# Patient Record
Sex: FEMALE | Race: BLACK OR AFRICAN AMERICAN | NOT HISPANIC OR LATINO | URBAN - METROPOLITAN AREA
[De-identification: names, ages, dates, MRNs, and addresses within clinical notes are randomized per-mention and may not be internally consistent; named-entity substitution may affect disease eponyms.]

---

## 2022-08-04 ENCOUNTER — INPATIENT (INPATIENT)
Facility: HOSPITAL | Age: 52
LOS: 3 days | Discharge: NURSING, REHAB OR LONG-TERM CARE | DRG: 82 | End: 2022-08-08
Attending: STUDENT IN AN ORGANIZED HEALTH CARE EDUCATION/TRAINING PROGRAM | Admitting: STUDENT IN AN ORGANIZED HEALTH CARE EDUCATION/TRAINING PROGRAM
Payer: MEDICAID

## 2022-08-04 VITALS
TEMPERATURE: 99 F | HEIGHT: 64 IN | RESPIRATION RATE: 18 BRPM | OXYGEN SATURATION: 99 % | SYSTOLIC BLOOD PRESSURE: 171 MMHG | DIASTOLIC BLOOD PRESSURE: 108 MMHG | HEART RATE: 107 BPM | WEIGHT: 160.06 LBS

## 2022-08-04 LAB
ALBUMIN SERPL ELPH-MCNC: 2.7 G/DL — LOW (ref 3.3–5)
ALP SERPL-CCNC: 88 U/L — SIGNIFICANT CHANGE UP (ref 40–120)
ALT FLD-CCNC: 19 U/L — SIGNIFICANT CHANGE UP (ref 10–45)
ANION GAP SERPL CALC-SCNC: 12 MMOL/L — SIGNIFICANT CHANGE UP (ref 5–17)
APPEARANCE UR: ABNORMAL
APTT BLD: 29.2 SEC — SIGNIFICANT CHANGE UP (ref 27.5–35.5)
AST SERPL-CCNC: 20 U/L — SIGNIFICANT CHANGE UP (ref 10–40)
BACTERIA # UR AUTO: ABNORMAL
BASOPHILS # BLD AUTO: 0.02 K/UL — SIGNIFICANT CHANGE UP (ref 0–0.2)
BASOPHILS NFR BLD AUTO: 0.2 % — SIGNIFICANT CHANGE UP (ref 0–2)
BILIRUB SERPL-MCNC: 0.2 MG/DL — SIGNIFICANT CHANGE UP (ref 0.2–1.2)
BILIRUB UR-MCNC: NEGATIVE — SIGNIFICANT CHANGE UP
BLD GP AB SCN SERPL QL: NEGATIVE — SIGNIFICANT CHANGE UP
BUN SERPL-MCNC: 5 MG/DL — LOW (ref 7–23)
CALCIUM SERPL-MCNC: 8.3 MG/DL — LOW (ref 8.4–10.5)
CHLORIDE SERPL-SCNC: 104 MMOL/L — SIGNIFICANT CHANGE UP (ref 96–108)
CO2 SERPL-SCNC: 22 MMOL/L — SIGNIFICANT CHANGE UP (ref 22–31)
COLOR SPEC: SIGNIFICANT CHANGE UP
COMMENT - URINE: SIGNIFICANT CHANGE UP
CREAT SERPL-MCNC: 0.34 MG/DL — LOW (ref 0.5–1.3)
DIFF PNL FLD: NEGATIVE — SIGNIFICANT CHANGE UP
EGFR: 123 ML/MIN/1.73M2 — SIGNIFICANT CHANGE UP
EOSINOPHIL # BLD AUTO: 0.03 K/UL — SIGNIFICANT CHANGE UP (ref 0–0.5)
EOSINOPHIL NFR BLD AUTO: 0.3 % — SIGNIFICANT CHANGE UP (ref 0–6)
EPI CELLS # UR: 3 /HPF — SIGNIFICANT CHANGE UP
FLUAV AG NPH QL: SIGNIFICANT CHANGE UP
FLUBV AG NPH QL: SIGNIFICANT CHANGE UP
GLUCOSE SERPL-MCNC: 104 MG/DL — HIGH (ref 70–99)
GLUCOSE UR QL: NEGATIVE — SIGNIFICANT CHANGE UP
HCT VFR BLD CALC: 30 % — LOW (ref 34.5–45)
HGB BLD-MCNC: 9.7 G/DL — LOW (ref 11.5–15.5)
HYALINE CASTS # UR AUTO: 2 /LPF — SIGNIFICANT CHANGE UP (ref 0–7)
IMM GRANULOCYTES NFR BLD AUTO: 0.4 % — SIGNIFICANT CHANGE UP (ref 0–1.5)
INR BLD: 1.27 RATIO — HIGH (ref 0.88–1.16)
KETONES UR-MCNC: NEGATIVE — SIGNIFICANT CHANGE UP
LEUKOCYTE ESTERASE UR-ACNC: ABNORMAL
LYMPHOCYTES # BLD AUTO: 0.82 K/UL — LOW (ref 1–3.3)
LYMPHOCYTES # BLD AUTO: 7.1 % — LOW (ref 13–44)
MCHC RBC-ENTMCNC: 30.7 PG — SIGNIFICANT CHANGE UP (ref 27–34)
MCHC RBC-ENTMCNC: 32.3 GM/DL — SIGNIFICANT CHANGE UP (ref 32–36)
MCV RBC AUTO: 94.9 FL — SIGNIFICANT CHANGE UP (ref 80–100)
MONOCYTES # BLD AUTO: 0.64 K/UL — SIGNIFICANT CHANGE UP (ref 0–0.9)
MONOCYTES NFR BLD AUTO: 5.6 % — SIGNIFICANT CHANGE UP (ref 2–14)
NEUTROPHILS # BLD AUTO: 9.95 K/UL — HIGH (ref 1.8–7.4)
NEUTROPHILS NFR BLD AUTO: 86.4 % — HIGH (ref 43–77)
NITRITE UR-MCNC: NEGATIVE — SIGNIFICANT CHANGE UP
NRBC # BLD: 0 /100 WBCS — SIGNIFICANT CHANGE UP (ref 0–0)
PA ADP PRP-ACNC: 232 PRU — SIGNIFICANT CHANGE UP (ref 194–417)
PH UR: 6 — SIGNIFICANT CHANGE UP (ref 5–8)
PLATELET # BLD AUTO: 681 K/UL — HIGH (ref 150–400)
PLATELET RESPONSE ASPIRIN RESULT: 669 ARU — SIGNIFICANT CHANGE UP (ref 350–700)
POTASSIUM SERPL-MCNC: 3.6 MMOL/L — SIGNIFICANT CHANGE UP (ref 3.5–5.3)
POTASSIUM SERPL-SCNC: 3.6 MMOL/L — SIGNIFICANT CHANGE UP (ref 3.5–5.3)
PROT SERPL-MCNC: 6.1 G/DL — SIGNIFICANT CHANGE UP (ref 6–8.3)
PROT UR-MCNC: ABNORMAL
PROTHROM AB SERPL-ACNC: 14.6 SEC — HIGH (ref 10.5–13.4)
RBC # BLD: 3.16 M/UL — LOW (ref 3.8–5.2)
RBC # FLD: 14.9 % — HIGH (ref 10.3–14.5)
RBC CASTS # UR COMP ASSIST: 3 /HPF — SIGNIFICANT CHANGE UP (ref 0–4)
RH IG SCN BLD-IMP: POSITIVE — SIGNIFICANT CHANGE UP
RSV RNA NPH QL NAA+NON-PROBE: SIGNIFICANT CHANGE UP
SARS-COV-2 RNA SPEC QL NAA+PROBE: SIGNIFICANT CHANGE UP
SODIUM SERPL-SCNC: 138 MMOL/L — SIGNIFICANT CHANGE UP (ref 135–145)
SP GR SPEC: 1.02 — SIGNIFICANT CHANGE UP (ref 1.01–1.02)
UROBILINOGEN FLD QL: NEGATIVE — SIGNIFICANT CHANGE UP
WBC # BLD: 11.51 K/UL — HIGH (ref 3.8–10.5)
WBC # FLD AUTO: 11.51 K/UL — HIGH (ref 3.8–10.5)
WBC UR QL: 6 /HPF — HIGH (ref 0–5)

## 2022-08-04 PROCEDURE — 74177 CT ABD & PELVIS W/CONTRAST: CPT | Mod: 26,MA

## 2022-08-04 PROCEDURE — 70450 CT HEAD/BRAIN W/O DYE: CPT | Mod: 26,MA

## 2022-08-04 PROCEDURE — 99285 EMERGENCY DEPT VISIT HI MDM: CPT

## 2022-08-04 PROCEDURE — 71045 X-RAY EXAM CHEST 1 VIEW: CPT | Mod: 26

## 2022-08-04 PROCEDURE — 99232 SBSQ HOSP IP/OBS MODERATE 35: CPT | Mod: GC

## 2022-08-04 RX ORDER — LEVETIRACETAM 250 MG/1
500 TABLET, FILM COATED ORAL EVERY 12 HOURS
Refills: 0 | Status: DISCONTINUED | OUTPATIENT
Start: 2022-08-04 | End: 2022-08-08

## 2022-08-04 RX ORDER — PIPERACILLIN AND TAZOBACTAM 4; .5 G/20ML; G/20ML
3.38 INJECTION, POWDER, LYOPHILIZED, FOR SOLUTION INTRAVENOUS ONCE
Refills: 0 | Status: COMPLETED | OUTPATIENT
Start: 2022-08-04 | End: 2022-08-04

## 2022-08-04 RX ORDER — SODIUM CHLORIDE 9 MG/ML
500 INJECTION INTRAMUSCULAR; INTRAVENOUS; SUBCUTANEOUS ONCE
Refills: 0 | Status: COMPLETED | OUTPATIENT
Start: 2022-08-04 | End: 2022-08-04

## 2022-08-04 RX ADMIN — LEVETIRACETAM 400 MILLIGRAM(S): 250 TABLET, FILM COATED ORAL at 22:28

## 2022-08-04 RX ADMIN — PIPERACILLIN AND TAZOBACTAM 200 GRAM(S): 4; .5 INJECTION, POWDER, LYOPHILIZED, FOR SOLUTION INTRAVENOUS at 20:53

## 2022-08-04 RX ADMIN — Medication 100 MILLIGRAM(S): at 19:51

## 2022-08-04 RX ADMIN — SODIUM CHLORIDE 500 MILLILITER(S): 9 INJECTION INTRAMUSCULAR; INTRAVENOUS; SUBCUTANEOUS at 19:52

## 2022-08-04 NOTE — ED PROVIDER NOTE - ATTENDING CONTRIBUTION TO CARE
Attending MD Nathalia Kline:  I personally have seen and examined this patient.  Resident note reviewed and agree on plan of care and except where noted.  See HPI, PE, and MDM for details.

## 2022-08-04 NOTE — CONSULT NOTE ADULT - ASSESSMENT
52F pmh Dolores's disease, dementia, HTN, recurrent UTIs with indwelling Lugo who presents after fall.  Transferred from [facility] after CT there showed left head frontal contusion with concern for traumatic SDH.  General Surgery consulted for r/o necrotizing fasciitis in the left buttock area where pt has a stage IV decubitus ulcer. Febrile to 100.8 upon transfer.     Plan:  - stool disimpaction on rectal exam at bedside   - no surgical intervention required   - dispo per ED/NSGY      Seen and Discussed with Attending, Dr. Kulkarni      [facility]  [id number] 52F pmh Dolores's disease, dementia, HTN, recurrent UTIs with indwelling Lugo who presents after fall.  Transferred from [facility] after CT there showed left head frontal contusion with concern for traumatic SDH.  General Surgery consulted for r/o necrotizing fasciitis in the left buttock area where pt has a stage IV decubitus ulcer. No concern for nec fasc on physical exam with no evidence of crepitus. L sided decubitus ulcer open which can be source of air on imaging. LRINEC score of 6.     Plan:  - stool disimpaction on rectal exam at bedside   - no surgical intervention required, no evidence of nec fasc   - dispo per ED/NSGY      Seen and Discussed with Attending, Dr. Kulkarni      [facility]  [id number] 52F pmh Dolores's disease, dementia, HTN, recurrent UTIs with indwelling Lugo who presents after fall.  Transferred from [facility] after CT there showed left head frontal contusion with concern for traumatic SDH. General Surgery consulted for r/o necrotizing fasciitis in the left buttock area where pt has a stage IV decubitus ulcer. No concern for nec fasc on physical exam with no evidence of crepitus. L sided decubitus ulcer open which can be source of air on imaging. LRINEC score of 6.   Plan: - stool disimpaction on rectal exam at bedside  - no surgical intervention required, no evidence of nec fasc  - dispo per ED/NSGY   Seen and Discussed with Attending, Dr. Kulkarni    ACS [id number]

## 2022-08-04 NOTE — ED PROVIDER NOTE - PHYSICAL EXAMINATION
Vital signs reviewed.  CONSTITUTIONAL: Well appearing in NAD  HEAD: Normocephalic; left frontal contusion non-bleeding  NECK: Trachea midline  CV: Normal S1, S2; extremities WWP  RESP: normal work of breathing; no stridor  ABD: soft, non-distended; non-tender  MSK/EXT: no edema, no deformities  SKIN: Warm and dry as visualized.  stage IV sacral decubitus ulcer granulation tissue nonpurulent drainage  NEURO: A&O, appears non-focal  Psych: Appropriate mood and affect Vital signs reviewed.  CONSTITUTIONAL: Well appearing in NAD  HEAD: Normocephalic; left frontal contusion non-bleeding  NECK: Trachea midline  CV: Normal S1, S2; extremities WWP  RESP: normal work of breathing; no stridor  ABD: soft, non-distended; non-tender  MSK/EXT: no edema, no deformities  SKIN: Warm and dry as visualized.  stage IV sacral decubitus ulcer granulation tissue nonpurulent drainage  NEURO: A&O, appears non-focal  Psych: Appropriate mood and affect  Attending Nathalia Kline: Gen: frail appearing, heent: small hematoma to left forehead, perrla, dry mucous membranes, chest; nttp, cv: rrr, lungs; ctab, abd: soft nontender, nondistended. ext: wwp, no deformity, skin: large sacral decubitus, with mild surrounding erythema, neuro: awake, not following commands

## 2022-08-04 NOTE — PROGRESS NOTE ADULT - ATTENDING COMMENTS
PT seen and examined.  Agree with above resident evaluation and assessment.  PT alert, responds with vocalizations, COKER.  CT showed possible subacute/chronic right SDH, small.  Repeat read as no blood or collections. No neurosurgery needed at this time. Care per primary team.

## 2022-08-04 NOTE — ED PROVIDER NOTE - PROGRESS NOTE DETAILS
imformed by radiology resident dr daniel of gas tracking from sacral decub as well as very impressive functional bowel obstruction going to small bowel.  Surgery paged for consult, clindamycin ordered and discussed with RN.

## 2022-08-04 NOTE — ED ADULT NURSE NOTE - NSICDXPASTMEDICALHX_GEN_ALL_CORE_FT
PAST MEDICAL HISTORY:  Dementia     History of Elliott's disease     HLD (hyperlipidemia)     HTN (hypertension)

## 2022-08-04 NOTE — ED ADULT NURSE NOTE - OBJECTIVE STATEMENT
52 year old female presents to ED via ems c/o subdural head bleed secondary to fall out of bed, unwitnessed.  Patient is nonverbal, arrived with PICC line to R underarm with indwelling sanchez catheter in place. Stage 3 sacral healing wound, cleaned and protective allevyn placed to wound.  Indwellilng sanchez catheter changed.  Lung sounds clear. Abd nondistended nontender. Patient is thin.

## 2022-08-04 NOTE — ED ADULT TRIAGE NOTE - CHIEF COMPLAINT QUOTE
transferred from [facility] s/po un witnessed fall at rehab and SDH found on scan. As per EMS pt is at baseline mental status. transferred from [facility] s/p un witnessed fall at rehab and SDH found on scan. As per EMS pt is at baseline mental status.

## 2022-08-04 NOTE — ED ADULT NURSE NOTE - CHIEF COMPLAINT QUOTE
transferred from [facility] s/p un witnessed fall at rehab and SDH found on scan. As per EMS pt is at baseline mental status.

## 2022-08-04 NOTE — ED PROVIDER NOTE - CLINICAL SUMMARY MEDICAL DECISION MAKING FREE TEXT BOX
52F arslan, dementia, htn, hld who presents after fall with SDH. Due for 4h repeat head CT at 740pm.  Neurosx aware. 52F arslan, dementia, htn, hld who presents after fall with SDH. Due for 4h repeat head CT at 740pm.  Neurosx aware.  Attending Nathalia Kline: 53 yo female with h/o huntingitons dementia on abx for unclear infection transferred from [facility] for concern for subdural hematoma. on review of ED course at [facility] pt febrile there, given zosyn and vanco, ct with evidence of subdural and transferred. upon arrival initially hypertensive on re-cjecl wml;. cultures. sent. on exam pt with sacral decubutis ulcer, fecal matter in diaper. suspect sacral decub could be source of infection. sanchez catheter changed. neurosurgery consulted. pt not reportedly on blood thinners. will obtain ct of wound to further evaluate, pan culture, neurosurgery eval. per report pt at baseline mental status

## 2022-08-04 NOTE — CONSULT NOTE ADULT - SUBJECTIVE AND OBJECTIVE BOX
GENERAL SURGERY CONSULT NOTE  Attending: Dr. Aakash Kulkarni  pager [phone]  --------------------------------------------------------------------------------------------    HPI:   52F pmh Kearney's disease, dementia, HTN, recurrent UTIs with indwelling Lugo who presents after fall.  Transferred from [facility] after CT there showed left head frontal contusion with concern for traumatic SDH.  General Surgery consulted for r/o necrotizing fasciitis in the left buttock area where pt has a stage IV decubitus ulcer. Febrile to 100.8 upon transfer.     In the ED, patient is afebrile and hemodynamically stable. Per report, patient lives in a nursing home, nonverbal at baseline, and her mechanism of fall is unknown. No family at bedside and no contact info per ED.     PAST MEDICAL & SURGICAL HISTORY:  History of Kearney&#x27;s disease      Dementia      HTN (hypertension)      HLD (hyperlipidemia)        FAMILY HISTORY:  [] Family history not pertinent as reviewed with the patient and family    SOCIAL HISTORY:    n/a    ALLERGIES: No Known Allergies      HOME MEDICATIONS:   n/a    CURRENT MEDICATIONS  MEDICATIONS (STANDING): levETIRAcetam  IVPB 500 milliGRAM(s) IV Intermittent every 12 hours    MEDICATIONS (PRN):  --------------------------------------------------------------------------------------------    Vitals:   T(C): 36.9 (22 @ 20:45), Max: 37.1 (22 @ 17:25)  HR: 87 (22 @ 20:45) (87 - 107)  BP: 151/66 (22 @ 20:45) (128/78 - 171/108)  RR: 18 (22 @ 20:45) (18 - 18)  SpO2: 100% (22 @ 20:45) (97% - 100%)  CAPILLARY BLOOD GLUCOSE          Height (cm): 162.6 ( @ 17:25)  Weight (kg): 72.6 ( @ 17:25)  BMI (kg/m2): 27.5 ( 17:25)  BSA (m2): 1.78 (:25)    PHYSICAL EXAM:   General: NAD, Lying in bed comfortably, nonverbal   Neuro: A+Ox0  HEENT: NC/AT, EOMI  Resp: Good effort, CTA b/l  Thorax: No chest wall tenderness  GI/Abd: Soft, NT/ND, no rebound/guarding, no masses palpated  Rectum: L sacral decubitus ulcer stage IV w/ tracking laterally. surrounding tissue looks healthy   Vascular: All 4 extremities warm.  : Chronic Lugo     --------------------------------------------------------------------------------------------    LABS  CBC ( @ 18:35)                              9.7<L>                         11.51<H>  )----------------(  681<H>     86.4<H>% Neutrophils, 7.1<L>% Lymphocytes, ANC: 9.95<H>                              30.0<L>    BMP ( @ 19:33)             138     |  104     |  5<L>  		Ca++ --      Ca 8.3<L>             ---------------------------------( 104<H>		Mg --                 3.6     |  22      |  0.34<L>			Ph --        LFTs ( @ 19:33)      TPro 6.1 / Alb 2.7<L> / TBili 0.2 / DBili -- / AST 20 / ALT 19 / AlkPhos 88    Coags ( @ 18:35)  aPTT 29.2 / INR 1.27<H> / PT 14.6<H>        VBG ( @ 17:57)     7.40 / 40 / 108<H> / 25 / 0.0 / 99.5<H>%     Lactate: 0.7    --------------------------------------------------------------------------------------------    MICROBIOLOGY  Urinalysis ( @ 18:35):     Color: Light Yellow / Appearance: Slightly Turbid<!> / S.016 / pH: 6.0 / Gluc: Negative / Ketones: Negative / Bili: Negative / Urobili: Negative / Protein :Trace<!> / Nitrites: Negative / Leuk.Est: Moderate<!> / RBC: 3 / WBC: 6<H> / Sq Epi:  / Non Sq Epi: 3 / Bacteria Few<!>   MANY AMORPHOUS CRYSTALS PRESENT      --------------------------------------------------------------------------------------------    IMAGING  < from: CT Abdomen and Pelvis w/ IV Cont (22 @ 19:14) >  FINDINGS:  LOWER CHEST: Within normal limits.    LIVER: Within normal limits.  BILE DUCTS: Normal caliber.  GALLBLADDER: Nondilated gallbladder. No pericholecystic fluid nor   appreciable gallbladder wall thickening.  SPLEEN: Within normal limits.  PANCREAS: No peripancreatic fat stranding nor pancreatic edema.  ADRENALS: Within normal limits.  KIDNEYS/URETERS: Symmetric enhancement bilaterally. No hydronephrosis.    BLADDER: Indwelling Lugo. Visible air within the bladder; likely   secondary to recent instrumentation. No arnaud appreciable bladder   abnormality.  REPRODUCTIVE ORGANS: Globular fibroid uterus with varying degrees of   calcification.    BOWEL: Profound stool burden with severe rectal distention and rectal   wall thickening and adjacent fat stranding. Extensive fecalization of the   small bowel with dilated large and small bowel loops containing formed   feces.  PERITONEUM: No ascites.  VESSELS: Atherosclerotic changes.  RETROPERITONEUM/LYMPH NODES: No lymphadenopathy.  ABDOMINAL WALL: Visible defect of the sacrum consistent with decubitus   ulcer (series 2-88). Tracking gas along the posterior sacral fascia and   within the left greater than right gluteal musculature; gas forming   bacterial infection cannot be excluded.  BONES: Minimal degenerative changes. No appreciable erosive changes of   the sacrum to suggest sacral osteomyelitis. Suggest further evaluation   with MRI of the sacrum    IMPRESSION:  Sacral decubitus ulcer with locules of gas tracking along the superficial   sacral fascia and into the left greater than right gluteal musculature;   gas forming bacterial infection cannot be excluded.    Suggest further evaluation for suspected sacral osteomyelitis with MRI    Profound stool burden with severe rectal distention and rectal wall   thickening/adjacent fat stranding. Extensive sacralization small bowel   with multiple dilated loops of large and small bowel containing formed   feces. Findings are consistent with partial/functional bowel obstruction   originating from the impacted rectum.    Suspicion for stercoral colitis.    Findings and recommendations regarding subcutaneous gas collection and   functional bowel obstruction. discussed by Dr. Rosa ofradiology with   Dr. Villarreal of primary emergency department team at 7:29 PM on 2022   with read back confirmation.    --- End of Report ---           VALENTINO ROSA MD; Resident Radiologist  This document has been electronically signed.  JENNIFER PEREA MD; Attending Radiologist  This document has been electronically signed. Aug  4 2022  8:23PM    < end of copied text >      --------------------------------------------------------------------------------------------

## 2022-08-04 NOTE — ED PROVIDER NOTE - NSICDXPASTMEDICALHX_GEN_ALL_CORE_FT
PAST MEDICAL HISTORY:  Dementia     History of [place]'s disease     HLD (hyperlipidemia)     HTN (hypertension)

## 2022-08-04 NOTE — ED PROVIDER NOTE - OBJECTIVE STATEMENT
52F pmh Dolores's disease, dementia, HTN, recurrent UTIs with indwelling Sanchez who presents after fall.  Transferred from [facility] after CT there showed left head frontal contusion with concern for traumatic SDH.  Patient also has stage IV sacral decubitus ulcer which was not examined at [facility].  Patient febrile there to 100.8, given zosyn + vancomycin for unclear source of fever.  No cultures obtained there, sanchez not exchanged.    CT head at [facility] obtained at 15:49, 4 hour repeat due at 7:49pm. 52F pmh Dolores's disease, dementia, HTN, recurrent UTIs with indwelling Sanchez who presents after fall.  Transferred from [facility] after CT there showed left head frontal contusion with concern for traumatic SDH.  Patient also has stage IV sacral decubitus ulcer which was not examined at [facility].  Patient febrile there to 100.8, given zosyn + vancomycin for unclear source of fever.  No cultures obtained there, sanchez not exchanged.    CT head at [facility] obtained at 15:49, 4 hour repeat due at 7:49pm.  Abx given at 11:30am.

## 2022-08-04 NOTE — PROGRESS NOTE ADULT - SUBJECTIVE AND OBJECTIVE BOX
62F hx Huntingtons's, dementia, HTN, recurrent UTIs with indwelling sanchez, no AC/AP transfer from [facility] s/p unwitnessed fall. CTH with small R subacute SDH. She has a UTI with low grade sepsis and grade IV sacral ulcer (febrile 100.8).     --Anticoagulation--    T(C): 37.1 (08-04-22 @ 17:25), Max: 37.1 (08-04-22 @ 17:25)  HR: 107 (08-04-22 @ 17:25) (107 - 107)  BP: 171/108 (08-04-22 @ 17:25) (171/108 - 171/108)  RR: 18 (08-04-22 @ 17:25) (18 - 18)  SpO2: 99% (08-04-22 @ 17:25) (99% - 99%)  Wt(kg): --    EXAM:  nonverbal, no EO, PERRL, unable to assess EOM, not FC, contracted RU at wrist, contracted JOE at elbow, contracted L foot, minimal to no movement x4. Baseline exam 2/2 Edmonson’s

## 2022-08-04 NOTE — CONSULT NOTE ADULT - ATTENDING COMMENTS
seen and examined 08-05-22 @ 4315    she fell out of bed in her nursing home and was transferred from [facility] for a small right frontal SDH  we were specifically consulted for her sacral decubitus ulcer, not her trauma.        I reviewed labs and radiologic images. seen and examined 08-04-22 @ 9287    she fell out of bed in her nursing home and was transferred from [facility] for a small right frontal SDH  we were specifically consulted for her sacral decubitus ulcer, not her trauma.        I reviewed labs and radiologic images. seen and examined 08-04-22 @ 2320    she fell out of bed in her nursing home and was transferred from [facility] for a small right frontal SDH  we were specifically consulted for her sacral decubitus ulcer, not her trauma.    afeb  mildly tachycardic and hypertensive on arrival to [facility], but now AVSS  soft / NT / ND  FLAVIA - no masses or blood. hard stool manually disimpacted.    clean sacral decubitus ulcer without odor (see photo above)  irregular contour to sacrum consistent with chronic osteomyelitis  there is a narrow tract that undermines to the left gluteus (consistent with the location of air on CT scan), but no purulent or dishwater drainage on palpating the tract    WBC = 11  ESR = 63  CRP = 19  lactate = 0.7      stage 4 sacral decubitus ulcer  no evidence of acute infection  -wound care    fecal impaction  -aggressive bowel regimen    -reconsult acute care surgery PRN      I reviewed labs and radiologic images.

## 2022-08-05 DIAGNOSIS — N39.0 URINARY TRACT INFECTION, SITE NOT SPECIFIED: ICD-10-CM

## 2022-08-05 DIAGNOSIS — Z86.69 PERSONAL HISTORY OF OTHER DISEASES OF THE NERVOUS SYSTEM AND SENSE ORGANS: ICD-10-CM

## 2022-08-05 DIAGNOSIS — K56.41 FECAL IMPACTION: ICD-10-CM

## 2022-08-05 DIAGNOSIS — S06.5X9A TRAUMATIC SUBDURAL HEMORRHAGE WITH LOSS OF CONSCIOUSNESS OF UNSPECIFIED DURATION, INITIAL ENCOUNTER: ICD-10-CM

## 2022-08-05 DIAGNOSIS — L98.429 NON-PRESSURE CHRONIC ULCER OF BACK WITH UNSPECIFIED SEVERITY: ICD-10-CM

## 2022-08-05 DIAGNOSIS — I10 ESSENTIAL (PRIMARY) HYPERTENSION: ICD-10-CM

## 2022-08-05 LAB
ANION GAP SERPL CALC-SCNC: 9 MMOL/L — SIGNIFICANT CHANGE UP (ref 5–17)
BASOPHILS # BLD AUTO: 0.04 K/UL — SIGNIFICANT CHANGE UP (ref 0–0.2)
BASOPHILS NFR BLD AUTO: 0.6 % — SIGNIFICANT CHANGE UP (ref 0–2)
BUN SERPL-MCNC: <4 MG/DL — LOW (ref 7–23)
CALCIUM SERPL-MCNC: 9.2 MG/DL — SIGNIFICANT CHANGE UP (ref 8.4–10.5)
CHLORIDE SERPL-SCNC: 106 MMOL/L — SIGNIFICANT CHANGE UP (ref 96–108)
CO2 SERPL-SCNC: 27 MMOL/L — SIGNIFICANT CHANGE UP (ref 22–31)
CREAT SERPL-MCNC: 0.48 MG/DL — LOW (ref 0.5–1.3)
CULTURE RESULTS: NO GROWTH — SIGNIFICANT CHANGE UP
EGFR: 113 ML/MIN/1.73M2 — SIGNIFICANT CHANGE UP
EOSINOPHIL # BLD AUTO: 0.06 K/UL — SIGNIFICANT CHANGE UP (ref 0–0.5)
EOSINOPHIL NFR BLD AUTO: 0.9 % — SIGNIFICANT CHANGE UP (ref 0–6)
ERYTHROCYTE [SEDIMENTATION RATE] IN BLOOD: 63 MM/HR — HIGH (ref 0–20)
GLUCOSE SERPL-MCNC: 85 MG/DL — SIGNIFICANT CHANGE UP (ref 70–99)
HCT VFR BLD CALC: 33.2 % — LOW (ref 34.5–45)
HGB BLD-MCNC: 10.6 G/DL — LOW (ref 11.5–15.5)
IMM GRANULOCYTES NFR BLD AUTO: 0.3 % — SIGNIFICANT CHANGE UP (ref 0–1.5)
LYMPHOCYTES # BLD AUTO: 1.05 K/UL — SIGNIFICANT CHANGE UP (ref 1–3.3)
LYMPHOCYTES # BLD AUTO: 15.6 % — SIGNIFICANT CHANGE UP (ref 13–44)
MCHC RBC-ENTMCNC: 31 PG — SIGNIFICANT CHANGE UP (ref 27–34)
MCHC RBC-ENTMCNC: 31.9 GM/DL — LOW (ref 32–36)
MCV RBC AUTO: 97.1 FL — SIGNIFICANT CHANGE UP (ref 80–100)
MONOCYTES # BLD AUTO: 0.51 K/UL — SIGNIFICANT CHANGE UP (ref 0–0.9)
MONOCYTES NFR BLD AUTO: 7.6 % — SIGNIFICANT CHANGE UP (ref 2–14)
NEUTROPHILS # BLD AUTO: 5.04 K/UL — SIGNIFICANT CHANGE UP (ref 1.8–7.4)
NEUTROPHILS NFR BLD AUTO: 75 % — SIGNIFICANT CHANGE UP (ref 43–77)
NRBC # BLD: 0 /100 WBCS — SIGNIFICANT CHANGE UP (ref 0–0)
PLATELET # BLD AUTO: 698 K/UL — HIGH (ref 150–400)
POTASSIUM SERPL-MCNC: 4.4 MMOL/L — SIGNIFICANT CHANGE UP (ref 3.5–5.3)
POTASSIUM SERPL-SCNC: 4.4 MMOL/L — SIGNIFICANT CHANGE UP (ref 3.5–5.3)
RBC # BLD: 3.42 M/UL — LOW (ref 3.8–5.2)
RBC # FLD: 15.4 % — HIGH (ref 10.3–14.5)
SODIUM SERPL-SCNC: 142 MMOL/L — SIGNIFICANT CHANGE UP (ref 135–145)
SPECIMEN SOURCE: SIGNIFICANT CHANGE UP
VANCOMYCIN TROUGH SERPL-MCNC: 10.7 UG/ML — SIGNIFICANT CHANGE UP (ref 10–20)
WBC # BLD: 6.72 K/UL — SIGNIFICANT CHANGE UP (ref 3.8–10.5)
WBC # FLD AUTO: 6.72 K/UL — SIGNIFICANT CHANGE UP (ref 3.8–10.5)

## 2022-08-05 PROCEDURE — 99223 1ST HOSP IP/OBS HIGH 75: CPT

## 2022-08-05 PROCEDURE — 99255 IP/OBS CONSLTJ NEW/EST HI 80: CPT

## 2022-08-05 RX ORDER — HEPARIN SODIUM 5000 [USP'U]/ML
5000 INJECTION INTRAVENOUS; SUBCUTANEOUS EVERY 12 HOURS
Refills: 0 | Status: DISCONTINUED | OUTPATIENT
Start: 2022-08-05 | End: 2022-08-05

## 2022-08-05 RX ORDER — AMLODIPINE BESYLATE 2.5 MG/1
1 TABLET ORAL
Qty: 0 | Refills: 0 | DISCHARGE

## 2022-08-05 RX ORDER — LACTULOSE 10 G/15ML
20 SOLUTION ORAL
Refills: 0 | Status: DISCONTINUED | OUTPATIENT
Start: 2022-08-05 | End: 2022-08-08

## 2022-08-05 RX ORDER — VANCOMYCIN HCL 1 G
1000 VIAL (EA) INTRAVENOUS EVERY 12 HOURS
Refills: 0 | Status: DISCONTINUED | OUTPATIENT
Start: 2022-08-05 | End: 2022-08-05

## 2022-08-05 RX ORDER — ONDANSETRON 8 MG/1
4 TABLET, FILM COATED ORAL EVERY 8 HOURS
Refills: 0 | Status: DISCONTINUED | OUTPATIENT
Start: 2022-08-05 | End: 2022-08-08

## 2022-08-05 RX ORDER — TRAZODONE HCL 50 MG
150 TABLET ORAL DAILY
Refills: 0 | Status: DISCONTINUED | OUTPATIENT
Start: 2022-08-05 | End: 2022-08-08

## 2022-08-05 RX ORDER — SENNA PLUS 8.6 MG/1
2 TABLET ORAL
Refills: 0 | Status: DISCONTINUED | OUTPATIENT
Start: 2022-08-05 | End: 2022-08-08

## 2022-08-05 RX ORDER — TRAZODONE HCL 50 MG
1 TABLET ORAL
Qty: 0 | Refills: 0 | DISCHARGE

## 2022-08-05 RX ORDER — ACETAMINOPHEN 500 MG
650 TABLET ORAL EVERY 6 HOURS
Refills: 0 | Status: DISCONTINUED | OUTPATIENT
Start: 2022-08-05 | End: 2022-08-06

## 2022-08-05 RX ORDER — AMLODIPINE BESYLATE 2.5 MG/1
5 TABLET ORAL DAILY
Refills: 0 | Status: DISCONTINUED | OUTPATIENT
Start: 2022-08-05 | End: 2022-08-08

## 2022-08-05 RX ORDER — POLYETHYLENE GLYCOL 3350 17 G/17G
17 POWDER, FOR SOLUTION ORAL
Refills: 0 | Status: DISCONTINUED | OUTPATIENT
Start: 2022-08-05 | End: 2022-08-08

## 2022-08-05 RX ORDER — ASCORBIC ACID 60 MG
1 TABLET,CHEWABLE ORAL
Qty: 0 | Refills: 0 | DISCHARGE

## 2022-08-05 RX ORDER — LANOLIN ALCOHOL/MO/W.PET/CERES
1 CREAM (GRAM) TOPICAL
Qty: 0 | Refills: 0 | DISCHARGE

## 2022-08-05 RX ORDER — LANOLIN ALCOHOL/MO/W.PET/CERES
3 CREAM (GRAM) TOPICAL AT BEDTIME
Refills: 0 | Status: DISCONTINUED | OUTPATIENT
Start: 2022-08-05 | End: 2022-08-06

## 2022-08-05 RX ORDER — PIPERACILLIN AND TAZOBACTAM 4; .5 G/20ML; G/20ML
3.38 INJECTION, POWDER, LYOPHILIZED, FOR SOLUTION INTRAVENOUS EVERY 8 HOURS
Refills: 0 | Status: DISCONTINUED | OUTPATIENT
Start: 2022-08-05 | End: 2022-08-07

## 2022-08-05 RX ADMIN — PIPERACILLIN AND TAZOBACTAM 25 GRAM(S): 4; .5 INJECTION, POWDER, LYOPHILIZED, FOR SOLUTION INTRAVENOUS at 04:09

## 2022-08-05 RX ADMIN — LEVETIRACETAM 400 MILLIGRAM(S): 250 TABLET, FILM COATED ORAL at 22:34

## 2022-08-05 RX ADMIN — Medication 100 MILLIGRAM(S): at 08:33

## 2022-08-05 RX ADMIN — Medication 150 MILLIGRAM(S): at 18:10

## 2022-08-05 RX ADMIN — PIPERACILLIN AND TAZOBACTAM 25 GRAM(S): 4; .5 INJECTION, POWDER, LYOPHILIZED, FOR SOLUTION INTRAVENOUS at 11:57

## 2022-08-05 RX ADMIN — PIPERACILLIN AND TAZOBACTAM 25 GRAM(S): 4; .5 INJECTION, POWDER, LYOPHILIZED, FOR SOLUTION INTRAVENOUS at 20:43

## 2022-08-05 RX ADMIN — LEVETIRACETAM 400 MILLIGRAM(S): 250 TABLET, FILM COATED ORAL at 09:32

## 2022-08-05 RX ADMIN — Medication 250 MILLIGRAM(S): at 06:15

## 2022-08-05 RX ADMIN — Medication 250 MILLIGRAM(S): at 17:40

## 2022-08-05 NOTE — CONSULT NOTE ADULT - ATTENDING COMMENTS
Patient presented to [facility] for fall and fever  At [facility] concern for traumatic SDH so transferred to [facility]  Febrile on presentation to [facility]  Was being treated for UTI at NH  Patient with stage IV sacral wound but no evidence of superinfection  CT A/P imaging with gas tracking but likely related to skin defects at stage IV wound  No indication for Clindamycin or Vancomycin  Can continue Zosyn for now pending blood and urine cultures but if negative would stop    I will be away over this upcoming weekend. Please contact the Infectious Diseases Office with any further questions or concerns.     Federico Medeiros M.D.  [facility] Division of Infectious Disease  8AM-5PM Monday - Friday: Available on [employer] Teams  After Hours and Holidays (or if no response on [employer] Teams): Please contact the Infectious Diseases Office at [phone]     The above assessment and plan were discussed with medicine NP

## 2022-08-05 NOTE — CONSULT NOTE ADULT - ASSESSMENT
A/P:62 yr old female with a pmh of Huntingtons's, dementia, HTN, recurrent UTIs with indwelling sanchez, no AC/AP who presents as a transfer from [facility] after CT there showed a left frontal contusion with concern for traumatic SDH following a fall    Wound Consult requested to assist w/ management of Stage 4 sacral ulcer    Sacrum: Dakins once daily and prn soiling in the interim until a wound VAC can be placed       Continue w/ attends under pads and Pericare  as per protocol  Moisturize intact skin w/ SWEEN cream BID  Abx per med/ID  Nutrition Consult for optimization in pt w/ Severe Protein Calorie Malnutrition,        Inadequate PO intake, & Increased nutritional needs            encourage high quality protein, MVI & Vit C to promote wound healing  Continue turning and positioning w/ offloading assistive devices as per protocol  Continue w/ low air loss pressure redistribution bed surface   Care as per medicine, will follow w/ you  Upon discharge f/u as [facility] at [facility] [address] [place] [address] [phone]   D/w team & RN  Thank you for this consult  I spent 50   minutes face to face w/ this pt of which more than 50% of the time was spent counseling & coordinating care of this pt.    ALCIDES Falcon  [facility] [id number]

## 2022-08-05 NOTE — H&P ADULT - NSICDXPASTMEDICALHX_GEN_ALL_CORE_FT
PAST MEDICAL HISTORY:  Dementia     History of Wise's disease     HLD (hyperlipidemia)     HTN (hypertension)

## 2022-08-05 NOTE — ED ADULT NURSE REASSESSMENT NOTE - NS ED NURSE REASSESS COMMENT FT1
VSS. Pt remains baseline mental status, AOx0, nonverbal. At this time, no non-verbal signs of pain present. Pending dispo. Spontaneous/unlabored respirations noted. Side rails up, bed in lowest position, safety maintained. Will continue to monitor.

## 2022-08-05 NOTE — PATIENT PROFILE ADULT - AGENT'S NAME
----- Message from Carleen Matamoros sent at 9/15/2020  9:52 AM CDT -----  Regarding: return call  Contact: monie  Type:  Patient Returning Call  Who Called:  patient  Who Left Message for Patient:  Lisa  Does the patient know what this is regarding?:  yes  Best Call Back Number:  [phone]  Additional Information:  sent message to teams. Thanks!       Jackelyn Langley

## 2022-08-05 NOTE — H&P ADULT - HISTORY OF PRESENT ILLNESS
62 yr old female with a pmh of Huntingtons's, dementia, HTN, recurrent UTIs with indwelling sanchez, no AC/AP who presents as a transfer from [facility] after CT there showed a left frontal contusion with concern for traumatic SDH following a fall. Hx obtained from chart review as pt unable to provide information.  Of note the ED chart could not be found by myself/nursing staff therefore pharmacy emailed for medrec.   Pt was also found to have a  sacral decubitus ulcer.  At [facility] was given vanc + zosyn ( 11am) for Tmax 100.8  ROS unable to obtain due to pt mentation  vitals: T 98.7, HR 85, /94, RR 17 satting 100% RA

## 2022-08-05 NOTE — PATIENT PROFILE ADULT - FUNCTIONAL ASSESSMENT - BASIC MOBILITY SECTION LABEL
Pt is past due for f/u pap smear.  [facility] clinic and schedule.  Maru Plummer,    Pap Tracking       .

## 2022-08-05 NOTE — H&P ADULT - PROBLEM SELECTOR PLAN 2
Active treatment  Reports per chart review pt actively being treated for UTI  Lugo changed on admission  Abc as above   collateral from rehab in AM

## 2022-08-05 NOTE — PATIENT PROFILE ADULT - FALL HARM RISK - HARM RISK INTERVENTIONS

## 2022-08-05 NOTE — CONSULT NOTE ADULT - ASSESSMENT
62 yr old female with a pmh of Huntingtons's, dementia, HTN, recurrent UTIs with indwelling sanchez, no AC/AP who presents as a transfer from [facility] after CT there showed a left frontal contusion with concern for traumatic SDH following a fall. ID consulted for fever in the setting of extensive sacral wound 62 yr old female with a pmh of Huntingtons's, dementia, HTN, recurrent UTIs with indwelling sanchez, no AC/AP who presents as a transfer from [facility] after CT there showed a left frontal contusion with concern for traumatic SDH following a fall. ID consulted for fever in the setting of extensive sacral wound.      Plan  Sacral wound looks fairly clean, suggest d/c clindamycin and vanco  If blood cultures result as negative favor also discontinuing Zosyn at that time.  With regards to Osteo, recommend decision making regarding bowel diversion and flap in order to effectively treat osteo as prolonged course of antibiotic is not curative.  Agree with vac placement  ID to follow  D/w Primary team  D/w Dr Medeiros 62 yr old female with a pmh of Huntingtons's, dementia, HTN, recurrent UTIs with indwelling sanchez, no AC/AP who presents as a transfer from [facility] after CT there showed a left frontal contusion with concern for traumatic SDH following a fall. ID consulted for fever in the setting of extensive sacral wound.    #Fever, Leukocytosis, Sacral Wound  Sacral wound looks fairly clean, doubt superinfection of wound  On presentation to [facility] was febrile  Was being treated for UTI at NH prior to presentation  --Would discontinue Clindamycin and Vancomycin  --Would not pursue MRI of sacrum as I would not recommend long term antibiotics in either case as patient is not expected to have improved ability to offload. Without aggressive management such flap placement over the sacral wound would not achieve cure even with extended therapy.     --If blood cultures / urine cultures are negative would discontinue Zosyn    ID to follow  D/w Primary team  D/w Dr Medeiros

## 2022-08-05 NOTE — CONSULT NOTE ADULT - SUBJECTIVE AND OBJECTIVE BOX
Wound SURGERY CONSULT NOTE    HPI:  62 yr old female with a pmh of [place]'s, dementia, HTN, recurrent UTIs with indwelling sanchez, no AC/AP who presents as a transfer from [facility] after CT there showed a left frontal contusion with concern for traumatic SDH following a fall. Hx obtained from chart review as pt unable to provide information.  Of note the ED chart could not be found by myself/nursing staff therefore pharmacy emailed for medrec.   Pt was also found to have a  sacral decubitus ulcer.  At [facility] was given vanc + zosyn ( 11am) for Tmax 100.8  ROS unable to obtain due to pt mentation  vitals: T 98.7, HR 85, /94, RR 17 satting 100% RA (05 Aug 2022 03:21)      Wound consult requested by team to assist w/ management of Stage 4 sacral wound.   Pt unable  c/o pain, drainage, odor, color change,  worsening swelling. Offloading and pericare initiated Increasingly sedentary 2/2 to illness. Pt is Incontinent of urine     Current Diet: Diet, Regular:   DASH/TLC Sodium & Cholesterol Restricted (DASH) (08-05-22 @ 03:20)      PAST MEDICAL & SURGICAL HISTORY:  History of Dolores&#x27;s disease  Dementia  HTN (hypertension)  HLD (hyperlipidemia)  No significant past surgical history      REVIEW OF SYSTEMS: Pt unable to offer  General/ Breast/ Skin/ Neuro/ MSK: see HPI      MEDICATIONS  (STANDING):  amLODIPine   Tablet 5 milliGRAM(s) Oral daily  clindamycin IVPB 600 milliGRAM(s) IV Intermittent every 8 hours  levETIRAcetam  IVPB 500 milliGRAM(s) IV Intermittent every 12 hours  piperacillin/tazobactam IVPB.. 3.375 Gram(s) IV Intermittent every 8 hours  polyethylene glycol 3350 17 Gram(s) Oral two times a day  senna 2 Tablet(s) Oral two times a day  traZODone 150 milliGRAM(s) Oral daily  vancomycin  IVPB 1000 milliGRAM(s) IV Intermittent every 12 hours    MEDICATIONS  (PRN):  acetaminophen     Tablet .. 650 milliGRAM(s) Oral every 6 hours PRN Temp greater or equal to 38C (100.4F), Mild Pain (1 - 3)  aluminum hydroxide/magnesium hydroxide/simethicone Suspension 30 milliLiter(s) Oral every 4 hours PRN Dyspepsia  lactulose Syrup 20 Gram(s) Oral two times a day PRN constipation  melatonin 3 milliGRAM(s) Oral at bedtime PRN Insomnia  ondansetron Injectable 4 milliGRAM(s) IV Push every 8 hours PRN Nausea and/or Vomiting      Allergies: No Known Allergies    Intolerances        SOCIAL HISTORY: Per report, patient lives in a nursing home, nonverbal at baseline,  FAMILY HISTORY:  No pertinent family history in first degree relatives     no h/o PVD or wound healing or skin/ significant problems    PHYSICAL EXAM:  Vital Signs Last 24 Hrs  T(C): 36.3 (05 Aug 2022 11:13), Max: 37.1 (04 Aug 2022 17:25)  T(F): 97.3 (05 Aug 2022 11:13), Max: 98.7 (04 Aug 2022 17:25)  HR: 69 (05 Aug 2022 11:13) (61 - 107)  BP: 122/81 (05 Aug 2022 11:13) (110/72 - 171/108)  BP(mean): 93 (05 Aug 2022 04:12) (93 - 100)  RR: 18 (05 Aug 2022 11:13) (16 - 18)  SpO2: 99% (05 Aug 2022 11:13) (97% - 100%)    Parameters below as of 05 Aug 2022 11:13  Patient On (Oxygen Delivery Method): room air        NAD, alert, awake.   cachectic/ thin    HEENT:  NC/AT, PERRL, EOMI, sclera clear, mucosa moist, throat clear,   Respiratory: nonlabored w/ equal chest rise  Gastrointestinal soft NT/ND (+)BS   : (+)sanchez  Neurology:  weakened strength & sensation   Psych: calm/ appropriate  Musculoskeletal:  limited stiff, no deformities/ contractures  Vascular: BLE equally warm. no cyanosis, clubbing, edema  Skin:   L sacral decubitus ulcer stage IV 10cm*5.5cm*1cm.  Some sanguinous drainage noted. No exposed bone, no drainage of pus, no odor.       LABS/ CULTURES/ RADIOLOGY:                        10.6   6.72  )-----------( 698      ( 05 Aug 2022 08:52 )             33.2       142  |  106  |  <4  ----------------------------<  85      [08-05-22 @ 08:52]  4.4   |  27  |  0.48        Ca     9.2     [08-05-22 @ 08:52]    TPro  6.1  /  Alb  2.7  /  TBili  0.2  /  DBili  x   /  AST  20  /  ALT  19  /  AlkPhos  88  [08-04-22 @ 19:33]    PT/INR: PT 14.6 , INR 1.27       [08-04-22 @ 18:35]  PTT: 29.2       [08-04-22 @ 18:35]      Hemoglobin A1C

## 2022-08-05 NOTE — H&P ADULT - NSHPLABSRESULTS_GEN_ALL_CORE
labs reviewed                        9.7    11.51 )-----------( 681      ( 04 Aug 2022 18:35 )             30.0       08-04    138  |  104  |  5<L>  ----------------------------<  104<H>  3.6   |  22  |  0.34<L>    Ca    8.3<L>      04 Aug 2022 19:33    TPro  6.1  /  Alb  2.7<L>  /  TBili  0.2  /  DBili  x   /  AST  20  /  ALT  19  /  AlkPhos  88  08-04        PT/INR - ( 04 Aug 2022 18:35 )   PT: 14.6 sec;   INR: 1.27 ratio         PTT - ( 04 Aug 2022 18:35 )  PTT:29.2 sec    17:57 - VBG - pH: 7.40  | pCO2: 40    | pO2: 108   | Lactate: 0.7        Urinalysis Basic - ( 04 Aug 2022 18:35 )    Color: Light Yellow / Appearance: Slightly Turbid / S.016 / pH: x  Gluc: x / Ketone: Negative  / Bili: Negative / Urobili: Negative   Blood: x / Protein: Trace / Nitrite: Negative   Leuk Esterase: Moderate / RBC: 3 /hpf / WBC 6 /HPF   Sq Epi: x / Non Sq Epi: 3 /hpf / Bacteria: Few    CXR interpreted by myself: clear lungs  images interpreted by radiology:   CT head: Motion limited exam. If clinically indicated, repeat imaging may be obtained.    No grossly obvious acute intracranial hemorrhage or acute territorial infarction. Diffuse volume loss.    CT abdo/pelvis: Sacral decubitus ulcer with locules of gas tracking along the superficial sacral fascia and into the left greater than right gluteal musculature; gas forming bacterial infection cannot be excluded.    Suggest further evaluation for suspected sacral osteomyelitis with MRI    Profound stool burden with severe rectal distention and rectal wall thickening/adjacent fat stranding. Extensive sacralization small bowel with multiple dilated loops of large and small bowel containing formed feces. Findings are consistent with partial/functional bowel obstruction originating from the impacted rectum.    Suspicion for stercoral colitis.

## 2022-08-05 NOTE — H&P ADULT - PROBLEM SELECTOR PLAN 3
New  Small right SDH  Neurosurgery consulted: Hold AC/AP, check platelets and coags  - Patient likely being admitted to medicine  - Repeat CTH now (4hrs from initial) - repeat complete  - If repeat CT stable, no neurosurgical needs. F/u with Dr. River in 1-2 weeks as outpatient   - Keppra 500 BID

## 2022-08-05 NOTE — H&P ADULT - PROBLEM/PLAN-6
11/14/21                            Chris Wilkins  [address] [place] [address]    To Whom It May Concern:    This is to certify Chris Wilkins was evaluated with Abran Tinajero DO on 11/14/21 and can return to regular work on 11/15/21, if Strep is negative.     RESTRICTIONS: None              Abran Tinajero DO  [facility]  [address] [place] [address]  Phone: [phone]  Fax: [phone]     DISPLAY PLAN FREE TEXT

## 2022-08-05 NOTE — H&P ADULT - PROBLEM SELECTOR PLAN 1
New  Imaging as above  Tmaz 100.8 at [facility]  Continue vanc, zosyn, clindamycin   Surgery consulted no intervention  Wound care consult

## 2022-08-05 NOTE — H&P ADULT - NSHPPHYSICALEXAM_GEN_ALL_CORE
PHYSICAL EXAM:  GENERAL: NAD, thin very frail female  HEAD:  Atraumatic, Normocephalic  EYES: EOMI, PERRL, conjunctiva and sclera clear  NECK: Supple, No JVD  CHEST/LUNG: Clear to auscultation bilaterally; No wheezes, rales or rhonchi  HEART: Regular rate and rhythm; No murmurs, rubs, or gallops, (+)S1, S2  ABDOMEN: Soft, Nontender, Nondistended; Normal Bowel sounds   EXTREMITIES:  2+ Peripheral Pulses, No clubbing, cyanosis, or edema  PSYCH: unable to obtain due to pt mentation  NEUROLOGY:: , unable to obtain further due to pt mentation  SKIN: stage 4 sacral decubitus ulcer

## 2022-08-05 NOTE — CHART NOTE - NSCHARTNOTEFT_GEN_A_CORE
H+P reviewed from earlier today.     52F with PMH [place]'s, dementia, HTN, recurrent UTIs with indwelling sanchez who presents as a transfer from [facility] after CT there showed a left frontal contusion with concern for traumatic SDH following a fall from bed at living facility. At OSH, pt also noted with T 100.8 and given vanco/zosyn; of note, pt has PICC line in place for IV ceftriaxone that pt was receiving at nursing home for UTI (started 8/3). Pt noted with large stage IV decubitus ulcer, with imaging showing gas tracking. Imaging also with severe stool burden and rectal distension.     Unable to obtain ROS 2/2 ?baseline dementia.     Vital Signs Last 24 Hrs  T(C): 36.3 (05 Aug 2022 11:13), Max: 37.1 (04 Aug 2022 17:25)  T(F): 97.3 (05 Aug 2022 11:13), Max: 98.7 (04 Aug 2022 17:25)  HR: 69 (05 Aug 2022 11:13) (61 - 107)  BP: 122/81 (05 Aug 2022 11:13) (110/72 - 171/108)  BP(mean): 93 (05 Aug 2022 04:12) (93 - 100)  RR: 18 (05 Aug 2022 11:13) (16 - 18)  SpO2: 99% (05 Aug 2022 11:13) (97% - 100%)    Parameters below as of 05 Aug 2022 11:13  Patient On (Oxygen Delivery Method): room air    PHYSICAL EXAM:  GENERAL: NAD, contracted and cachectic   HEAD:  Atraumatic, normocephalic  EYES: conjunctiva and sclera clear  NECK: Supple, no JVD  CHEST/LUNG: grossly clear to auscultation bilaterally; no wheezing or rales  HEART: Regular rate and rhythm; no murmurs, no LE edema   ABDOMEN: Soft, nondistended; bowel sounds present  EXTREMITIES:  2+ Peripheral Pulses, no clubbing, cyanosis; contracted LEs  PSYCH: unable to assess   NEUROLOGY: contracted, opens eye to name and stimulation, unable to assess further   SKIN: RUE PICC, +large stage IV decubitus ulcer, no foul odor, +granulation tissue, no drainage       #SDH - OSH CTH with small R subacute SDH  - repeat CTH here motion limited  - d/w NSX: no need for repeat imaging  - holding AC/AP and VTE ppx for now until cleared by NSX   #Fever - T 100.8 at OSH  - was already being treated for UTI at [facility] with ceftriaxone  -  CT showing gas tracking along decub ulcer and c/f possible sacral OM - surgery consulted in ED, low suspicion for active infection at ulcer site, recs wound care   - ID consulted for further eval of decub ulcer, need for possibe MRI to r/o sacral OM and abx management   - c/w currently regimen of vanco/zosyn/clinda pending ID recs   - f/u all culture data   #UTI - started on ceftriazone at [facility] (8/3), currently on zosyn  f/u cx data   #Constipation - s/p disimpaction in ED, c/w bowel regimen H+P reviewed from earlier today.     52F with PMH [place]'s, dementia, HTN, recurrent UTIs with indwelling sanchez who presents as a transfer from [facility] after CT there showed a left frontal contusion with concern for traumatic SDH following a fall from bed at living facility. At OSH, pt also noted with T 100.8 and given vanco/zosyn; of note, pt has PICC line in place for IV ceftriaxone that pt was receiving at nursing home for UTI (started 8/3). Pt noted with large stage IV decubitus ulcer, with imaging showing gas tracking. Imaging also with severe stool burden and rectal distension.     Unable to obtain ROS 2/2 ?baseline dementia.     Vital Signs Last 24 Hrs  T(C): 36.3 (05 Aug 2022 11:13), Max: 37.1 (04 Aug 2022 17:25)  T(F): 97.3 (05 Aug 2022 11:13), Max: 98.7 (04 Aug 2022 17:25)  HR: 69 (05 Aug 2022 11:13) (61 - 107)  BP: 122/81 (05 Aug 2022 11:13) (110/72 - 171/108)  BP(mean): 93 (05 Aug 2022 04:12) (93 - 100)  RR: 18 (05 Aug 2022 11:13) (16 - 18)  SpO2: 99% (05 Aug 2022 11:13) (97% - 100%)    Parameters below as of 05 Aug 2022 11:13  Patient On (Oxygen Delivery Method): room air    PHYSICAL EXAM:  GENERAL: NAD, contracted and cachectic   HEAD:  Atraumatic, normocephalic  EYES: conjunctiva and sclera clear  NECK: Supple, no JVD  CHEST/LUNG: grossly clear to auscultation bilaterally; no wheezing or rales  HEART: Regular rate and rhythm; no murmurs, no LE edema   ABDOMEN: Soft, nondistended; bowel sounds present  EXTREMITIES:  2+ Peripheral Pulses, no clubbing, cyanosis; contracted LEs  PSYCH: unable to assess   NEUROLOGY: contracted, opens eye to name and stimulation, unable to assess further   SKIN: RUE PICC, +large stage IV decubitus ulcer, no foul odor, +granulation tissue, no drainage       #SDH - OSH CTH with small R subacute SDH  - repeat CTH here motion limited  - d/w NSX: no need for repeat imaging  - holding AC/AP and VTE ppx for now until cleared by NSX   #Fever - T 100.8 at OSH  - was already being treated for UTI at [facility] with ceftriaxone  -  CT showing gas tracking along decub ulcer and c/f possible sacral OM - surgery consulted in ED, low suspicion for active infection at ulcer site, recs wound care   - ID consulted for further eval of decub ulcer, need for possibe MRI to r/o sacral OM and abx management   - c/w currently regimen of vanco/zosyn/clinda pending ID recs   - f/u all culture data   #UTI - started on ceftriazone at [facility] (8/3), currently on zosyn  f/u cx data   #Constipation - s/p disimpaction in ED, c/w bowel regimen    plan d/w MACKENZIE Raymond

## 2022-08-05 NOTE — CONSULT NOTE ADULT - SUBJECTIVE AND OBJECTIVE BOX
Patient is a 52y old  Female who presents with a chief complaint of subdural hematoma/ sacral decubitus ulcer (05 Aug 2022 15:09)    HPI:  62 yr old female with a pmh of Huntingtons's, dementia, HTN, recurrent UTIs with indwelling sanchez, no AC/AP who presents as a transfer from [facility] after CT there showed a left frontal contusion with concern for traumatic SDH following a fall. Hx obtained from chart review as pt unable to provide information.  Of note the ED chart could not be found by myself/nursing staff therefore pharmacy emailed for medrec.   Pt was also found to have a  sacral decubitus ulcer.  At [facility] was given vanc + zosyn ( 11am) for Tmax 100.8  ROS unable to obtain due to pt mentation  vitals: T 98.7, HR 85, /94, RR 17 satting 100% RA (05 Aug 2022 03:21)       REVIEW OF SYSTEMS  [x  ] ROS unobtainable because:  non verbal  [  ] All other systems negative except as noted below    Constitutional:  [ ] fever [ ] chills  [ ] weight loss  [ ]night sweat  [ ]poor appetite/PO intake [ ]fatigue   Skin:  [ ] rash [ ] phlebitis , stage 4 sacral wound	  Eyes: [ ] icterus [ ] pain  [ ] discharge	  ENMT: [ ] sore throat  [ ] thrush [ ] ulcers [ ] exudates [ ]anosmia  Respiratory: [ ] dyspnea [ ] hemoptysis [ ] cough [ ] sputum	  Cardiovascular:  [ ] chest pain [ ] palpitations [ ] edema	  Gastrointestinal:  [ ] nausea [ ] vomiting [ ] diarrhea [ ] constipation [ ] pain	  Genitourinary:  [ ] dysuria [ ] frequency [ ] hematuria [ ] discharge [ ] flank pain  [ ] incontinence  Musculoskeletal:  [ ] myalgias [ ] arthralgias [ ] arthritis  [ ] back pain  Neurological:  [ ] headache [ ] weakness [ ] seizures  [ ] confusion/altered mental status    prior hospital charts reviewed [V]  primary team notes reviewed [V]  other consultant notes reviewed [V]    PAST MEDICAL & SURGICAL HISTORY:  History of [place]&#x27;s disease      Dementia      HTN (hypertension)      HLD (hyperlipidemia)      No significant past surgical history          SOCIAL HISTORY:  - Denied smoking/vaping/alcohol/recreational drug use    FAMILY HISTORY:  No pertinent family history in first degree relatives        Allergies  No Known Allergies        ANTIMICROBIALS:  clindamycin IVPB 600 every 8 hours  piperacillin/tazobactam IVPB.. 3.375 every 8 hours  vancomycin  IVPB 1000 every 12 hours      ANTIMICROBIALS (past 90 days):  MEDICATIONS  (STANDING):  clindamycin IVPB   100 mL/Hr IV Intermittent (22 @ 08:33)    clindamycin IVPB   100 mL/Hr IV Intermittent (22 @ 19:51)    piperacillin/tazobactam IVPB..   25 mL/Hr IV Intermittent (22 @ 11:57)   25 mL/Hr IV Intermittent (22 @ 04:09)    piperacillin/tazobactam IVPB...   200 mL/Hr IV Intermittent (22 @ 20:53)    vancomycin  IVPB   250 mL/Hr IV Intermittent (22 @ 06:15)        OTHER MEDS:   MEDICATIONS  (STANDING):  acetaminophen     Tablet .. 650 every 6 hours PRN  aluminum hydroxide/magnesium hydroxide/simethicone Suspension 30 every 4 hours PRN  amLODIPine   Tablet 5 daily  lactulose Syrup 20 two times a day PRN  levETIRAcetam  IVPB 500 every 12 hours  melatonin 3 at bedtime PRN  ondansetron Injectable 4 every 8 hours PRN  polyethylene glycol 3350 17 two times a day  senna 2 two times a day  traZODone 150 daily      VITALS:  Vital Signs Last 24 Hrs  T(F): 98.3 (22 @ 15:46), Max: 98.7 (22 @ 17:25)    Vital Signs Last 24 Hrs  HR: 55 (22 @ 15:46) (55 - 107)  BP: 122/77 (22 @ 15:46) (110/72 - 171/108)  RR: 18 (22 @ 15:46)  SpO2: 100% (22 @ 15:46) (97% - 100%)  Wt(kg): --    EXAM:    GA: NAD, AOx3  HEENT: oral cavity no lesion  CV: nl S1/S2, no RMG  Lungs: CTAB, No distress  Abd: BS+, soft, nontender, no rebounding pain  Ext: no edema  Neuro: No focal deficits  Skin: Intact  IV: no phlebitis    Labs:                        10.6   6.72  )-----------( 698      ( 05 Aug 2022 08:52 )             33.2         142  |  106  |  <4<L>  ----------------------------<  85  4.4   |  27  |  0.48<L>    Ca    9.2      05 Aug 2022 08:52    TPro  6.1  /  Alb  2.7<L>  /  TBili  0.2  /  DBili  x   /  AST  20  /  ALT  19  /  AlkPhos  88        WBC Trend:  WBC Count: 6.72 (22 @ 08:52)  WBC Count: 11.51 (22 @ 18:35)      Auto Neutrophil #: 5.04 K/uL (22 @ 08:52)  Auto Neutrophil #: 9.95 K/uL (22 @ 18:35)      Creatine Trend:  Creatinine, Serum: 0.48 ()  Creatinine, Serum: 0.34 ()      Liver Biochemical Testing Trend:  Alanine Aminotransferase (ALT/SGPT): 19 ()  Aspartate Aminotransferase (AST/SGOT): 20 (22 @ 19:33)  Bilirubin Total, Serum: 0.2 ()      Trend LDH      Auto Eosinophil %: 0.9 % (22 @ 08:52)  Auto Eosinophil %: 0.3 % (22 @ 18:35)      Urinalysis Basic - ( 04 Aug 2022 18:35 )    Color: Light Yellow / Appearance: Slightly Turbid / S.016 / pH: x  Gluc: x / Ketone: Negative  / Bili: Negative / Urobili: Negative   Blood: x / Protein: Trace / Nitrite: Negative   Leuk Esterase: Moderate / RBC: 3 /hpf / WBC 6 /HPF   Sq Epi: x / Non Sq Epi: 3 /hpf / Bacteria: Few        MICROBIOLOGY:  Vancomycin Level, Trough: 10.7 ( @ 00:09)                                    C-Reactive Protein, Serum: 19 ()              Blood Gas Venous - Lactate: 0.7 ( @ 17:57)      Sedimentation Rate, Erythrocyte: 63 mm/hr (22 @ 19:56)    CSF:                  RADIOLOGY:  imaging reviewed by attending                CT Head No Cont:   ACC: [id number] EXAM:  CT BRAIN                          PROCEDURE DATE:  2022          INTERPRETATION:  CLINICAL INDICATION:  Headache.    TECHNIQUE : Axial CT scanning of the brain was obtained from the skull   base to the vertex without the administration of intravenous contrast.   Coronal and sagittal reformatted images were subsequently obtained.  Patient was uncooperative. Repeat imaging was performed.    COMPARISON: 2016    FINDINGS:  Motion limited exam despite 3 attempts.    There is no grossly obvious evidence of mass effect, midline shift, acute   intracranial hemorrhage, or extra-axial collections.    The ventricles, cisterns and sulci are prominent, consistent with   generalized volume loss. Atrophy of the caudate heads are unchanged.   There are moderate patchy areas of hypodensity in the periventricular and   subcortical white matter which are likely related to chronic   microangiopathic changes.    The calvarium is intact. The paranasal sinuses are clear. The mastoid air   cells are predominantly clear. The orbits are unremarkable.      IMPRESSION:  Motion limited exam. If clinically indicated, repeat imaging may be   obtained.    No grossly obvious acute intracranial hemorrhage or acute territorial   infarction. Diffuse volume loss.    --- End of Report ---           JULIAN ALICIA MD; Resident Radiologist  This document has been electronically signed.  AMIRA SOOD MD; Attending Radiologist  This document has been electronically signed. Aug  5 2022  1:05AM (22 @ 23:06)  CT Abdomen and Pelvis w/ IV Cont:   ACC: [id number] EXAM:  CT ABDOMEN AND PELVIS IC                          PROCEDURE DATE:  2022          INTERPRETATION:  CLINICAL INFORMATION: Sacral wound; concerns for   osteomyelitis.    COMPARISON: No similar prior studies available for comparison.    CONTRAST/COMPLICATIONS:  IV Contrast: Omnipaque 350  70 cc administered   0 cc discarded  Oral Contrast: NONE  Complications: None reported at time of study completion    PROCEDURE:  CT of the Abdomen and Pelvis was performed.  Sagittal and coronal reformats were performed.    FINDINGS:  LOWER CHEST: Within normal limits.    LIVER: Within normal limits.  BILE DUCTS: Normal caliber.  GALLBLADDER: Nondilated gallbladder. No pericholecystic fluid nor   appreciable gallbladder wall thickening.  SPLEEN: Within normal limits.  PANCREAS: No peripancreatic fat stranding nor pancreatic edema.  ADRENALS: Within normal limits.  KIDNEYS/URETERS: Symmetric enhancement bilaterally. No hydronephrosis.    BLADDER: Indwelling Sanchez. Visible air within the bladder; likely   secondary to recent instrumentation. No arnaud appreciable bladder   abnormality.  REPRODUCTIVE ORGANS: Globular fibroid uterus with varying degrees of   calcification.    BOWEL: Profound stool burden with severe rectal distention and rectal   wall thickening and adjacent fat stranding. Extensive fecalization of the   small bowel with dilated large and small bowel loops containing formed   feces.  PERITONEUM: No ascites.  VESSELS: Atherosclerotic changes.  RETROPERITONEUM/LYMPH NODES: No lymphadenopathy.  ABDOMINAL WALL: Visible defect of the sacrum consistent with decubitus   ulcer (series 2-88). Tracking gas along the posterior sacral fascia and   within the left greater than right gluteal musculature; gas forming   bacterial infection cannot be excluded.  BONES: Minimal degenerative changes. No appreciable erosive changes of   the sacrum to suggest sacral osteomyelitis. Suggest further evaluation   with MRI of the sacrum    IMPRESSION:  Sacral decubitus ulcer with locules of gas tracking along the superficial   sacral fascia and into the left greater than right gluteal musculature;   gas forming bacterial infection cannot be excluded.    Suggest further evaluation for suspected sacral osteomyelitis with MRI    Profound stool burden with severe rectal distention and rectal wall   thickening/adjacent fat stranding. Extensive sacralization small bowel   with multiple dilated loops of large and small bowel containing formed   feces. Findings are consistent with partial/functional bowel obstruction   originating from the impacted rectum.    Suspicion for stercoral colitis.    Findings and recommendations regarding subcutaneous gas collection and   functional bowel obstruction. discussed by Dr. Gonzalez ofradiology with   Dr. Villarreal of primary emergency department team at 7:29 PM on 2022   with read back confirmation.    --- End of Report ---           VALENTINO GONZALEZ MD; Resident Radiologist  This document has been electronically signed.  JENNIFER PEREA MD; Attending Radiologist  This document has been electronically signed. Aug  4 2022  8:23PM (22 @ 19:14)       Patient is a 52y old  Female who presents with a chief complaint of subdural hematoma/ sacral decubitus ulcer (05 Aug 2022 15:09)    HPI:  62 yr old female with a pmh of Huntingtons's, dementia, HTN, recurrent UTIs with indwelling sanchez, no AC/AP who presents as a transfer from [facility] after CT there showed a left frontal contusion with concern for traumatic SDH following a fall. Hx obtained from chart review as pt unable to provide information.  Of note the ED chart could not be found by myself/nursing staff therefore pharmacy emailed for medrec.   Pt was also found to have a  sacral decubitus ulcer.  At [facility] was given vanc + zosyn ( 11am) for Tmax 100.8  ROS unable to obtain due to pt mentation  vitals: T 98.7, HR 85, /94, RR 17 satting 100% RA (05 Aug 2022 03:21)       REVIEW OF SYSTEMS  [x  ] ROS unobtainable because:  non verbal  [  ] All other systems negative except as noted below    Constitutional:  [ ] fever [ ] chills  [ ] weight loss  [ ]night sweat  [ ]poor appetite/PO intake [ ]fatigue   Skin:  [ ] rash [ ] phlebitis , stage 4 sacral wound	  Eyes: [ ] icterus [ ] pain  [ ] discharge	  ENMT: [ ] sore throat  [ ] thrush [ ] ulcers [ ] exudates [ ]anosmia  Respiratory: [ ] dyspnea [ ] hemoptysis [ ] cough [ ] sputum	  Cardiovascular:  [ ] chest pain [ ] palpitations [ ] edema	  Gastrointestinal:  [ ] nausea [ ] vomiting [ ] diarrhea [ ] constipation [ ] pain	  Genitourinary:  [ ] dysuria [ ] frequency [ ] hematuria [ ] discharge [ ] flank pain  [ ] incontinence  Musculoskeletal:  [ ] myalgias [ ] arthralgias [ ] arthritis  [ ] back pain  Neurological:  [ ] headache [ ] weakness [ ] seizures  [ ] confusion/altered mental status    prior hospital charts reviewed [V]  primary team notes reviewed [V]  other consultant notes reviewed [V]    PAST MEDICAL & SURGICAL HISTORY:  History of [place]&#x27;s disease      Dementia      HTN (hypertension)      HLD (hyperlipidemia)      No significant past surgical history          SOCIAL HISTORY:  - Denied smoking/vaping/alcohol/recreational drug use    FAMILY HISTORY:  No pertinent family history in first degree relatives        Allergies  No Known Allergies        ANTIMICROBIALS:  clindamycin IVPB 600 every 8 hours  piperacillin/tazobactam IVPB.. 3.375 every 8 hours  vancomycin  IVPB 1000 every 12 hours      ANTIMICROBIALS (past 90 days):  MEDICATIONS  (STANDING):  clindamycin IVPB   100 mL/Hr IV Intermittent (22 @ 08:33)    clindamycin IVPB   100 mL/Hr IV Intermittent (22 @ 19:51)    piperacillin/tazobactam IVPB..   25 mL/Hr IV Intermittent (22 @ 11:57)   25 mL/Hr IV Intermittent (22 @ 04:09)    piperacillin/tazobactam IVPB...   200 mL/Hr IV Intermittent (22 @ 20:53)    vancomycin  IVPB   250 mL/Hr IV Intermittent (22 @ 06:15)        OTHER MEDS:   MEDICATIONS  (STANDING):  acetaminophen     Tablet .. 650 every 6 hours PRN  aluminum hydroxide/magnesium hydroxide/simethicone Suspension 30 every 4 hours PRN  amLODIPine   Tablet 5 daily  lactulose Syrup 20 two times a day PRN  levETIRAcetam  IVPB 500 every 12 hours  melatonin 3 at bedtime PRN  ondansetron Injectable 4 every 8 hours PRN  polyethylene glycol 3350 17 two times a day  senna 2 two times a day  traZODone 150 daily      VITALS:  Vital Signs Last 24 Hrs  T(F): 98.3 (22 @ 15:46), Max: 98.7 (22 @ 17:25)    Vital Signs Last 24 Hrs  HR: 55 (22 @ 15:46) (55 - 107)  BP: 122/77 (22 @ 15:46) (110/72 - 171/108)  RR: 18 (22 @ 15:46)  SpO2: 100% (22 @ 15:46) (97% - 100%)  Wt(kg): --    EXAM:    GA: NAD, AOx3  HEENT: oral cavity no lesion  CV: nl S1/S2, no RMG  Lungs: CTAB, No distress  Abd: BS+, soft, nontender, no rebounding pain  Ext: no edema  Neuro: No focal deficits  Skin: Intact  IV: no phlebitis    Labs:                        10.6   6.72  )-----------( 698      ( 05 Aug 2022 08:52 )             33.2         142  |  106  |  <4<L>  ----------------------------<  85  4.4   |  27  |  0.48<L>    Ca    9.2      05 Aug 2022 08:52    TPro  6.1  /  Alb  2.7<L>  /  TBili  0.2  /  DBili  x   /  AST  20  /  ALT  19  /  AlkPhos  88        WBC Trend:  WBC Count: 6.72 (22 @ 08:52)  WBC Count: 11.51 (22 @ 18:35)      Auto Neutrophil #: 5.04 K/uL (22 @ 08:52)  Auto Neutrophil #: 9.95 K/uL (22 @ 18:35)      Creatine Trend:  Creatinine, Serum: 0.48 ()  Creatinine, Serum: 0.34 ()      Liver Biochemical Testing Trend:  Alanine Aminotransferase (ALT/SGPT): 19 ()  Aspartate Aminotransferase (AST/SGOT): 20 (22 @ 19:33)  Bilirubin Total, Serum: 0.2 ()      Trend LDH      Auto Eosinophil %: 0.9 % (22 @ 08:52)  Auto Eosinophil %: 0.3 % (22 @ 18:35)      Urinalysis Basic - ( 04 Aug 2022 18:35 )    Color: Light Yellow / Appearance: Slightly Turbid / S.016 / pH: x  Gluc: x / Ketone: Negative  / Bili: Negative / Urobili: Negative   Blood: x / Protein: Trace / Nitrite: Negative   Leuk Esterase: Moderate / RBC: 3 /hpf / WBC 6 /HPF   Sq Epi: x / Non Sq Epi: 3 /hpf / Bacteria: Few        MICROBIOLOGY:  Vancomycin Level, Trough: 10.7 ( @ 00:09)    C-Reactive Protein, Serum: 19 ()    Blood Gas Venous - Lactate: 0.7 ( @ 17:57)    Sedimentation Rate, Erythrocyte: 63 mm/hr (22 @ 19:56)    RADIOLOGY:    <The imaging below has been reviewed and visualized by me independently. Findings as detailed in report below>    < from: CT Abdomen and Pelvis w/ IV Cont (22 @ 19:14) >  IMPRESSION:  Sacral decubitus ulcer with locules of gas tracking along the superficial   sacral fascia and into the left greater than right gluteal musculature;   gas forming bacterial infection cannot be excluded.    Suggest further evaluation for suspected sacral osteomyelitis with MRI    < end of copied text >                  CT Head No Cont:   ACC: [id number] EXAM:  CT BRAIN                          PROCEDURE DATE:  2022          INTERPRETATION:  CLINICAL INDICATION:  Headache.    TECHNIQUE : Axial CT scanning of the brain was obtained from the skull   base to the vertex without the administration of intravenous contrast.   Coronal and sagittal reformatted images were subsequently obtained.  Patient was uncooperative. Repeat imaging was performed.    COMPARISON: 2016    FINDINGS:  Motion limited exam despite 3 attempts.    There is no grossly obvious evidence of mass effect, midline shift, acute   intracranial hemorrhage, or extra-axial collections.    The ventricles, cisterns and sulci are prominent, consistent with   generalized volume loss. Atrophy of the caudate heads are unchanged.   There are moderate patchy areas of hypodensity in the periventricular and   subcortical white matter which are likely related to chronic   microangiopathic changes.    The calvarium is intact. The paranasal sinuses are clear. The mastoid air   cells are predominantly clear. The orbits are unremarkable.      IMPRESSION:  Motion limited exam. If clinically indicated, repeat imaging may be   obtained.    No grossly obvious acute intracranial hemorrhage or acute territorial   infarction. Diffuse volume loss.    --- End of Report ---           JULIAN ALICIA MD; Resident Radiologist  This document has been electronically signed.  AMIRA SOOD MD; Attending Radiologist  This document has been electronically signed. Aug  5 2022  1:05AM (22 @ 23:06)  CT Abdomen and Pelvis w/ IV Cont:   ACC: [id number] EXAM:  CT ABDOMEN AND PELVIS IC                          PROCEDURE DATE:  2022          INTERPRETATION:  CLINICAL INFORMATION: Sacral wound; concerns for   osteomyelitis.    COMPARISON: No similar prior studies available for comparison.    CONTRAST/COMPLICATIONS:  IV Contrast: Omnipaque 350  70 cc administered   0 cc discarded  Oral Contrast: NONE  Complications: None reported at time of study completion    PROCEDURE:  CT of the Abdomen and Pelvis was performed.  Sagittal and coronal reformats were performed.    FINDINGS:  LOWER CHEST: Within normal limits.    LIVER: Within normal limits.  BILE DUCTS: Normal caliber.  GALLBLADDER: Nondilated gallbladder. No pericholecystic fluid nor   appreciable gallbladder wall thickening.  SPLEEN: Within normal limits.  PANCREAS: No peripancreatic fat stranding nor pancreatic edema.  ADRENALS: Within normal limits.  KIDNEYS/URETERS: Symmetric enhancement bilaterally. No hydronephrosis.    BLADDER: Indwelling Sanchez. Visible air within the bladder; likely   secondary to recent instrumentation. No arnaud appreciable bladder   abnormality.  REPRODUCTIVE ORGANS: Globular fibroid uterus with varying degrees of   calcification.    BOWEL: Profound stool burden with severe rectal distention and rectal   wall thickening and adjacent fat stranding. Extensive fecalization of the   small bowel with dilated large and small bowel loops containing formed   feces.  PERITONEUM: No ascites.  VESSELS: Atherosclerotic changes.  RETROPERITONEUM/LYMPH NODES: No lymphadenopathy.  ABDOMINAL WALL: Visible defect of the sacrum consistent with decubitus   ulcer (series 2-88). Tracking gas along the posterior sacral fascia and   within the left greater than right gluteal musculature; gas forming   bacterial infection cannot be excluded.  BONES: Minimal degenerative changes. No appreciable erosive changes of   the sacrum to suggest sacral osteomyelitis. Suggest further evaluation   with MRI of the sacrum    IMPRESSION:  Sacral decubitus ulcer with locules of gas tracking along the superficial   sacral fascia and into the left greater than right gluteal musculature;   gas forming bacterial infection cannot be excluded.    Suggest further evaluation for suspected sacral osteomyelitis with MRI    Profound stool burden with severe rectal distention and rectal wall   thickening/adjacent fat stranding. Extensive sacralization small bowel   with multiple dilated loops of large and small bowel containing formed   feces. Findings are consistent with partial/functional bowel obstruction   originating from the impacted rectum.    Suspicion for stercoral colitis.    Findings and recommendations regarding subcutaneous gas collection and   functional bowel obstruction. discussed by Dr. Gonzalez ofradiology with   Dr. Villarreal of primary emergency department team at 7:29 PM on 2022   with read back confirmation.    --- End of Report ---           VALENTINO GONZALEZ MD; Resident Radiologist  This document has been electronically signed.  JENNIFER PEREA MD; Attending Radiologist  This document has been electronically signed. Aug  4 2022  8:23PM (22 @ 19:14)

## 2022-08-06 LAB
ANION GAP SERPL CALC-SCNC: 10 MMOL/L — SIGNIFICANT CHANGE UP (ref 5–17)
BUN SERPL-MCNC: 9 MG/DL — SIGNIFICANT CHANGE UP (ref 7–23)
CALCIUM SERPL-MCNC: 8.8 MG/DL — SIGNIFICANT CHANGE UP (ref 8.4–10.5)
CHLORIDE SERPL-SCNC: 105 MMOL/L — SIGNIFICANT CHANGE UP (ref 96–108)
CO2 SERPL-SCNC: 27 MMOL/L — SIGNIFICANT CHANGE UP (ref 22–31)
CREAT SERPL-MCNC: 0.85 MG/DL — SIGNIFICANT CHANGE UP (ref 0.5–1.3)
EGFR: 82 ML/MIN/1.73M2 — SIGNIFICANT CHANGE UP
GLUCOSE SERPL-MCNC: 88 MG/DL — SIGNIFICANT CHANGE UP (ref 70–99)
HCT VFR BLD CALC: 31.9 % — LOW (ref 34.5–45)
HGB BLD-MCNC: 9.9 G/DL — LOW (ref 11.5–15.5)
MCHC RBC-ENTMCNC: 30.1 PG — SIGNIFICANT CHANGE UP (ref 27–34)
MCHC RBC-ENTMCNC: 31 GM/DL — LOW (ref 32–36)
MCV RBC AUTO: 97 FL — SIGNIFICANT CHANGE UP (ref 80–100)
NRBC # BLD: 0 /100 WBCS — SIGNIFICANT CHANGE UP (ref 0–0)
PLATELET # BLD AUTO: 628 K/UL — HIGH (ref 150–400)
POTASSIUM SERPL-MCNC: 3.8 MMOL/L — SIGNIFICANT CHANGE UP (ref 3.5–5.3)
POTASSIUM SERPL-SCNC: 3.8 MMOL/L — SIGNIFICANT CHANGE UP (ref 3.5–5.3)
RBC # BLD: 3.29 M/UL — LOW (ref 3.8–5.2)
RBC # FLD: 15.4 % — HIGH (ref 10.3–14.5)
SODIUM SERPL-SCNC: 142 MMOL/L — SIGNIFICANT CHANGE UP (ref 135–145)
WBC # BLD: 7.61 K/UL — SIGNIFICANT CHANGE UP (ref 3.8–10.5)
WBC # FLD AUTO: 7.61 K/UL — SIGNIFICANT CHANGE UP (ref 3.8–10.5)

## 2022-08-06 PROCEDURE — 99233 SBSQ HOSP IP/OBS HIGH 50: CPT

## 2022-08-06 PROCEDURE — 99223 1ST HOSP IP/OBS HIGH 75: CPT

## 2022-08-06 RX ORDER — ASCORBIC ACID 60 MG
500 TABLET,CHEWABLE ORAL DAILY
Refills: 0 | Status: DISCONTINUED | OUTPATIENT
Start: 2022-08-06 | End: 2022-08-08

## 2022-08-06 RX ORDER — LANOLIN ALCOHOL/MO/W.PET/CERES
5 CREAM (GRAM) TOPICAL AT BEDTIME
Refills: 0 | Status: DISCONTINUED | OUTPATIENT
Start: 2022-08-06 | End: 2022-08-08

## 2022-08-06 RX ORDER — QUETIAPINE FUMARATE 200 MG/1
25 TABLET, FILM COATED ORAL EVERY 6 HOURS
Refills: 0 | Status: DISCONTINUED | OUTPATIENT
Start: 2022-08-06 | End: 2022-08-08

## 2022-08-06 RX ORDER — CHLORHEXIDINE GLUCONATE 213 G/1000ML
1 SOLUTION TOPICAL DAILY
Refills: 0 | Status: DISCONTINUED | OUTPATIENT
Start: 2022-08-06 | End: 2022-08-08

## 2022-08-06 RX ORDER — SODIUM CHLORIDE 9 MG/ML
1000 INJECTION, SOLUTION INTRAVENOUS
Refills: 0 | Status: DISCONTINUED | OUTPATIENT
Start: 2022-08-06 | End: 2022-08-08

## 2022-08-06 RX ORDER — OLANZAPINE 15 MG/1
2.5 TABLET, FILM COATED ORAL EVERY 8 HOURS
Refills: 0 | Status: DISCONTINUED | OUTPATIENT
Start: 2022-08-06 | End: 2022-08-08

## 2022-08-06 RX ORDER — ACETAMINOPHEN 500 MG
650 TABLET ORAL EVERY 6 HOURS
Refills: 0 | Status: DISCONTINUED | OUTPATIENT
Start: 2022-08-06 | End: 2022-08-08

## 2022-08-06 RX ADMIN — PIPERACILLIN AND TAZOBACTAM 25 GRAM(S): 4; .5 INJECTION, POWDER, LYOPHILIZED, FOR SOLUTION INTRAVENOUS at 20:15

## 2022-08-06 RX ADMIN — LEVETIRACETAM 400 MILLIGRAM(S): 250 TABLET, FILM COATED ORAL at 11:16

## 2022-08-06 RX ADMIN — SENNA PLUS 2 TABLET(S): 8.6 TABLET ORAL at 05:23

## 2022-08-06 RX ADMIN — POLYETHYLENE GLYCOL 3350 17 GRAM(S): 17 POWDER, FOR SOLUTION ORAL at 17:39

## 2022-08-06 RX ADMIN — PIPERACILLIN AND TAZOBACTAM 25 GRAM(S): 4; .5 INJECTION, POWDER, LYOPHILIZED, FOR SOLUTION INTRAVENOUS at 12:29

## 2022-08-06 RX ADMIN — Medication 650 MILLIGRAM(S): at 21:20

## 2022-08-06 RX ADMIN — Medication 650 MILLIGRAM(S): at 20:38

## 2022-08-06 RX ADMIN — Medication 5 MILLIGRAM(S): at 21:30

## 2022-08-06 RX ADMIN — Medication 1 TABLET(S): at 17:39

## 2022-08-06 RX ADMIN — SENNA PLUS 2 TABLET(S): 8.6 TABLET ORAL at 17:39

## 2022-08-06 RX ADMIN — LEVETIRACETAM 400 MILLIGRAM(S): 250 TABLET, FILM COATED ORAL at 22:07

## 2022-08-06 RX ADMIN — PIPERACILLIN AND TAZOBACTAM 25 GRAM(S): 4; .5 INJECTION, POWDER, LYOPHILIZED, FOR SOLUTION INTRAVENOUS at 03:08

## 2022-08-06 RX ADMIN — SODIUM CHLORIDE 100 MILLILITER(S): 9 INJECTION, SOLUTION INTRAVENOUS at 19:25

## 2022-08-06 RX ADMIN — AMLODIPINE BESYLATE 5 MILLIGRAM(S): 2.5 TABLET ORAL at 05:23

## 2022-08-06 RX ADMIN — Medication 500 MILLIGRAM(S): at 17:39

## 2022-08-06 RX ADMIN — QUETIAPINE FUMARATE 25 MILLIGRAM(S): 200 TABLET, FILM COATED ORAL at 20:38

## 2022-08-06 RX ADMIN — CHLORHEXIDINE GLUCONATE 1 APPLICATION(S): 213 SOLUTION TOPICAL at 12:29

## 2022-08-06 RX ADMIN — Medication 150 MILLIGRAM(S): at 12:30
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
## 2022-08-06 NOTE — PHYSICAL THERAPY INITIAL EVALUATION ADULT - PERTINENT HX OF CURRENT PROBLEM, REHAB EVAL
62 yr old female with a pmh of Huntingtons's, dementia, HTN, recurrent UTIs with indwelling sanchez, no AC/AP who presents as a transfer from [facility] after CT there showed a left frontal contusion with concern for traumatic SDH following a fall. CT Head/ab/pelvis 8/4, acute ICH, suspected sacral OM, suspected stercoral colitis, CXR (-), ECG (-).

## 2022-08-06 NOTE — BH CONSULTATION LIAISON ASSESSMENT NOTE - NSBHATTESTCOMMENTATTENDFT_PSY_A_CORE
This is a 52-y.o. AAF patient, non-verbal, with a history of [place]'s disease and dementia presenting after a fall now with agitation. Consult requested to evaluate patient for agitation.    I have seen and evaluated this patient myself. Chart, labs, meds reviewed. I agree with resident's assessment and plan.

## 2022-08-06 NOTE — DIETITIAN INITIAL EVALUATION ADULT - NSICDXPASTMEDICALHX_GEN_ALL_CORE_FT
PAST MEDICAL HISTORY:  Dementia     History of Claiborne's disease     HLD (hyperlipidemia)     HTN (hypertension)

## 2022-08-06 NOTE — BH CONSULTATION LIAISON ASSESSMENT NOTE - NSICDXPASTMEDICALHX_GEN_ALL_CORE_FT
PAST MEDICAL HISTORY:  Dementia     History of Coffey's disease     HLD (hyperlipidemia)     HTN (hypertension)

## 2022-08-06 NOTE — DIETITIAN INITIAL EVALUATION ADULT - NSFNSPHYEXAMSKINFT_GEN_A_CORE
Pressure Injury 1: sacrum, Stage IV  Pressure Injury 2: none, none  Pressure Injury 3: none, none  Pressure Injury 4: none, none  Pressure Injury 5: none, none  Pressure Injury 6: none, none  Pressure Injury 7: none, none  Pressure Injury 8: none, none  Pressure Injury 9: none, none  Pressure Injury 10: none, none  Pressure Injury 11: none, none, Pressure Injury 1: sacrum, Stage IV  Pressure Injury 2: none, none  Pressure Injury 3: none, none  Pressure Injury 4: none, none  Pressure Injury 5: none, none  Pressure Injury 6: none, none  Pressure Injury 7: none, none  Pressure Injury 8: none, none  Pressure Injury 9: none, none  Pressure Injury 10: none, none  Pressure Injury 11: none, none
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
## 2022-08-06 NOTE — BH CONSULTATION LIAISON ASSESSMENT NOTE - RISK ASSESSMENT
Patient is at acute risk of harm to self and others given lack of insight and ability to communicate. This risk is mitigated by the patient's mobility limitations. The patient is chronically at risk given inability to care for self and terminal diagnosis.

## 2022-08-06 NOTE — DIETITIAN INITIAL EVALUATION ADULT - ENERGY INTAKE
unable to assess-non documented. RD observed lunch tray with <25% consumed./Receiving tube feeding and tolerating @ goal rate

## 2022-08-06 NOTE — BH CONSULTATION LIAISON ASSESSMENT NOTE - CURRENT MEDICATION
MEDICATIONS  (STANDING):  amLODIPine   Tablet 5 milliGRAM(s) Oral daily  chlorhexidine 4% Liquid 1 Application(s) Topical daily  levETIRAcetam  IVPB 500 milliGRAM(s) IV Intermittent every 12 hours  piperacillin/tazobactam IVPB.. 3.375 Gram(s) IV Intermittent every 8 hours  polyethylene glycol 3350 17 Gram(s) Oral two times a day  senna 2 Tablet(s) Oral two times a day  traZODone 150 milliGRAM(s) Oral daily    MEDICATIONS  (PRN):  acetaminophen     Tablet .. 650 milliGRAM(s) Oral every 6 hours PRN Temp greater or equal to 38C (100.4F), Mild Pain (1 - 3)  aluminum hydroxide/magnesium hydroxide/simethicone Suspension 30 milliLiter(s) Oral every 4 hours PRN Dyspepsia  lactulose Syrup 20 Gram(s) Oral two times a day PRN constipation  melatonin 3 milliGRAM(s) Oral at bedtime PRN Insomnia  ondansetron Injectable 4 milliGRAM(s) IV Push every 8 hours PRN Nausea and/or Vomiting

## 2022-08-06 NOTE — PHYSICAL THERAPY INITIAL EVALUATION ADULT - MODALITIES TREATMENT COMMENTS
PT WC consult received for VAC dressing application to sacral st 4 PU, elli session well, wound received C/D/I, measured: 5.0cmx11.0cmx1.5cm, +U 10:00 to 2:00 and 4:00 to 8:00, with greatest depth of 3.8cm at 6:00, no erythema, no purulent, no malodor, cleansed with NS, cavilon to periwound, adaptic touch to cover the bone, filled with black foam, tracked to Rt ante hip with good seal reached at 125mmHg continuous,

## 2022-08-06 NOTE — DIETITIAN INITIAL EVALUATION ADULT - PERTINENT LABORATORY DATA
08-06    142  |  105  |  9   ----------------------------<  88  3.8   |  27  |  0.85    Ca    8.8      06 Aug 2022 06:57    TPro  6.1  /  Alb  2.7<L>  /  TBili  0.2  /  DBili  x   /  AST  20  /  ALT  19  /  AlkPhos  88  08-04

## 2022-08-06 NOTE — BH CONSULTATION LIAISON ASSESSMENT NOTE - NSBHCHARTREVIEWVS_PSY_A_CORE FT
Vital Signs Last 24 Hrs  T(C): 36.7 (06 Aug 2022 11:52), Max: 36.8 (05 Aug 2022 15:46)  T(F): 98 (06 Aug 2022 11:52), Max: 98.3 (05 Aug 2022 15:46)  HR: 80 (06 Aug 2022 11:52) (55 - 81)  BP: 130/78 (06 Aug 2022 11:52) (122/77 - 133/60)  BP(mean): --  RR: 18 (06 Aug 2022 11:52) (18 - 18)  SpO2: 99% (06 Aug 2022 11:52) (98% - 100%)    Parameters below as of 06 Aug 2022 11:52  Patient On (Oxygen Delivery Method): room air

## 2022-08-06 NOTE — PROGRESS NOTE ADULT - SUBJECTIVE AND OBJECTIVE BOX
Patient is a 52y old  Female who presents with a chief complaint of subdural hematoma/ sacral decubitus ulcer (06 Aug 2022 17:36)        SUBJECTIVE / OVERNIGHT EVENTS: not able to give ros due to poor mental status.        MEDICATIONS  (STANDING):  amLODIPine   Tablet 5 milliGRAM(s) Oral daily  ascorbic acid 500 milliGRAM(s) Oral daily  chlorhexidine 4% Liquid 1 Application(s) Topical daily  lactated ringers. 1000 milliLiter(s) (100 mL/Hr) IV Continuous <Continuous>  levETIRAcetam  IVPB 500 milliGRAM(s) IV Intermittent every 12 hours  melatonin 5 milliGRAM(s) Oral at bedtime  multivitamin 1 Tablet(s) Oral daily  piperacillin/tazobactam IVPB.. 3.375 Gram(s) IV Intermittent every 8 hours  polyethylene glycol 3350 17 Gram(s) Oral two times a day  senna 2 Tablet(s) Oral two times a day  traZODone 150 milliGRAM(s) Oral daily    MEDICATIONS  (PRN):  acetaminophen     Tablet .. 650 milliGRAM(s) Oral every 6 hours PRN Temp greater or equal to 38C (100.4F), Mild Pain (1 - 3), Moderate Pain (4 - 6)  aluminum hydroxide/magnesium hydroxide/simethicone Suspension 30 milliLiter(s) Oral every 4 hours PRN Dyspepsia  lactulose Syrup 20 Gram(s) Oral two times a day PRN constipation  OLANZapine Injectable 2.5 milliGRAM(s) IntraMuscular every 8 hours PRN agitation  ondansetron Injectable 4 milliGRAM(s) IV Push every 8 hours PRN Nausea and/or Vomiting  QUEtiapine 25 milliGRAM(s) Oral every 6 hours PRN agitation      Vital Signs Last 24 Hrs  T(C): 36.1 (06 Aug 2022 21:28), Max: 36.7 (06 Aug 2022 11:52)  T(F): 97 (06 Aug 2022 21:28), Max: 98 (06 Aug 2022 11:52)  HR: 80 (06 Aug 2022 21:28) (79 - 80)  BP: 141/89 (06 Aug 2022 21:28) (130/78 - 141/89)  BP(mean): --  RR: 18 (06 Aug 2022 21:28) (18 - 18)  SpO2: 100% (06 Aug 2022 21:28) (99% - 100%)    Parameters below as of 06 Aug 2022 21:28  Patient On (Oxygen Delivery Method): room air      CAPILLARY BLOOD GLUCOSE        I&O's Summary    05 Aug 2022 07:01  -  06 Aug 2022 07:00  --------------------------------------------------------  IN: 200 mL / OUT: 1400 mL / NET: -1200 mL    06 Aug 2022 07:01  -  06 Aug 2022 22:03  --------------------------------------------------------  IN: 200 mL / OUT: 0 mL / NET: 200 mL          PHYSICAL EXAM:   GENERAL: NAD, thin  HEAD:  Atraumatic, Normocephalic  EYES: EOMI, PERRLA, conjunctiva and sclera clear  NECK: Supple,   CHEST/LUNG: Clear to auscultation bilaterally; No wheeze  HEART: S1S2 normal. Regular rate and rhythm; No murmurs, rubs, or gallops  ABDOMEN: Soft, Nontender, Nondistended; Bowel sounds present  EXTREMITIES:  No clubbing, cyanosis, or edema  PSYCH/Neuro: Mentally challenged.   SKIN: No rashes or lesions      LABS:                        9.9    7.61  )-----------( 628      ( 06 Aug 2022 06:54 )             31.9     08-06    142  |  105  |  9   ----------------------------<  88  3.8   |  27  |  0.85    Ca    8.8      06 Aug 2022 06:57                  RADIOLOGY & ADDITIONAL TESTS:    Imaging Personally Reviewed:  Consultant(s) Notes Reviewed:    Care Discussed with Consultants/Other Providers:   Patient is a 52y old  Female who presents with a chief complaint of subdural hematoma/ sacral decubitus ulcer (06 Aug 2022 17:36)        SUBJECTIVE / OVERNIGHT EVENTS: not able to give ros due to poor mental status.        MEDICATIONS  (STANDING):  amLODIPine   Tablet 5 milliGRAM(s) Oral daily  ascorbic acid 500 milliGRAM(s) Oral daily  chlorhexidine 4% Liquid 1 Application(s) Topical daily  lactated ringers. 1000 milliLiter(s) (100 mL/Hr) IV Continuous <Continuous>  levETIRAcetam  IVPB 500 milliGRAM(s) IV Intermittent every 12 hours  melatonin 5 milliGRAM(s) Oral at bedtime  multivitamin 1 Tablet(s) Oral daily  piperacillin/tazobactam IVPB.. 3.375 Gram(s) IV Intermittent every 8 hours  polyethylene glycol 3350 17 Gram(s) Oral two times a day  senna 2 Tablet(s) Oral two times a day  traZODone 150 milliGRAM(s) Oral daily    MEDICATIONS  (PRN):  acetaminophen     Tablet .. 650 milliGRAM(s) Oral every 6 hours PRN Temp greater or equal to 38C (100.4F), Mild Pain (1 - 3), Moderate Pain (4 - 6)  aluminum hydroxide/magnesium hydroxide/simethicone Suspension 30 milliLiter(s) Oral every 4 hours PRN Dyspepsia  lactulose Syrup 20 Gram(s) Oral two times a day PRN constipation  OLANZapine Injectable 2.5 milliGRAM(s) IntraMuscular every 8 hours PRN agitation  ondansetron Injectable 4 milliGRAM(s) IV Push every 8 hours PRN Nausea and/or Vomiting  QUEtiapine 25 milliGRAM(s) Oral every 6 hours PRN agitation      Vital Signs Last 24 Hrs  T(C): 36.1 (06 Aug 2022 21:28), Max: 36.7 (06 Aug 2022 11:52)  T(F): 97 (06 Aug 2022 21:28), Max: 98 (06 Aug 2022 11:52)  HR: 80 (06 Aug 2022 21:28) (79 - 80)  BP: 141/89 (06 Aug 2022 21:28) (130/78 - 141/89)  BP(mean): --  RR: 18 (06 Aug 2022 21:28) (18 - 18)  SpO2: 100% (06 Aug 2022 21:28) (99% - 100%)    Parameters below as of 06 Aug 2022 21:28  Patient On (Oxygen Delivery Method): room air      CAPILLARY BLOOD GLUCOSE        I&O's Summary    05 Aug 2022 07:01  -  06 Aug 2022 07:00  --------------------------------------------------------  IN: 200 mL / OUT: 1400 mL / NET: -1200 mL    06 Aug 2022 07:01  -  06 Aug 2022 22:03  --------------------------------------------------------  IN: 200 mL / OUT: 0 mL / NET: 200 mL          PHYSICAL EXAM:   GENERAL: NAD, thin  HEAD:  Atraumatic, Normocephalic  EYES: EOMI, PERRLA, conjunctiva and sclera clear  NECK: Supple,   CHEST/LUNG: Clear to auscultation bilaterally; No wheeze  HEART: S1S2 normal. Regular rate and rhythm; No murmurs, rubs, or gallops  ABDOMEN: Soft, Nontender, Nondistended; Bowel sounds present  EXTREMITIES:  No clubbing, cyanosis, or edema  PSYCH/Neuro: Mentally challenged.   SKIN: No rashes or lesions      LABS:                        9.9    7.61  )-----------( 628      ( 06 Aug 2022 06:54 )             31.9     08-06    142  |  105  |  9   ----------------------------<  88  3.8   |  27  |  0.85    Ca    8.8      06 Aug 2022 06:57            RADIOLOGY & ADDITIONAL TESTS:    Imaging Personally Reviewed:  Consultant(s) Notes Reviewed:  psych  Care Discussed with Consultants/Other Providers:

## 2022-08-07 LAB
FERRITIN SERPL-MCNC: 284 NG/ML — HIGH (ref 15–150)
FOLATE SERPL-MCNC: 10.9 NG/ML — SIGNIFICANT CHANGE UP
IRON SATN MFR SERPL: 11 % — LOW (ref 14–50)
IRON SATN MFR SERPL: 22 UG/DL — LOW (ref 30–160)
MRSA PCR RESULT.: SIGNIFICANT CHANGE UP
PROCALCITONIN SERPL-MCNC: 0.11 NG/ML — HIGH (ref 0.02–0.1)
S AUREUS DNA NOSE QL NAA+PROBE: SIGNIFICANT CHANGE UP
TIBC SERPL-MCNC: 203 UG/DL — LOW (ref 220–430)
UIBC SERPL-MCNC: 181 UG/DL — SIGNIFICANT CHANGE UP (ref 110–370)
VIT B12 SERPL-MCNC: 810 PG/ML — SIGNIFICANT CHANGE UP (ref 232–1245)

## 2022-08-07 PROCEDURE — 99232 SBSQ HOSP IP/OBS MODERATE 35: CPT

## 2022-08-07 RX ADMIN — Medication 500 MILLIGRAM(S): at 12:04

## 2022-08-07 RX ADMIN — AMLODIPINE BESYLATE 5 MILLIGRAM(S): 2.5 TABLET ORAL at 06:18

## 2022-08-07 RX ADMIN — PIPERACILLIN AND TAZOBACTAM 25 GRAM(S): 4; .5 INJECTION, POWDER, LYOPHILIZED, FOR SOLUTION INTRAVENOUS at 03:21

## 2022-08-07 RX ADMIN — POLYETHYLENE GLYCOL 3350 17 GRAM(S): 17 POWDER, FOR SOLUTION ORAL at 06:18

## 2022-08-07 RX ADMIN — POLYETHYLENE GLYCOL 3350 17 GRAM(S): 17 POWDER, FOR SOLUTION ORAL at 17:22

## 2022-08-07 RX ADMIN — LEVETIRACETAM 400 MILLIGRAM(S): 250 TABLET, FILM COATED ORAL at 21:10

## 2022-08-07 RX ADMIN — Medication 150 MILLIGRAM(S): at 12:04

## 2022-08-07 RX ADMIN — Medication 5 MILLIGRAM(S): at 21:06

## 2022-08-07 RX ADMIN — OLANZAPINE 2.5 MILLIGRAM(S): 15 TABLET, FILM COATED ORAL at 21:05

## 2022-08-07 RX ADMIN — QUETIAPINE FUMARATE 25 MILLIGRAM(S): 200 TABLET, FILM COATED ORAL at 21:06

## 2022-08-07 RX ADMIN — LEVETIRACETAM 400 MILLIGRAM(S): 250 TABLET, FILM COATED ORAL at 09:32

## 2022-08-07 RX ADMIN — CHLORHEXIDINE GLUCONATE 1 APPLICATION(S): 213 SOLUTION TOPICAL at 12:08

## 2022-08-07 RX ADMIN — Medication 1 TABLET(S): at 12:03

## 2022-08-07 RX ADMIN — SENNA PLUS 2 TABLET(S): 8.6 TABLET ORAL at 17:21

## 2022-08-07 RX ADMIN — SENNA PLUS 2 TABLET(S): 8.6 TABLET ORAL at 06:15

## 2022-08-07 NOTE — PROGRESS NOTE ADULT - SUBJECTIVE AND OBJECTIVE BOX
Patient is a 52y old  Female who presents with a chief complaint of subdural hematoma/ sacral decubitus ulcer (07 Aug 2022 18:06)        SUBJECTIVE / OVERNIGHT EVENTS: non-verbal. unable to give ROS due to dementia.       MEDICATIONS  (STANDING):  amLODIPine   Tablet 5 milliGRAM(s) Oral daily  ascorbic acid 500 milliGRAM(s) Oral daily  chlorhexidine 4% Liquid 1 Application(s) Topical daily  lactated ringers. 1000 milliLiter(s) (100 mL/Hr) IV Continuous <Continuous>  levETIRAcetam  IVPB 500 milliGRAM(s) IV Intermittent every 12 hours  melatonin 5 milliGRAM(s) Oral at bedtime  multivitamin 1 Tablet(s) Oral daily  polyethylene glycol 3350 17 Gram(s) Oral two times a day  senna 2 Tablet(s) Oral two times a day  traZODone 150 milliGRAM(s) Oral daily    MEDICATIONS  (PRN):  acetaminophen     Tablet .. 650 milliGRAM(s) Oral every 6 hours PRN Temp greater or equal to 38C (100.4F), Mild Pain (1 - 3), Moderate Pain (4 - 6)  aluminum hydroxide/magnesium hydroxide/simethicone Suspension 30 milliLiter(s) Oral every 4 hours PRN Dyspepsia  lactulose Syrup 20 Gram(s) Oral two times a day PRN constipation  OLANZapine Injectable 2.5 milliGRAM(s) IntraMuscular every 8 hours PRN agitation  ondansetron Injectable 4 milliGRAM(s) IV Push every 8 hours PRN Nausea and/or Vomiting  QUEtiapine 25 milliGRAM(s) Oral every 6 hours PRN agitation      Vital Signs Last 24 Hrs  T(C): 36.5 (08 Aug 2022 05:35), Max: 36.7 (07 Aug 2022 20:39)  T(F): 97.7 (08 Aug 2022 05:35), Max: 98 (07 Aug 2022 20:39)  HR: 61 (08 Aug 2022 05:35) (61 - 69)  BP: 134/83 (08 Aug 2022 05:35) (129/88 - 134/83)  BP(mean): --  RR: 17 (08 Aug 2022 05:35) (17 - 18)  SpO2: 100% (08 Aug 2022 05:35) (100% - 100%)    Parameters below as of 08 Aug 2022 05:35  Patient On (Oxygen Delivery Method): room air      CAPILLARY BLOOD GLUCOSE        I&O's Summary    07 Aug 2022 07:01  -  08 Aug 2022 07:00  --------------------------------------------------------  IN: 220 mL / OUT: 800 mL / NET: -580 mL    08 Aug 2022 07:01  -  08 Aug 2022 11:20  --------------------------------------------------------  IN: 100 mL / OUT: 0 mL / NET: 100 mL          PHYSICAL EXAM:   GENERAL: NAD,    HEAD:  Atraumatic, Normocephalic  EYES:  conjunctiva and sclera clear  NECK: Supple,    CHEST/LUNG: Clear to auscultation bilaterally; No wheeze  HEART: S1S2 normal. Regular rate and rhythm; No murmurs, rubs, or gallops  ABDOMEN: Soft, Nontender, Nondistended; Bowel sounds present; [place].   EXTREMITIES: No clubbing, cyanosis, or edema  PSYCH/Neuro: Awake but unable to vocalize words.       LABS:                      RADIOLOGY & ADDITIONAL TESTS:    Imaging Personally Reviewed:  Consultant(s) Notes Reviewed:  ID  Care Discussed with Consultants/Other Providers: ID

## 2022-08-07 NOTE — PROGRESS NOTE ADULT - SUBJECTIVE AND OBJECTIVE BOX
Patient is a 52y old  Female who presents with a chief complaint of subdural hematoma/ sacral decubitus ulcer (06 Aug 2022 17:36)    Being followed by ID for fever    Interval history:  Afebrile since admission  Normalization of WBC  No acute events      ROS:  Unable due to mental status      Antimicrobials:    piperacillin/tazobactam IVPB.. 3.375 Gram(s) IV Intermittent every 8 hours      Vital Signs Last 24 Hrs  T(C): 36.2 (08-07-22 @ 05:28), Max: 36.7 (08-06-22 @ 11:52)  T(F): 97.2 (08-07-22 @ 05:28), Max: 98 (08-06-22 @ 11:52)  HR: 79 (08-07-22 @ 05:28) (79 - 80)  BP: 130/69 (08-07-22 @ 05:28) (130/69 - 141/89)  BP(mean): --  RR: 18 (08-07-22 @ 05:28) (18 - 18)  SpO2: 100% (08-07-22 @ 05:28) (99% - 100%)    Physical Exam:    Constitutional well preserved, NAD    HEENT No pallor or icterus    Neck no LN    Chest Clear to auscultation    CVS S1 S2 WNl No murmur or rub or gallop    Abd soft BS normal No tenderness no masses, sacral decubitus    Ext No cyanosis clubbing or edema, contractures    IV site no erythema tenderness or discharge    Joints no swelling or LOM    CNS Non verbal    Lab Data:                          9.9    7.61  )-----------( 628      ( 06 Aug 2022 06:54 )             31.9       08-06    142  |  105  |  9   ----------------------------<  88  3.8   |  27  |  0.85    Ca    8.8      06 Aug 2022 06:57          Clean Catch Clean Catch (Midstream)  08-04-22   No growth  --  --      .Blood Blood-Peripheral  08-04-22   No growth to date.  --  --      .Blood Blood-Peripheral  08-04-22   No growth to date.  --  --      < from: CT Head No Cont (08.04.22 @ 23:06) >  ACC: [id number] EXAM:  CT BRAIN                          PROCEDURE DATE:  08/04/2022          INTERPRETATION:  CLINICAL INDICATION:  Headache.    TECHNIQUE : Axial CT scanning of the brain was obtained from the skull   base to the vertex without the administration of intravenous contrast.   Coronal and sagittal reformatted images were subsequently obtained.  Patient was uncooperative. Repeat imaging was performed.    COMPARISON: 2/26/2016    FINDINGS:  Motion limited exam despite 3 attempts.    There is no grossly obvious evidence of mass effect, midline shift, acute   intracranial hemorrhage, or extra-axial collections.    The ventricles, cisterns and sulci are prominent, consistent with   generalized volume loss. Atrophy of the caudate heads are unchanged.   There are moderate patchy areas of hypodensity in the periventricular and   subcortical white matter which are likely related to chronic   microangiopathic changes.    The calvarium is intact. The paranasal sinuses are clear. The mastoid air   cells are predominantly clear. The orbits are unremarkable.      IMPRESSION:  Motion limited exam. If clinically indicated, repeat imaging may be   obtained.    No grossly obvious acute intracranial hemorrhage or acute territorial   infarction. Diffuse volume loss.    < end of copied text >  < from: Xray Chest 1 View AP/PA (08.04.22 @ 19:59) >  ACC: [id number] EXAM:  XR CHEST AP OR PA 1V                          PROCEDURE DATE:  08/04/2022          INTERPRETATION:  EXAMINATION: XR CHEST    CLINICAL INDICATION: Fever    TECHNIQUE: Single frontal portable view of the chest from 8/4/2022 7:59 PM    COMPARISON: None.    FINDINGS:    The heart size is normal.  The lungs are clear. Low lung volumes.  There is no pneumothorax or pleural effusion.  No pneumoperitoneum.    IMPRESSION:  Clear lungs.    < end of copied text >  < from: CT Abdomen and Pelvis w/ IV Cont (08.04.22 @ 19:14) >  ACC: [id number] EXAM:  CT ABDOMEN AND PELVIS IC                          PROCEDURE DATE:  08/04/2022          INTERPRETATION:  CLINICAL INFORMATION: Sacral wound; concerns for   osteomyelitis.    COMPARISON: No similar prior studies available for comparison.    CONTRAST/COMPLICATIONS:  IV Contrast: Omnipaque 350  70 cc administered   0 cc discarded  Oral Contrast: NONE  Complications: None reported at time of study completion    PROCEDURE:  CT of the Abdomen and Pelvis was performed.  Sagittal and coronal reformats were performed.    FINDINGS:  LOWER CHEST: Within normal limits.    LIVER: Within normal limits.  BILE DUCTS: Normal caliber.  GALLBLADDER: Nondilated gallbladder. No pericholecystic fluid nor   appreciable gallbladder wall thickening.  SPLEEN: Within normal limits.  PANCREAS: No peripancreatic fat stranding nor pancreatic edema.  ADRENALS: Within normal limits.  KIDNEYS/URETERS: Symmetric enhancement bilaterally. No hydronephrosis.    BLADDER: Indwelling Lugo. Visible air within the bladder; likely   secondary to recent instrumentation. No arnaud appreciable bladder   abnormality.  REPRODUCTIVE ORGANS: Globular fibroid uterus with varying degrees of   calcification.    BOWEL: Profound stool burden with severe rectal distention and rectal   wall thickening and adjacent fat stranding. Extensive fecalization of the   small bowel with dilated large and small bowel loops containing formed   feces.  PERITONEUM: No ascites.  VESSELS: Atherosclerotic changes.  RETROPERITONEUM/LYMPH NODES: No lymphadenopathy.  ABDOMINAL WALL: Visible defect of the sacrum consistent with decubitus   ulcer (series 2-88). Tracking gas along the posterior sacral fascia and   within the left greater than right gluteal musculature; gas forming   bacterial infection cannot be excluded.  BONES: Minimal degenerative changes. No appreciable erosive changes of   the sacrum to suggest sacral osteomyelitis. Suggest further evaluation   with MRI of the sacrum    IMPRESSION:  Sacral decubitus ulcer with locules of gas tracking along the superficial   sacral fascia and into the left greater than right gluteal musculature;   gas forming bacterial infection cannot be excluded.    Suggest further evaluation for suspected sacral osteomyelitis with MRI    Profound stool burden with severe rectal distention and rectal wall   thickening/adjacent fat stranding. Extensive sacralization small bowel   with multiple dilated loops of large and small bowel containing formed   feces. Findings are consistent with partial/functional bowel obstruction   originating from the impacted rectum.    Suspicion for stercoral colitis.    < end of copied text >                     Patient is a 52y old  Female who presents with a chief complaint of subdural hematoma/ sacral decubitus ulcer (06 Aug 2022 17:36)    Being followed by ID for fever    Interval history:  Afebrile since admission  Normalization of WBC  No acute events      ROS:  Unable due to mental status      Antimicrobials:    piperacillin/tazobactam IVPB.. 3.375 Gram(s) IV Intermittent every 8 hours      Vital Signs Last 24 Hrs  T(C): 36.2 (08-07-22 @ 05:28), Max: 36.7 (08-06-22 @ 11:52)  T(F): 97.2 (08-07-22 @ 05:28), Max: 98 (08-06-22 @ 11:52)  HR: 79 (08-07-22 @ 05:28) (79 - 80)  BP: 130/69 (08-07-22 @ 05:28) (130/69 - 141/89)  BP(mean): --  RR: 18 (08-07-22 @ 05:28) (18 - 18)  SpO2: 100% (08-07-22 @ 05:28) (99% - 100%)    Physical Exam:    Constitutional well preserved, NAD    HEENT No pallor or icterus    Neck no LN    Chest Clear to auscultation    CVS S1 S2 WNl No murmur or rub or gallop    Abd soft BS normal No tenderness no masses, sacral decubitus, sanchez    Ext No cyanosis clubbing or edema, contractures    IV site no erythema tenderness or discharge    Joints no swelling or LOM    CNS Non verbal, opens eyes to touch    Lab Data:                          9.9    7.61  )-----------( 628      ( 06 Aug 2022 06:54 )             31.9       08-06    142  |  105  |  9   ----------------------------<  88  3.8   |  27  |  0.85    Ca    8.8      06 Aug 2022 06:57          Clean Catch Clean Catch (Midstream)  08-04-22   No growth  --  --      .Blood Blood-Peripheral  08-04-22   No growth to date.  --  --      .Blood Blood-Peripheral  08-04-22   No growth to date.  --  --      < from: CT Head No Cont (08.04.22 @ 23:06) >  ACC: [id number] EXAM:  CT BRAIN                          PROCEDURE DATE:  08/04/2022          INTERPRETATION:  CLINICAL INDICATION:  Headache.    TECHNIQUE : Axial CT scanning of the brain was obtained from the skull   base to the vertex without the administration of intravenous contrast.   Coronal and sagittal reformatted images were subsequently obtained.  Patient was uncooperative. Repeat imaging was performed.    COMPARISON: 2/26/2016    FINDINGS:  Motion limited exam despite 3 attempts.    There is no grossly obvious evidence of mass effect, midline shift, acute   intracranial hemorrhage, or extra-axial collections.    The ventricles, cisterns and sulci are prominent, consistent with   generalized volume loss. Atrophy of the caudate heads are unchanged.   There are moderate patchy areas of hypodensity in the periventricular and   subcortical white matter which are likely related to chronic   microangiopathic changes.    The calvarium is intact. The paranasal sinuses are clear. The mastoid air   cells are predominantly clear. The orbits are unremarkable.      IMPRESSION:  Motion limited exam. If clinically indicated, repeat imaging may be   obtained.    No grossly obvious acute intracranial hemorrhage or acute territorial   infarction. Diffuse volume loss.    < end of copied text >  < from: Xray Chest 1 View AP/PA (08.04.22 @ 19:59) >  ACC: [id number] EXAM:  XR CHEST AP OR PA 1V                          PROCEDURE DATE:  08/04/2022          INTERPRETATION:  EXAMINATION: XR CHEST    CLINICAL INDICATION: Fever    TECHNIQUE: Single frontal portable view of the chest from 8/4/2022 7:59 PM    COMPARISON: None.    FINDINGS:    The heart size is normal.  The lungs are clear. Low lung volumes.  There is no pneumothorax or pleural effusion.  No pneumoperitoneum.    IMPRESSION:  Clear lungs.    < end of copied text >  < from: CT Abdomen and Pelvis w/ IV Cont (08.04.22 @ 19:14) >  ACC: [id number] EXAM:  CT ABDOMEN AND PELVIS IC                          PROCEDURE DATE:  08/04/2022          INTERPRETATION:  CLINICAL INFORMATION: Sacral wound; concerns for   osteomyelitis.    COMPARISON: No similar prior studies available for comparison.    CONTRAST/COMPLICATIONS:  IV Contrast: Omnipaque 350  70 cc administered   0 cc discarded  Oral Contrast: NONE  Complications: None reported at time of study completion    PROCEDURE:  CT of the Abdomen and Pelvis was performed.  Sagittal and coronal reformats were performed.    FINDINGS:  LOWER CHEST: Within normal limits.    LIVER: Within normal limits.  BILE DUCTS: Normal caliber.  GALLBLADDER: Nondilated gallbladder. No pericholecystic fluid nor   appreciable gallbladder wall thickening.  SPLEEN: Within normal limits.  PANCREAS: No peripancreatic fat stranding nor pancreatic edema.  ADRENALS: Within normal limits.  KIDNEYS/URETERS: Symmetric enhancement bilaterally. No hydronephrosis.    BLADDER: Indwelling Sanchez. Visible air within the bladder; likely   secondary to recent instrumentation. No arnaud appreciable bladder   abnormality.  REPRODUCTIVE ORGANS: Globular fibroid uterus with varying degrees of   calcification.    BOWEL: Profound stool burden with severe rectal distention and rectal   wall thickening and adjacent fat stranding. Extensive fecalization of the   small bowel with dilated large and small bowel loops containing formed   feces.  PERITONEUM: No ascites.  VESSELS: Atherosclerotic changes.  RETROPERITONEUM/LYMPH NODES: No lymphadenopathy.  ABDOMINAL WALL: Visible defect of the sacrum consistent with decubitus   ulcer (series 2-88). Tracking gas along the posterior sacral fascia and   within the left greater than right gluteal musculature; gas forming   bacterial infection cannot be excluded.  BONES: Minimal degenerative changes. No appreciable erosive changes of   the sacrum to suggest sacral osteomyelitis. Suggest further evaluation   with MRI of the sacrum    IMPRESSION:  Sacral decubitus ulcer with locules of gas tracking along the superficial   sacral fascia and into the left greater than right gluteal musculature;   gas forming bacterial infection cannot be excluded.    Suggest further evaluation for suspected sacral osteomyelitis with MRI    Profound stool burden with severe rectal distention and rectal wall   thickening/adjacent fat stranding. Extensive sacralization small bowel   with multiple dilated loops of large and small bowel containing formed   feces. Findings are consistent with partial/functional bowel obstruction   originating from the impacted rectum.    Suspicion for stercoral colitis.    < end of copied text >

## 2022-08-08 ENCOUNTER — TRANSCRIPTION ENCOUNTER (OUTPATIENT)
Age: 52
End: 2022-08-08

## 2022-08-08 VITALS
SYSTOLIC BLOOD PRESSURE: 127 MMHG | DIASTOLIC BLOOD PRESSURE: 67 MMHG | RESPIRATION RATE: 18 BRPM | OXYGEN SATURATION: 100 % | HEART RATE: 76 BPM | TEMPERATURE: 98 F

## 2022-08-08 DIAGNOSIS — R45.1 RESTLESSNESS AND AGITATION: ICD-10-CM

## 2022-08-08 LAB — SARS-COV-2 RNA SPEC QL NAA+PROBE: SIGNIFICANT CHANGE UP

## 2022-08-08 PROCEDURE — 87086 URINE CULTURE/COLONY COUNT: CPT

## 2022-08-08 PROCEDURE — 84132 ASSAY OF SERUM POTASSIUM: CPT

## 2022-08-08 PROCEDURE — 85610 PROTHROMBIN TIME: CPT

## 2022-08-08 PROCEDURE — 82947 ASSAY GLUCOSE BLOOD QUANT: CPT

## 2022-08-08 PROCEDURE — 83550 IRON BINDING TEST: CPT

## 2022-08-08 PROCEDURE — 70450 CT HEAD/BRAIN W/O DYE: CPT | Mod: MA

## 2022-08-08 PROCEDURE — 86900 BLOOD TYPING SEROLOGIC ABO: CPT

## 2022-08-08 PROCEDURE — 87641 MR-STAPH DNA AMP PROBE: CPT

## 2022-08-08 PROCEDURE — 36415 COLL VENOUS BLD VENIPUNCTURE: CPT

## 2022-08-08 PROCEDURE — 82565 ASSAY OF CREATININE: CPT

## 2022-08-08 PROCEDURE — 85027 COMPLETE CBC AUTOMATED: CPT

## 2022-08-08 PROCEDURE — 99239 HOSP IP/OBS DSCHRG MGMT >30: CPT

## 2022-08-08 PROCEDURE — 97162 PT EVAL MOD COMPLEX 30 MIN: CPT

## 2022-08-08 PROCEDURE — 96374 THER/PROPH/DIAG INJ IV PUSH: CPT

## 2022-08-08 PROCEDURE — 80202 ASSAY OF VANCOMYCIN: CPT

## 2022-08-08 PROCEDURE — 83540 ASSAY OF IRON: CPT

## 2022-08-08 PROCEDURE — 82728 ASSAY OF FERRITIN: CPT

## 2022-08-08 PROCEDURE — 84295 ASSAY OF SERUM SODIUM: CPT

## 2022-08-08 PROCEDURE — 80053 COMPREHEN METABOLIC PANEL: CPT

## 2022-08-08 PROCEDURE — 84145 PROCALCITONIN (PCT): CPT

## 2022-08-08 PROCEDURE — 86140 C-REACTIVE PROTEIN: CPT

## 2022-08-08 PROCEDURE — 85014 HEMATOCRIT: CPT

## 2022-08-08 PROCEDURE — 85018 HEMOGLOBIN: CPT

## 2022-08-08 PROCEDURE — 71045 X-RAY EXAM CHEST 1 VIEW: CPT

## 2022-08-08 PROCEDURE — 82746 ASSAY OF FOLIC ACID SERUM: CPT

## 2022-08-08 PROCEDURE — 82607 VITAMIN B-12: CPT

## 2022-08-08 PROCEDURE — 99285 EMERGENCY DEPT VISIT HI MDM: CPT | Mod: 25

## 2022-08-08 PROCEDURE — 82435 ASSAY OF BLOOD CHLORIDE: CPT

## 2022-08-08 PROCEDURE — U0005: CPT

## 2022-08-08 PROCEDURE — 85652 RBC SED RATE AUTOMATED: CPT

## 2022-08-08 PROCEDURE — 85730 THROMBOPLASTIN TIME PARTIAL: CPT

## 2022-08-08 PROCEDURE — 83605 ASSAY OF LACTIC ACID: CPT

## 2022-08-08 PROCEDURE — 86850 RBC ANTIBODY SCREEN: CPT

## 2022-08-08 PROCEDURE — 97606 NEG PRS WND THER DME>50 SQCM: CPT

## 2022-08-08 PROCEDURE — 87637 SARSCOV2&INF A&B&RSV AMP PRB: CPT

## 2022-08-08 PROCEDURE — 74177 CT ABD & PELVIS W/CONTRAST: CPT | Mod: MA

## 2022-08-08 PROCEDURE — 85025 COMPLETE CBC W/AUTO DIFF WBC: CPT

## 2022-08-08 PROCEDURE — 82330 ASSAY OF CALCIUM: CPT

## 2022-08-08 PROCEDURE — U0003: CPT

## 2022-08-08 PROCEDURE — 87640 STAPH A DNA AMP PROBE: CPT

## 2022-08-08 PROCEDURE — 86901 BLOOD TYPING SEROLOGIC RH(D): CPT

## 2022-08-08 PROCEDURE — 80048 BASIC METABOLIC PNL TOTAL CA: CPT

## 2022-08-08 PROCEDURE — 85576 BLOOD PLATELET AGGREGATION: CPT

## 2022-08-08 PROCEDURE — 81001 URINALYSIS AUTO W/SCOPE: CPT

## 2022-08-08 PROCEDURE — 82803 BLOOD GASES ANY COMBINATION: CPT

## 2022-08-08 PROCEDURE — 96375 TX/PRO/DX INJ NEW DRUG ADDON: CPT

## 2022-08-08 PROCEDURE — 87040 BLOOD CULTURE FOR BACTERIA: CPT

## 2022-08-08 RX ORDER — FERROUS SULFATE 325(65) MG
1 TABLET ORAL
Qty: 0 | Refills: 0 | DISCHARGE
Start: 2022-08-08

## 2022-08-08 RX ORDER — SENNA PLUS 8.6 MG/1
2 TABLET ORAL
Qty: 0 | Refills: 0 | DISCHARGE
Start: 2022-08-08

## 2022-08-08 RX ORDER — POLYETHYLENE GLYCOL 3350 17 G/17G
17 POWDER, FOR SOLUTION ORAL
Qty: 0 | Refills: 0 | DISCHARGE
Start: 2022-08-08

## 2022-08-08 RX ORDER — QUETIAPINE FUMARATE 200 MG/1
1 TABLET, FILM COATED ORAL
Qty: 24 | Refills: 0
Start: 2022-08-08 | End: 2022-08-13

## 2022-08-08 RX ORDER — FERROUS SULFATE 325(65) MG
325 TABLET ORAL DAILY
Refills: 0 | Status: DISCONTINUED | OUTPATIENT
Start: 2022-08-08 | End: 2022-08-08

## 2022-08-08 RX ORDER — ACETAMINOPHEN 500 MG
2 TABLET ORAL
Qty: 180 | Refills: 0
Start: 2022-08-08 | End: 2022-09-06

## 2022-08-08 RX ORDER — LEVETIRACETAM 250 MG/1
1 TABLET, FILM COATED ORAL
Qty: 60 | Refills: 0
Start: 2022-08-08 | End: 2022-09-06

## 2022-08-08 RX ORDER — COLLAGENASE CLOSTRIDIUM HIST. 250 UNIT/G
0 OINTMENT (GRAM) TOPICAL
Qty: 0 | Refills: 0 | DISCHARGE

## 2022-08-08 RX ORDER — CEFTRIAXONE 500 MG/1
2 INJECTION, POWDER, FOR SOLUTION INTRAMUSCULAR; INTRAVENOUS
Qty: 0 | Refills: 0 | DISCHARGE

## 2022-08-08 RX ORDER — ACETAMINOPHEN 500 MG
2 TABLET ORAL
Qty: 0 | Refills: 0 | DISCHARGE

## 2022-08-08 RX ADMIN — Medication 1 TABLET(S): at 12:31

## 2022-08-08 RX ADMIN — LEVETIRACETAM 400 MILLIGRAM(S): 250 TABLET, FILM COATED ORAL at 10:24

## 2022-08-08 RX ADMIN — Medication 325 MILLIGRAM(S): at 13:37

## 2022-08-08 RX ADMIN — CHLORHEXIDINE GLUCONATE 1 APPLICATION(S): 213 SOLUTION TOPICAL at 12:30

## 2022-08-08 RX ADMIN — AMLODIPINE BESYLATE 5 MILLIGRAM(S): 2.5 TABLET ORAL at 06:07

## 2022-08-08 RX ADMIN — POLYETHYLENE GLYCOL 3350 17 GRAM(S): 17 POWDER, FOR SOLUTION ORAL at 06:07

## 2022-08-08 RX ADMIN — Medication 150 MILLIGRAM(S): at 12:32

## 2022-08-08 RX ADMIN — Medication 500 MILLIGRAM(S): at 12:31

## 2022-08-08 RX ADMIN — SENNA PLUS 2 TABLET(S): 8.6 TABLET ORAL at 06:13

## 2022-08-08 NOTE — PROGRESS NOTE ADULT - PROBLEM SELECTOR PLAN 3
- small right SDH noted at OSH, stable on repeat imaging  - NSX evaluated pt on admission, per my d/w resident - no need for repeat imaging of other intervention   - c/w Keppra 500 BID  - SCD's for dvt ppx
New  Small right SDH  Neurosurgery consulted: Hold AC/AP, check platelets and coags  - Patient likely being admitted to medicine  - Repeat CTH now (4hrs from initial) - repeat complete  - If repeat CT stable, no neurosurgical needs. F/u with Dr. River in 1-2 weeks as outpatient   - Keppra 500 BID  -SCD's for dvt ppx.
New  Small right SDH  Neurosurgery consulted: Hold AC/AP, check platelets and coags  - Patient likely being admitted to medicine  - Repeat CTH now (4hrs from initial) - repeat complete  - If repeat CT stable, no neurosurgical needs. F/u with Dr. River in 1-2 weeks as outpatient   - Keppra 500 BID  -SCD's for dvt ppx.

## 2022-08-08 NOTE — PROGRESS NOTE ADULT - NUTRITIONAL ASSESSMENT
This patient has been assessed with a concern for Malnutrition and has been determined to have a diagnosis/diagnoses of Underweight (BMI < 19).    This patient is being managed with:   Diet Regular-  DASH/TLC {Sodium & Cholesterol Restricted} (DASH)  Anthony(7 Gm Arginine/7 Gm Glut/1.2 Gm HMB     Qty per Day:  2  Entered: Aug  6 2022  2:15PM    

## 2022-08-08 NOTE — DISCHARGE NOTE PROVIDER - HOSPITAL COURSE
A/P:62 yr old female with a pmh of Huntingtons's, dementia, HTN, recurrent UTIs with indwelling sanchez, no AC/AP who presents as a transfer from [facility] after CT there showed a left frontal contusion with concern for traumatic SDH following a fall    Repeat head CT No grossly obvious acute intracranial hemorrhage or acute territorial   infarction. Diffuse volume loss.    General Surgery consulted for r/o necrotizing fasciitis in the left buttock area where pt has a stage IV decubitus ulcer. No concern for nec fasc on physical exam with no evidence of crepitus. L sided decubitus ulcer open which can be source of air on imaging. LRINEC score of 6.     general surgery Profound stool burden with severe rectal distention and rectal wall   thickening/adjacent fat stranding. Extensive sacralization small bowel   with multiple dilated loops of large and small bowel containing formed   feces. Findings are consistent with partial/functional bowel obstruction   originating from the impacted rectum.    Suspicion for stercoral colitis.  - stool disimpaction on rectal exam at bedside   - no surgical intervention required, no evidence of nec fasc     wound care Vac PT WC consult received for VAC dressing application to sacral st 4 PU, elli session well, wound received C/D/I, measured: 5.0cmx11.0cmx1.5cm, +U 10:00 to 2:00 and 4:00 to 8:00, with greatest depth of 3.8cm at 6:00, no erythema, no purulent, no malodor, cleansed with NS, cavilon to periwound, adaptic touch to cover the bone, filled with black foam, tracked to Rt ante hip with good seal reached at 125mmHg continuous,    Per ID sacral wound is clean and to discontinue antibiotics.

## 2022-08-08 NOTE — PROGRESS NOTE ADULT - PROBLEM SELECTOR PLAN 5
significant fecal impaction noted on imaging with stercoral colitis (likely cause of pts low grade fever per ID)  - s/p disimpaction by surgery  - c/w bowel regimen
Chronic stable  /94  Pharmacy emailed for medrec - f/u
Chronic stable  /94  Pharmacy emailed for medrec - f/u

## 2022-08-08 NOTE — DISCHARGE NOTE PROVIDER - DETAILS OF MALNUTRITION DIAGNOSIS/DIAGNOSES
This patient has been assessed with a concern for Malnutrition and was treated during this hospitalization for the following Nutrition diagnosis/diagnoses:     -  08/06/2022: Underweight (BMI < 19)

## 2022-08-08 NOTE — DISCHARGE NOTE PROVIDER - NSDCCPCAREPLAN_GEN_ALL_CORE_FT
PRINCIPAL DISCHARGE DIAGNOSIS  Diagnosis: Subdural hematoma  Assessment and Plan of Treatment: pt with known seizure disorder  started on keppra, will continue  CT head stable      SECONDARY DISCHARGE DIAGNOSES  Diagnosis: Fecal impaction  Assessment and Plan of Treatment: manually disimpacted  continue lactulose, miralax and senna  monitor  BM's    Diagnosis: Sacral decubitus ulcer  Assessment and Plan of Treatment: wound vac applies  care as per routine  frequent turning and positioning  optimize nutrition

## 2022-08-08 NOTE — PROGRESS NOTE ADULT - PROBLEM SELECTOR PLAN 6
Chronic stable  Pharmacy emailed for medrec - f/u
Chronic stable  BP stable   c/w home medications
Chronic stable  Pharmacy emailed for medrec - f/u    7. -Discussed with RN. -Discussed with Dr. Sheriff; will stop abx. -?DCP soon?

## 2022-08-08 NOTE — DISCHARGE NOTE PROVIDER - PROVIDER TOKENS
FREE:[LAST:[W[facility] care [facility]],PHONE:[phone]],FAX:[(   )    -],ADDRESS:[[address] [place] [address]],FOLLOWUP:[2 weeks]],FREE:[LAST:[PMD],PHONE:[(   )    -],FAX:[(   )    -],ADDRESS:[at the ]]

## 2022-08-08 NOTE — DISCHARGE NOTE PROVIDER - NSDCACTIVITY_GEN_ALL_CORE
Visit Information Date & Time Provider Department Dept. Phone Encounter #  
 7/5/2017 11:10 AM Tabby Brennan, [address] [facility] Surgery [phone] [id number] Your Appointments 7/12/2017 11:15 AM  
ROUTINE CARE with Hubert Delgado MD  
[address] [facility] [address] [facility]) Appt Note: medication recheck [address] [facility],[place] [address]  
[phone]  
  
   
 [address] [facility] [address] [place] [address]  
  
    
 7/13/2017 12:00 PM  
NUCLEAR MEDICINE with NUCLEAR, STFRANCIS  
[facility] ([facility] SCHEDULING) Appt Note: 1day toby scan stress only for CAD ht5'5\" wt-160; toby scan stress ONLY for CAD ht5'5\" wt-160  
 [address] [facility] [address]  
[address] [facility] [address]  
  
    
 6/29/2018  1:00 PM  
ESTABLISHED PATIENT with Jeanie Galicia MD  
[address] ([address]) Appt Note: 1yr f/u  
 [address] [facility] [address]  
[address] [facility] [address] [address] Upcoming Health Maintenance Date Due  
 BREAST CANCER SCRN MAMMOGRAM 7/14/2017 INFLUENZA AGE 9 TO ADULT 8/1/2017 HEMOGLOBIN A1C Q6M 8/20/2017 FOOT EXAM Q1 8/23/2017 MICROALBUMIN Q1 8/23/2017 LIPID PANEL Q1 8/23/2017 EYE EXAM RETINAL OR DILATED Q1 8/24/2017 PAP AKA CERVICAL CYTOLOGY 8/23/2019 COLONOSCOPY 6/23/2020 DTaP/Tdap/Td series (2 - Td) 8/14/2022 Allergies as of 7/5/2017  Review Complete On: 7/5/2017 By: Viviana Jacobs LPN Severity Noted Reaction Type Reactions Accupril [Quinapril]  07/07/2010    Cough Lipitor [Atorvastatin]  07/07/2010    Other (comments)  
 aches Norvasc [Amlodipine]  07/22/2015    Swelling On legs at 10 mg dose Current Immunizations  Reviewed on 1/26/2017 Name Date Influenza Vaccine 9/1/2015 Pneumococcal Polysaccharide (PPSV-23) 8/4/2015 TD Vaccine 11/7/2005 TDAP Vaccine 8/14/2012 Not reviewed this visit Vitals BP Pulse Temp Resp Height(growth percentile) Weight(growth percentile) 116/71 (BP 1 Location: Left arm, BP Patient Position: Sitting) 75 98 °F (36.7 °C) (Oral) 14 5' 5\" (1.651 m) 155 lb 8 oz (70.5 kg) LMP SpO2 BMI OB Status Smoking Status 01/01/1985 98% 25.88 kg/m2 Hysterectomy Never Smoker Vitals History BMI and BSA Data Body Mass Index Body Surface Area  
 25.88 kg/m 2 1.8 m 2 Preferred Pharmacy Pharmacy Name Phone [id number] [facility] [place] [phone] Your Updated Medication List  
  
   
This list is accurate as of: 7/5/17 11:35 AM.  Always use your most recent med list.  
  
  
  
  
 * albuterol 0.63 mg/3 mL nebulizer solution Commonly known as:  ACCUNEB  
3 mL by Nebulization route every four (4) hours as needed for Wheezing or Shortness of Breath. * albuterol 90 mcg/actuation inhaler Commonly known as:  PROAIR HFA Take 1 Puff by inhalation every four (4) hours as needed for Wheezing or Shortness of Breath. alcohol swabs Padm Commonly known as:  ALCOHOL PADS Use when checking blood glucose ALPRAZolam 0.5 mg tablet Commonly known as:  XANAX  
TAKE 1 TABLET BY MOUTH 3 TIMES A DAY AS NEEDED FOR ANXIETY. MAX DAILY AMT 1.5MG aspirin delayed-release 81 mg tablet Take  by mouth daily. Blood-Glucose Meter monitoring kit Use to check glucose once a day  
  
 budesonide 180 mcg/actuation Aepb inhaler Commonly known as:  PULMICORT Take 1 Puff by inhalation two (2) times a day. Indications: MAINTENANCE THERAPY FOR ASTHMA  
  
 cyclobenzaprine 10 mg tablet Commonly known as:  FLEXERIL Take  by mouth three (3) times daily as needed for Muscle Spasm(s). dicyclomine 10 mg capsule Commonly known as:  BENTYL TAKE 1 (ONE) CAPSULE ORALLY AS NEEDED EVERY 6 HOURS FLUoxetine 20 mg capsule Commonly known as:  PROzac TAKE ONE CAPSULE BY MOUTH EVERY DAY  
  
 furosemide 20 mg tablet Commonly known as:  LASIX Take 1 Tab by mouth as needed. gabapentin 600 mg tablet Commonly known as:  NEURONTIN  
TAKE 1 TABLET BY MOUTH THREE (3) TIMES DAILY. glucose blood VI test strips strip Commonly known as:  blood glucose test  
Use once a day Lancets Misc Use once a day. Please give one compatible with patient's meter  
  
 metoprolol tartrate 25 mg tablet Commonly known as:  LOPRESSOR Take 1 Tab by mouth two (2) times a day. nitroglycerin 0.4 mg SL tablet Commonly known as:  NITROSTAT  
DISSOLVE 1 TABLET IN MOUTH EVERY 5 MINUTES AS NEEDED FOR CHEST PAIN  
  
 omeprazole 20 mg capsule Commonly known as:  PRILOSEC  
TAKE ONE CAPSULE BY MOUTH EVERY DAY  
  
 ondansetron 4 mg disintegrating tablet Commonly known as:  ZOFRAN ODT Take 1 Tab by mouth every eight (8) hours as needed for Nausea. rosuvastatin 40 mg tablet Commonly known as:  CRESTOR  
TAKE 1 TABLET BY MOUTH NIGHTLY  
  
 valACYclovir 500 mg tablet Commonly known as:  VALTREX Take 1 Tab by mouth two (2) times a day. ZyrTEC 10 mg tablet Generic drug:  cetirizine Take  by mouth daily. * Notice: This list has 2 medication(s) that are the same as other medications prescribed for you. Read the directions carefully, and ask your doctor or other care provider to review them with you. Patient Instructions Eating Healthy Foods: Care Instructions Your Care Instructions Eating healthy foods can help lower your risk for disease. Healthy food gives you energy and keeps your heart strong, your brain active, your muscles working, and your bones strong. A healthy diet includes a variety of foods from the basic food groups: grains, vegetables, fruits, milk and milk products, and meat and beans. Some people may eat more of their favorite foods from only one food group and, as a result, miss getting the nutrients they need. So, it is important to pay attention not only to what you eat but also to what you are missing from your diet. You can eat a healthy, balanced diet by making a few small changes. Follow-up care is a key part of your treatment and safety. Be sure to make and go to all appointments, and call your doctor if you are having problems. It's also a good idea to know your test results and keep a list of the medicines you take. How can you care for yourself at home? Look at what you eat · Keep a food diary for a week or two and record everything you eat or drink. Track the number of servings you eat from each food group. · For a balanced diet every day, eat a variety of: ¨ 6 or more ounce-equivalents of grains, such as cereals, breads, crackers, rice, or pasta, every day. An ounce-equivalent is 1 slice of bread, 1 cup of ready-to-eat cereal, or ½ cup of cooked rice, cooked pasta, or cooked cereal. 
¨ 2½ cups of vegetables, especially: § Dark-green vegetables such as broccoli and spinach. § Orange vegetables such as carrots and sweet potatoes. § Dry beans (such as marsh and kidney beans) and peas (such as lentils). ¨ 2 cups of fresh, frozen, or canned fruit. A small apple or 1 banana or orange equals 1 cup. ¨ 3 cups of nonfat or low-fat milk, yogurt, or other milk products. ¨ 5½ ounces of meat and beans, such as chicken, fish, lean meat, beans, nuts, and seeds. One egg, 1 tablespoon of peanut butter, ½ ounce nuts or seeds, or ¼ cup of cooked beans equals 1 ounce of meat. · Learn how to read food labels for serving sizes and ingredients. Fast-food and convenience-food meals often contain few or no fruits or vegetables. Make sure you eat some fruits and vegetables to make the meal more nutritious. · Look at your food diary.  For each food group, add up what you have eaten and then divide the total by the number of days. This will give you an idea of how much you are eating from each food group. See if you can find some ways to change your diet to make it more healthy. Start small · Do not try to make dramatic changes to your diet all at once. You might feel that you are missing out on your favorite foods and then be more likely to fail. · Start slowly, and gradually change your habits. Try some of the following: ¨ Use whole wheat bread instead of white bread. ¨ Use nonfat or low-fat milk instead of whole milk. ¨ Eat brown rice instead of white rice, and eat whole wheat pasta instead of white-flour pasta. ¨ Try low-fat cheeses and low-fat yogurt. ¨ Add more fruits and vegetables to meals and have them for snacks. ¨ Add lettuce, tomato, cucumber, and onion to sandwiches. ¨ Add fruit to yogurt and cereal. 
Enjoy food · You can still eat your favorite foods. You just may need to eat less of them. If your favorite foods are high in fat, salt, and sugar, limit how often you eat them, but do not cut them out entirely. · Eat a wide variety of foods. Make healthy choices when eating out · The type of restaurant you choose can help you make healthy choices. Even fast-food chains are now offering more low-fat or healthier choices on the menu. · Choose smaller portions, or take half of your meal home. · When eating out, try: ¨ A veggie pizza with a whole wheat crust or grilled chicken (instead of sausage or pepperoni). ¨ Pasta with roasted vegetables, grilled chicken, or marinara sauce instead of cream sauce. ¨ A vegetable wrap or grilled chicken wrap. ¨ Broiled or poached food instead of fried or breaded items. Make healthy choices easy · Buy packaged, prewashed, ready-to-eat fresh vegetables and fruits, such as baby carrots, salad mixes, and chopped or shredded broccoli and cauliflower. · Buy packaged, presliced fruits, such as melon or pineapple. · Choose 100% fruit or vegetable juice instead of soda. Limit juice intake to 4 to 6 oz (½ to ¾ cup) a day. · Blend low-fat yogurt, fruit juice, and canned or frozen fruit to make a smoothie for breakfast or a snack. Where can you learn more? Go to http://maliha-sanchez.info/. Enter T756 in the search box to learn more about \"Eating Healthy Foods: Care Instructions. \" Current as of: April 3, 2017 Content Version: 11.3 © 5771-8263 [employer]. Care instructions adapted under license by [employer] (which disclaims liability or warranty for this information). If you have questions about a medical condition or this instruction, always ask your healthcare professional. [address] any warranty or liability for your use of this information. Please check B12 with lab work Introducing [facility]! Dear [facility]: 
Thank you for requesting a Layer account. Our records indicate that you already have an active Layer account. You can access your account anytime at [email or web address] Did you know that you can access your hospital and ER discharge instructions at any time in Layer? You can also review all of your test results from your hospital stay or ER visit. Additional Information If you have questions, please visit the Frequently Asked Questions section of the Layer website at [email or web address] Remember, Layer is NOT to be used for urgent needs. For medical emergencies, dial 911. Now available from your iPhone and Android! Please provide this summary of care documentation to your next provider. Your primary care clinician is listed as Анна Matt. If you have any questions after today's visit, please call [phone]. No heavy lifting/straining

## 2022-08-08 NOTE — PROGRESS NOTE ADULT - PROBLEM SELECTOR PLAN 2
pt was being treated for UTI at NH (ceftriaxone, started 8/3) - has now completed 5 day course of abx total  - off abx now; UCx NGTD
Active treatment  Reports per chart review pt actively being treated for UTI  Lugo changed on admission  Abc as above   collateral from rehab pending  -s/p short course of IVF's.
Active treatment  Reports per chart review pt actively being treated for UTI  Lugo changed on admission  Abc as above   collateral from rehab pending  -short course of IVF's.

## 2022-08-08 NOTE — PROGRESS NOTE ADULT - ASSESSMENT
62F hx Huntingtons's, dementia, HTN, recurrent UTIs with indwelling sanchez, no AC/AP transfer from [facility] s/p unwitnessed fall. CTH with small R subacute SDH. She has a UTI with low grade sepsis and grade IV sacral ulcer (febrile 100.8). EXAM:  nonverbal, no EO, PERRL, unable to assess EOM, not FC, contracted RU at wrist, contracted JOE at elbow, contracted L foot, minimal to no movement x4. Baseline exam 2/2 Barnes’s    - Hold AC/AP, check platelets and coags  - Patient likely being admitted to medicine  - Repeat CTH now (4hrs from initial)   - If repeat CT stable, no neurosurgical needs. F/u with Dr. River in 1-2 weeks as outpatient   - Keppra 500 BID  
62 yr old female with a pmh of Huntingtons's, dementia, HTN, recurrent UTIs with indwelling sanchez, no AC/AP who presents as a transfer from [facility] after CT there showed a left frontal contusion with concern for traumatic SDH following a fall. ID consulted for fever in the setting of extensive sacral wound.  Recurrent UTIs with indwelling sanchez    #Fever, Leukocytosis, Sacral Wound  Sacral wound looks fairly clean, doubt superinfection of wound  On presentation to [facility] was febrile  Was being treated for UTI at NH prior to presentation, sanchez changed at admission  --S/P Clindamycin and Vancomycin  --MRI of sacrum not recommended as patient not a candidate for definitive treatment. which would entail long term antibiotics and wound offloading.  Without aggressive management such flap placement over the sacral wound would not achieve cure even with extended therapy.  -- likely source of fever was stercoral colitis, S/P disimpaction, no perforation/abscess on CT    --blood cultures / urine cultures are negative would discontinue Zosyn    D/w Primary team    Over the weekend please call with questions [phone]  Alexandru Noble MD  pager [phone]  office [phone]  
52F with PMH Traverse's, dementia, HTN, recurrent UTIs with indwelling sanchez who presents as a transfer for traumatic R frontal SDH 2/2 mechanical fall, noted at OSH to have low grade fever and large stage IV decubitus ulcer. 
52 yr old female presenting with  sacral decubitus ulcer
52 yr old female presenting with  sacral decubitus ulcer

## 2022-08-08 NOTE — PROGRESS NOTE ADULT - PROBLEM SELECTOR PLAN 1
New  Imaging as above  Tmaz 100.8 at [facility]  s/p vanc, zosyn, clindamycin - ID recs now stopping zosyn.  -f/u cultures - ngtd.   -f/u inflammatory markers.   Surgery consulted no intervention  Wound care consult f/u
large stage IV decubitus ulcer noted on exam, initially c/f possible infectious source, was on vanco/clinda/zosyn  - surgery evaluated patient, low c/f infected wound   - ID following - do not think wound is acutely infected  - pt now off all abx  - wound care following, wound vac in place
New  Imaging as above  Tmaz 100.8 at [facility]  Continue vanc, zosyn, clindamycin - ID recs zosyn only now.   -f/u cultures.   -f/u inflammatory markers.   Surgery consulted no intervention  Wound care consult

## 2022-08-08 NOTE — DISCHARGE NOTE NURSING/CASE MANAGEMENT/SOCIAL WORK - NSDCPNINST_GEN_ALL_CORE
call md for any discomforts felt-- sacral decubitus ulcer--connect to wound vac-- sanchez catheter care-- keep skin clean and dry always--turn and change position  call md for any discomforts felt-- sacral decubitus ulcer--connect to wound vac-- sanchez catheter care-- keep skin clean and dry always--turn and change position frequently while in bed

## 2022-08-08 NOTE — PROGRESS NOTE ADULT - SUBJECTIVE AND OBJECTIVE BOX
[facility] Division of Hospital Medicine  Kumar Newton MD  Available via MS Teams      SUBJECTIVE / OVERNIGHT EVENTS:  No acute events. Unable to obtain ROS 2/2 baseline dementia.    ADDITIONAL REVIEW OF SYSTEMS:  Unable to obtain as above     MEDICATIONS  (STANDING):  amLODIPine   Tablet 5 milliGRAM(s) Oral daily  ascorbic acid 500 milliGRAM(s) Oral daily  chlorhexidine 4% Liquid 1 Application(s) Topical daily  lactated ringers. 1000 milliLiter(s) (100 mL/Hr) IV Continuous <Continuous>  levETIRAcetam  IVPB 500 milliGRAM(s) IV Intermittent every 12 hours  melatonin 5 milliGRAM(s) Oral at bedtime  multivitamin 1 Tablet(s) Oral daily  polyethylene glycol 3350 17 Gram(s) Oral two times a day  senna 2 Tablet(s) Oral two times a day  traZODone 150 milliGRAM(s) Oral daily    MEDICATIONS  (PRN):  acetaminophen     Tablet .. 650 milliGRAM(s) Oral every 6 hours PRN Temp greater or equal to 38C (100.4F), Mild Pain (1 - 3), Moderate Pain (4 - 6)  aluminum hydroxide/magnesium hydroxide/simethicone Suspension 30 milliLiter(s) Oral every 4 hours PRN Dyspepsia  lactulose Syrup 20 Gram(s) Oral two times a day PRN constipation  OLANZapine Injectable 2.5 milliGRAM(s) IntraMuscular every 8 hours PRN agitation  ondansetron Injectable 4 milliGRAM(s) IV Push every 8 hours PRN Nausea and/or Vomiting  QUEtiapine 25 milliGRAM(s) Oral every 6 hours PRN agitation      I&O's Summary    07 Aug 2022 07:01  -  08 Aug 2022 07:00  --------------------------------------------------------  IN: 220 mL / OUT: 800 mL / NET: -580 mL    08 Aug 2022 07:01  -  08 Aug 2022 12:18  --------------------------------------------------------  IN: 100 mL / OUT: 0 mL / NET: 100 mL        PHYSICAL EXAM:  Vital Signs Last 24 Hrs  T(C): 36.8 (08 Aug 2022 11:32), Max: 36.8 (08 Aug 2022 11:32)  T(F): 98.3 (08 Aug 2022 11:32), Max: 98.3 (08 Aug 2022 11:32)  HR: 88 (08 Aug 2022 11:32) (61 - 88)  BP: 119/71 (08 Aug 2022 11:32) (119/71 - 134/83)  BP(mean): --  RR: 17 (08 Aug 2022 11:32) (17 - 18)  SpO2: 100% (08 Aug 2022 11:32) (100% - 100%)    Parameters below as of 08 Aug 2022 11:32  Patient On (Oxygen Delivery Method): room air      PHYSICAL EXAM:  GENERAL: NAD, contracted and cachectic   HEAD:  Atraumatic, normocephalic  EYES: conjunctiva and sclera clear  NECK: Supple, no JVD  CHEST/LUNG: grossly clear to auscultation bilaterally; no wheezing or rales  HEART: Regular rate and rhythm; no murmurs, no LE edema   ABDOMEN: Soft, nondistended; bowel sounds present  EXTREMITIES:  2+ Peripheral Pulses, no clubbing, cyanosis; contracted LEs and contracted RUE  PSYCH: unable to assess   NEUROLOGY: contracted, opens eye to name and stimulation, unable to assess further   SKIN: RUE PICC, wound vac in pace     LABS:                        RADIOLOGY & ADDITIONAL TESTS:  New Imaging Personally Reviewed Today:  New Electrocardiogram Personally Reviewed Today:  Other Results Reviewed Today:   Prior or Outpatient Records Reviewed Today with Summary:    COORDINATION OF CARE:  Consultant Communication and Details of Discussion (where applicable):

## 2022-08-08 NOTE — DISCHARGE NOTE PROVIDER - NSDCMRMEDTOKEN_GEN_ALL_CORE_FT
aluminum hydroxide-magnesium hydroxide 200 mg-200 mg/5 mL oral suspension: 30 milliliter(s) orally every 4 hours, As needed, Dyspepsia  amLODIPine 5 mg oral tablet: 1 tab(s) orally once a day  ascorbic acid 500 mg oral tablet: 1 tab(s) orally once a day  Daily Chuy oral tablet: 1 tab(s) orally once a day  lactulose 10 g/15 mL oral solution: 15 milliliter(s) orally 2 times a day, As Needed  Melatonin 3 mg oral tablet: 1 tab(s) orally once a day (at bedtime), As Needed  Milk of Magnesia 8% oral suspension: 30 milliliter(s) orally once a day (at bedtime), As Needed  polyethylene glycol 3350 oral powder for reconstitution: 17 gram(s) orally 2 times a day  Probiotic Formula: 1 tab(s) orally 2 times a day  senna leaf extract oral tablet: 2 tab(s) orally 2 times a day  traZODone 150 mg oral tablet: 1 tab(s) orally once a day  Tylenol 325 mg oral tablet: 2 tab(s) orally every shift for pain associated with sound care 30-60 min prior to wound care  zinc sulfate 220 mg oral capsule: 1 cap(s) orally once a day   aluminum hydroxide-magnesium hydroxide 200 mg-200 mg/5 mL oral suspension: 30 milliliter(s) orally every 4 hours, As needed, Dyspepsia  amLODIPine 5 mg oral tablet: 1 tab(s) orally once a day  ascorbic acid 500 mg oral tablet: 1 tab(s) orally once a day  Daily Chuy oral tablet: 1 tab(s) orally once a day  ferrous sulfate 325 mg (65 mg elemental iron) oral tablet: 1 tab(s) orally once a day  lactulose 10 g/15 mL oral solution: 15 milliliter(s) orally 2 times a day, As Needed  Melatonin 3 mg oral tablet: 1 tab(s) orally once a day (at bedtime), As Needed  Milk of Magnesia 8% oral suspension: 30 milliliter(s) orally once a day (at bedtime), As Needed  polyethylene glycol 3350 oral powder for reconstitution: 17 gram(s) orally 2 times a day  Probiotic Formula: 1 tab(s) orally 2 times a day  senna leaf extract oral tablet: 2 tab(s) orally 2 times a day  traZODone 150 mg oral tablet: 1 tab(s) orally once a day  zinc sulfate 220 mg oral capsule: 1 cap(s) orally once a day   amLODIPine 5 mg oral tablet: 1 tab(s) orally once a day  ascorbic acid 500 mg oral tablet: 1 tab(s) orally once a day  Daily Chuy oral tablet: 1 tab(s) orally once a day  ferrous sulfate 325 mg (65 mg elemental iron) oral tablet: 1 tab(s) orally once a day  lactulose 10 g/15 mL oral solution: 15 milliliter(s) orally 2 times a day, As Needed  Melatonin 3 mg oral tablet: 1 tab(s) orally once a day (at bedtime), As Needed  Milk of Magnesia 8% oral suspension: 30 milliliter(s) orally once a day (at bedtime), As Needed  polyethylene glycol 3350 oral powder for reconstitution: 17 gram(s) orally 2 times a day  Probiotic Formula: 1 tab(s) orally 2 times a day  senna leaf extract oral tablet: 2 tab(s) orally 2 times a day  traZODone 150 mg oral tablet: 1 tab(s) orally once a day  zinc sulfate 220 mg oral capsule: 1 cap(s) orally once a day

## 2022-08-08 NOTE — PROGRESS NOTE ADULT - REASON FOR ADMISSION
subdural hematoma/ sacral decubitus ulcer

## 2022-08-08 NOTE — DIETITIAN NUTRITION RISK NOTIFICATION - TREATMENT: THE FOLLOWING DIET HAS BEEN RECOMMENDED
Diet, DASH/TLC:   Sodium & Cholesterol Restricted  Anthony(7 Gm Arginine/7 Gm Glut/1.2 Gm HMB     Qty per Day:  2 (08-06-22 @ 13:46) [Pending Verification By Attending]  Diet, Regular:   DASH/TLC {Sodium & Cholesterol Restricted} (DASH) (08-05-22 @ 03:20) [Active]      
Diet, Regular:   DASH/TLC {Sodium & Cholesterol Restricted} (DASH)  Anthony(7 Gm Arginine/7 Gm Glut/1.2 Gm HMB     Qty per Day:  2 (08-06-22 @ 14:16) [Active]

## 2022-08-08 NOTE — CHART NOTE - NSCHARTNOTEFT_GEN_A_CORE
Nutrition Follow Up Note  Patient seen for: nutrition assessment/education    Chart reviewed, events noted. Per chart "52F with PMH Dolores's, dementia, HTN, recurrent UTIs with indwelling sanchez who presents as a transfer for traumatic R frontal SDH 2/2 mechanical fall, noted at OSH to have low grade fever and large stage IV decubitus ulcer. "    Source: [] Patient       [x] EMR        [X] communication with team   [X] Cousin (Jackelyn) via phone    -If unable to interview patient: [] Trach/Vent/BiPAP  [X] Disoriented/confused/inappropriate to interview    Diet Order:   Diet, Regular:   DASH/TLC {Sodium & Cholesterol Restricted} (DASH)  Anthony(7 Gm Arginine/7 Gm Glut/1.2 Gm HMB     Qty per Day:  2 (22)    - Is current order appropriate/adequate? [] Yes  [X]  No:     - PO intake :   [] >75%  Adequate    [] 50-75%  Fair       [X] <50%  Poor    - Nutrition-related concerns:      - Per team, Pt with overall poor appetite/PO intake since admission (x 4 days). Of note, cousin reports Pt has good appetite but requires total feeding assistance.       - Amenable to Pt receiving oral nutritional supplement to optimize PO intake: Ensure Shake 2x/day (700 marisabel, 40 Gm protein)       - Abnormal iron panel noted (), now ordered for ferrous sulfate.       - Micronutrient/Other supplementation: vitamin C, multivitamin     GI:    - No GI distress reported per team  - Last BM .  Fecal incontinence noted  - Bowel Regimen? [X] Yes (lactulose, Miralax, Senna)    Nutrition focused physical exam conducted:    Subcutaneous fat loss: [ moderate] Orbital fat pads region, [ severe]Buccal fat region  Muscle wasting: [severe ]Temples region, [severe]Clavicle region, [ moderate]Shoulder region    Weights:   Dosing Weight in k.1 ()    Nutritionally Pertinent MEDICATIONS  (STANDING):  amLODIPine   Tablet  ascorbic acid  ferrous    sulfate  lactated ringers.  multivitamin  polyethylene glycol 3350  senna    Pertinent Labs:  Na 142 mmol/L Glu 88 mg/dL K+ 3.8 mmol/L Cr 0.85 mg/dL BUN 9 mg/dL     Skin per nursing documentation: sacrum pressure injury (stage 4)  Edema: note noted per nursing documentation     Estimated Needs:   [X] no change since previous assessment  [] recalculated:     Previous Nutrition Diagnosis: Underweight  Nutrition Diagnosis is: [X] ongoing  - being addressed with PO intake, oral nutritional supplements, food preferences     New Nutrition Diagnosis: [X] Severe acute on chronic Malnutrition RT decreased ability to consume adequate protein-energy in setting of increased physiological demand for nutrients AEB <75% moderate-severe muscle fat depletion , likely meeting <75% EER x >1 month, BMI <19    Nutrition Care Plan:  [X] In Progress  [] Achieved  [] Not applicable    Nutrition Interventions:     Education Provided: to cousin (Jackelyn)      [X] Yes:  Discussed importance of adequate consumption of meals/supplements to optimize protein-energy intake and wound healing. Encouraged small/frequent meals, nutrient dense snacks, prioritizing protein foods at meal time.        Recommendations:         [X] Liberalize diet order: Regular            [X] Add oral nutrition supplement: Ensure Shake 2x/day            [X] Continue Anthony 2x/day for wound healing     [X] Provide total feeding assistance at meal time.     [X] Encourage adequate consumption of meals/supplements to optimize protein-energy intake.      [X] Continue current micronutrient supplementation: multivitamin, vitamin C, iron      [X] New malnutrition notification sent.     Monitoring and Evaluation:   Continue to monitor nutritional intake, tolerance to diet prescription, weights, labs, skin integrity      RD remains available upon request and will follow up per protocol  PARI Barboza [facility] Pager #[phone]

## 2022-08-08 NOTE — DISCHARGE NOTE PROVIDER - CARE PROVIDER_API CALL
Wound care Center,   [address]  Phone: [phone]  Fax: (   )    -  Follow Up Time: 2 weeks    PMD,   at the SNF  Phone: (   )    -  Fax: (   )    -  Follow Up Time:

## 2022-08-08 NOTE — DISCHARGE NOTE NURSING/CASE MANAGEMENT/SOCIAL WORK - NSDCPEFALRISK_GEN_ALL_CORE
For information on Fall & Injury Prevention, visit: [email or web address] OR  [email or web address] OR  https://www.cdc.gov/steadi/patient.html

## 2022-08-08 NOTE — PROGRESS NOTE ADULT - PROBLEM SELECTOR PLAN 4
pt becomes agitated, yelling, mostly in the evening - likely sundowning in setting of dementia  - c/w home dose Trazadone 150mg qhs   -  consulted, started on seroquel 25mg prn and zyprexa 2.5mg IM prn   - c/w above regimen for now
New  Imaging as above  disimpaction by surgery at bedside  bowel regimen
New  Imaging as above  disimpaction by surgery at bedside  bowel regimen

## 2022-08-08 NOTE — DISCHARGE NOTE NURSING/CASE MANAGEMENT/SOCIAL WORK - PATIENT PORTAL LINK FT
You can access the FollowMyHealth Patient Portal offered by [facility] by registering at the following website: [email or web address]. By joining [employer]’s FollowMyHealth portal, you will also be able to view your health information using other applications (apps) compatible with our system.

## 2022-08-09 LAB
CULTURE RESULTS: SIGNIFICANT CHANGE UP
CULTURE RESULTS: SIGNIFICANT CHANGE UP
SPECIMEN SOURCE: SIGNIFICANT CHANGE UP
SPECIMEN SOURCE: SIGNIFICANT CHANGE UP

## 2023-01-03 ENCOUNTER — INPATIENT (INPATIENT)
Facility: HOSPITAL | Age: 53
LOS: 29 days | Discharge: NURSING, REHAB OR LONG-TERM CARE | End: 2023-02-02
Attending: INTERNAL MEDICINE | Admitting: INTERNAL MEDICINE
Payer: MEDICAID

## 2023-01-03 VITALS
HEART RATE: 100 BPM | DIASTOLIC BLOOD PRESSURE: 84 MMHG | RESPIRATION RATE: 18 BRPM | SYSTOLIC BLOOD PRESSURE: 125 MMHG | OXYGEN SATURATION: 99 % | TEMPERATURE: 98 F

## 2023-01-03 PROCEDURE — 99285 EMERGENCY DEPT VISIT HI MDM: CPT

## 2023-01-03 NOTE — ED ADULT TRIAGE NOTE - RESPIRATORY RATE (BREATHS/MIN)
Joint Injection Procedure Note    PRE-OP DIAGNOSIS: Left knee traumatic osteoarthritis  POST-OP DIAGNOSIS: Same   PROCEDURE: Left knee joint injection  Performing Physician: Enrico De La Torre MD      Dose:          2%   Lidocaine   5 mL    Steroid 40mg/mL Triamcinolone acetonide             Prep: Betadine x3     Medication lot number and expiration date and MA notes.     Procedure:     Risks, benefits, and alternatives discussed prior to the procedure.  Informed consent was obtained (please see scanned consent forms).  The area was prepped in the usual sterile manner. The needle was inserted into the left knee joint, 0 milliliters of joint fluid was aspirated using a 22-gauge, and the steroid/lidocaine was injected using the same 22-gauge 1-1/2 inch needle, total volume of 6 cc.  There were no complications during this procedure.     Followup: The patient tolerated the procedure well without complications.  Standard post-procedure care is explained and return precautions are given       18

## 2023-01-03 NOTE — ED ADULT TRIAGE NOTE - CHIEF COMPLAINT QUOTE
Pt brought in by EMS from NH for failure to thrive and fever. Pt has a hx of [place]'s disease and dementia per EMS. pt nonverbal. Pt noted to have 20g L arm placed by EMS.

## 2023-01-04 DIAGNOSIS — E87.0 HYPEROSMOLALITY AND HYPERNATREMIA: ICD-10-CM

## 2023-01-04 PROBLEM — E78.5 HYPERLIPIDEMIA, UNSPECIFIED: Chronic | Status: ACTIVE | Noted: 2022-08-04

## 2023-01-04 PROBLEM — F03.90 UNSPECIFIED DEMENTIA WITHOUT BEHAVIORAL DISTURBANCE: Chronic | Status: ACTIVE | Noted: 2022-08-04

## 2023-01-04 PROBLEM — I10 ESSENTIAL (PRIMARY) HYPERTENSION: Chronic | Status: ACTIVE | Noted: 2022-08-04

## 2023-01-04 PROBLEM — Z86.69 PERSONAL HISTORY OF OTHER DISEASES OF THE NERVOUS SYSTEM AND SENSE ORGANS: Chronic | Status: ACTIVE | Noted: 2022-08-04

## 2023-01-04 LAB
ALBUMIN SERPL ELPH-MCNC: 3.1 G/DL — LOW (ref 3.3–5)
ALBUMIN SERPL ELPH-MCNC: 3.4 G/DL — SIGNIFICANT CHANGE UP (ref 3.3–5)
ALP SERPL-CCNC: 81 U/L — SIGNIFICANT CHANGE UP (ref 40–120)
ALP SERPL-CCNC: 89 U/L — SIGNIFICANT CHANGE UP (ref 40–120)
ALT FLD-CCNC: 12 U/L — SIGNIFICANT CHANGE UP (ref 4–33)
ALT FLD-CCNC: 15 U/L — SIGNIFICANT CHANGE UP (ref 4–33)
ANION GAP SERPL CALC-SCNC: 12 MMOL/L — SIGNIFICANT CHANGE UP (ref 7–14)
ANION GAP SERPL CALC-SCNC: 9 MMOL/L — SIGNIFICANT CHANGE UP (ref 7–14)
APPEARANCE UR: CLEAR — SIGNIFICANT CHANGE UP
APTT BLD: 27.2 SEC — SIGNIFICANT CHANGE UP (ref 27–36.3)
AST SERPL-CCNC: 12 U/L — SIGNIFICANT CHANGE UP (ref 4–32)
AST SERPL-CCNC: 18 U/L — SIGNIFICANT CHANGE UP (ref 4–32)
BACTERIA # UR AUTO: ABNORMAL
BILIRUB SERPL-MCNC: 0.3 MG/DL — SIGNIFICANT CHANGE UP (ref 0.2–1.2)
BILIRUB SERPL-MCNC: 0.4 MG/DL — SIGNIFICANT CHANGE UP (ref 0.2–1.2)
BILIRUB UR-MCNC: NEGATIVE — SIGNIFICANT CHANGE UP
BUN SERPL-MCNC: 38 MG/DL — HIGH (ref 7–23)
BUN SERPL-MCNC: 49 MG/DL — HIGH (ref 7–23)
CALCIUM SERPL-MCNC: 10 MG/DL — SIGNIFICANT CHANGE UP (ref 8.4–10.5)
CALCIUM SERPL-MCNC: 10.5 MG/DL — SIGNIFICANT CHANGE UP (ref 8.4–10.5)
CHLORIDE SERPL-SCNC: 120 MMOL/L — HIGH (ref 98–107)
CHLORIDE SERPL-SCNC: 123 MMOL/L — HIGH (ref 98–107)
CO2 SERPL-SCNC: 24 MMOL/L — SIGNIFICANT CHANGE UP (ref 22–31)
CO2 SERPL-SCNC: 26 MMOL/L — SIGNIFICANT CHANGE UP (ref 22–31)
COLOR SPEC: YELLOW — SIGNIFICANT CHANGE UP
COMMENT - URINE: SIGNIFICANT CHANGE UP
CREAT SERPL-MCNC: 0.63 MG/DL — SIGNIFICANT CHANGE UP (ref 0.5–1.3)
CREAT SERPL-MCNC: 0.67 MG/DL — SIGNIFICANT CHANGE UP (ref 0.5–1.3)
DIFF PNL FLD: ABNORMAL
EGFR: 104 ML/MIN/1.73M2 — SIGNIFICANT CHANGE UP
EGFR: 106 ML/MIN/1.73M2 — SIGNIFICANT CHANGE UP
EPI CELLS # UR: 12 /HPF — HIGH (ref 0–5)
FLUAV AG NPH QL: SIGNIFICANT CHANGE UP
FLUBV AG NPH QL: SIGNIFICANT CHANGE UP
GLUCOSE SERPL-MCNC: 108 MG/DL — HIGH (ref 70–99)
GLUCOSE SERPL-MCNC: 121 MG/DL — HIGH (ref 70–99)
GLUCOSE UR QL: NEGATIVE — SIGNIFICANT CHANGE UP
HCT VFR BLD CALC: 41.6 % — SIGNIFICANT CHANGE UP (ref 34.5–45)
HCT VFR BLD CALC: 43.5 % — SIGNIFICANT CHANGE UP (ref 34.5–45)
HGB BLD-MCNC: 12.2 G/DL — SIGNIFICANT CHANGE UP (ref 11.5–15.5)
HGB BLD-MCNC: 13.1 G/DL — SIGNIFICANT CHANGE UP (ref 11.5–15.5)
HYALINE CASTS # UR AUTO: 0 /LPF — SIGNIFICANT CHANGE UP (ref 0–7)
INR BLD: 1.22 RATIO — HIGH (ref 0.88–1.16)
KETONES UR-MCNC: NEGATIVE — SIGNIFICANT CHANGE UP
LEUKOCYTE ESTERASE UR-ACNC: NEGATIVE — SIGNIFICANT CHANGE UP
MAGNESIUM SERPL-MCNC: 2.6 MG/DL — SIGNIFICANT CHANGE UP (ref 1.6–2.6)
MCHC RBC-ENTMCNC: 27.8 PG — SIGNIFICANT CHANGE UP (ref 27–34)
MCHC RBC-ENTMCNC: 28.2 PG — SIGNIFICANT CHANGE UP (ref 27–34)
MCHC RBC-ENTMCNC: 29.3 GM/DL — LOW (ref 32–36)
MCHC RBC-ENTMCNC: 30.1 GM/DL — LOW (ref 32–36)
MCV RBC AUTO: 92.4 FL — SIGNIFICANT CHANGE UP (ref 80–100)
MCV RBC AUTO: 96.1 FL — SIGNIFICANT CHANGE UP (ref 80–100)
NITRITE UR-MCNC: NEGATIVE — SIGNIFICANT CHANGE UP
NRBC # BLD: 0 /100 WBCS — SIGNIFICANT CHANGE UP (ref 0–0)
NRBC # BLD: 0 /100 WBCS — SIGNIFICANT CHANGE UP (ref 0–0)
NRBC # FLD: 0 K/UL — SIGNIFICANT CHANGE UP (ref 0–0)
NRBC # FLD: 0 K/UL — SIGNIFICANT CHANGE UP (ref 0–0)
PH UR: 5.5 — SIGNIFICANT CHANGE UP (ref 5–8)
PHOSPHATE SERPL-MCNC: 2.2 MG/DL — LOW (ref 2.5–4.5)
PLATELET # BLD AUTO: 494 K/UL — HIGH (ref 150–400)
PLATELET # BLD AUTO: 554 K/UL — HIGH (ref 150–400)
POTASSIUM SERPL-MCNC: 3.9 MMOL/L — SIGNIFICANT CHANGE UP (ref 3.5–5.3)
POTASSIUM SERPL-MCNC: 4 MMOL/L — SIGNIFICANT CHANGE UP (ref 3.5–5.3)
POTASSIUM SERPL-SCNC: 3.9 MMOL/L — SIGNIFICANT CHANGE UP (ref 3.5–5.3)
POTASSIUM SERPL-SCNC: 4 MMOL/L — SIGNIFICANT CHANGE UP (ref 3.5–5.3)
PROT SERPL-MCNC: 7.6 G/DL — SIGNIFICANT CHANGE UP (ref 6–8.3)
PROT SERPL-MCNC: 8.1 G/DL — SIGNIFICANT CHANGE UP (ref 6–8.3)
PROT UR-MCNC: ABNORMAL
PROTHROM AB SERPL-ACNC: 14.2 SEC — HIGH (ref 10.5–13.4)
RBC # BLD: 4.33 M/UL — SIGNIFICANT CHANGE UP (ref 3.8–5.2)
RBC # BLD: 4.71 M/UL — SIGNIFICANT CHANGE UP (ref 3.8–5.2)
RBC # FLD: 14.6 % — HIGH (ref 10.3–14.5)
RBC # FLD: 14.6 % — HIGH (ref 10.3–14.5)
RBC CASTS # UR COMP ASSIST: 6 /HPF — HIGH (ref 0–4)
RSV RNA NPH QL NAA+NON-PROBE: SIGNIFICANT CHANGE UP
SARS-COV-2 RNA SPEC QL NAA+PROBE: SIGNIFICANT CHANGE UP
SODIUM SERPL-SCNC: 155 MMOL/L — HIGH (ref 135–145)
SODIUM SERPL-SCNC: 159 MMOL/L — HIGH (ref 135–145)
SP GR SPEC: 1.03 — SIGNIFICANT CHANGE UP (ref 1.01–1.05)
UROBILINOGEN FLD QL: SIGNIFICANT CHANGE UP
WBC # BLD: 14.01 K/UL — HIGH (ref 3.8–10.5)
WBC # BLD: 14.43 K/UL — HIGH (ref 3.8–10.5)
WBC # FLD AUTO: 14.01 K/UL — HIGH (ref 3.8–10.5)
WBC # FLD AUTO: 14.43 K/UL — HIGH (ref 3.8–10.5)
WBC UR QL: 4 /HPF — SIGNIFICANT CHANGE UP (ref 0–5)

## 2023-01-04 PROCEDURE — 71045 X-RAY EXAM CHEST 1 VIEW: CPT | Mod: 26

## 2023-01-04 PROCEDURE — 70450 CT HEAD/BRAIN W/O DYE: CPT | Mod: 26

## 2023-01-04 PROCEDURE — 99254 IP/OBS CNSLTJ NEW/EST MOD 60: CPT

## 2023-01-04 RX ORDER — ACETAMINOPHEN 500 MG
650 TABLET ORAL EVERY 8 HOURS
Refills: 0 | Status: DISCONTINUED | OUTPATIENT
Start: 2023-01-04 | End: 2023-01-16

## 2023-01-04 RX ORDER — AMLODIPINE BESYLATE 2.5 MG/1
5 TABLET ORAL DAILY
Refills: 0 | Status: DISCONTINUED | OUTPATIENT
Start: 2023-01-04 | End: 2023-02-02

## 2023-01-04 RX ORDER — LEVETIRACETAM 250 MG/1
500 TABLET, FILM COATED ORAL
Refills: 0 | Status: DISCONTINUED | OUTPATIENT
Start: 2023-01-04 | End: 2023-01-04

## 2023-01-04 RX ORDER — LEVETIRACETAM 250 MG/1
500 TABLET, FILM COATED ORAL EVERY 12 HOURS
Refills: 0 | Status: DISCONTINUED | OUTPATIENT
Start: 2023-01-04 | End: 2023-01-24

## 2023-01-04 RX ORDER — FERROUS SULFATE 325(65) MG
325 TABLET ORAL DAILY
Refills: 0 | Status: DISCONTINUED | OUTPATIENT
Start: 2023-01-04 | End: 2023-02-02

## 2023-01-04 RX ORDER — SODIUM CHLORIDE 9 MG/ML
1000 INJECTION, SOLUTION INTRAVENOUS
Refills: 0 | Status: DISCONTINUED | OUTPATIENT
Start: 2023-01-04 | End: 2023-01-05

## 2023-01-04 RX ORDER — QUETIAPINE FUMARATE 200 MG/1
25 TABLET, FILM COATED ORAL THREE TIMES A DAY
Refills: 0 | Status: DISCONTINUED | OUTPATIENT
Start: 2023-01-04 | End: 2023-02-02

## 2023-01-04 RX ORDER — LANOLIN ALCOHOL/MO/W.PET/CERES
3 CREAM (GRAM) TOPICAL AT BEDTIME
Refills: 0 | Status: DISCONTINUED | OUTPATIENT
Start: 2023-01-04 | End: 2023-02-02

## 2023-01-04 RX ORDER — PIPERACILLIN AND TAZOBACTAM 4; .5 G/20ML; G/20ML
3.38 INJECTION, POWDER, LYOPHILIZED, FOR SOLUTION INTRAVENOUS EVERY 8 HOURS
Refills: 0 | Status: COMPLETED | OUTPATIENT
Start: 2023-01-04 | End: 2023-01-11

## 2023-01-04 RX ORDER — PIPERACILLIN AND TAZOBACTAM 4; .5 G/20ML; G/20ML
3.38 INJECTION, POWDER, LYOPHILIZED, FOR SOLUTION INTRAVENOUS ONCE
Refills: 0 | Status: COMPLETED | OUTPATIENT
Start: 2023-01-04 | End: 2023-01-04

## 2023-01-04 RX ORDER — POLYETHYLENE GLYCOL 3350 17 G/17G
17 POWDER, FOR SOLUTION ORAL
Refills: 0 | Status: DISCONTINUED | OUTPATIENT
Start: 2023-01-04 | End: 2023-02-02

## 2023-01-04 RX ORDER — SODIUM CHLORIDE 9 MG/ML
1000 INJECTION INTRAMUSCULAR; INTRAVENOUS; SUBCUTANEOUS
Refills: 0 | Status: DISCONTINUED | OUTPATIENT
Start: 2023-01-04 | End: 2023-01-04

## 2023-01-04 RX ORDER — TRAZODONE HCL 50 MG
150 TABLET ORAL DAILY
Refills: 0 | Status: DISCONTINUED | OUTPATIENT
Start: 2023-01-04 | End: 2023-02-02

## 2023-01-04 RX ORDER — ACETAMINOPHEN 500 MG
650 TABLET ORAL ONCE
Refills: 0 | Status: COMPLETED | OUTPATIENT
Start: 2023-01-04 | End: 2023-01-04

## 2023-01-04 RX ADMIN — PIPERACILLIN AND TAZOBACTAM 25 GRAM(S): 4; .5 INJECTION, POWDER, LYOPHILIZED, FOR SOLUTION INTRAVENOUS at 22:13

## 2023-01-04 RX ADMIN — LEVETIRACETAM 400 MILLIGRAM(S): 250 TABLET, FILM COATED ORAL at 18:34

## 2023-01-04 RX ADMIN — SODIUM CHLORIDE 100 MILLILITER(S): 9 INJECTION INTRAMUSCULAR; INTRAVENOUS; SUBCUTANEOUS at 09:42

## 2023-01-04 RX ADMIN — PIPERACILLIN AND TAZOBACTAM 25 GRAM(S): 4; .5 INJECTION, POWDER, LYOPHILIZED, FOR SOLUTION INTRAVENOUS at 15:47

## 2023-01-04 RX ADMIN — SODIUM CHLORIDE 100 MILLILITER(S): 9 INJECTION INTRAMUSCULAR; INTRAVENOUS; SUBCUTANEOUS at 03:51

## 2023-01-04 RX ADMIN — Medication 650 MILLIGRAM(S): at 19:01

## 2023-01-04 RX ADMIN — PIPERACILLIN AND TAZOBACTAM 200 GRAM(S): 4; .5 INJECTION, POWDER, LYOPHILIZED, FOR SOLUTION INTRAVENOUS at 11:25

## 2023-01-04 RX ADMIN — Medication 650 MILLIGRAM(S): at 10:44

## 2023-01-04 RX ADMIN — SODIUM CHLORIDE 70 MILLILITER(S): 9 INJECTION, SOLUTION INTRAVENOUS at 11:56

## 2023-01-04 NOTE — ED PROVIDER NOTE - NSICDXPASTMEDICALHX_GEN_ALL_CORE_FT
PAST MEDICAL HISTORY:  Dementia     History of Fayette's disease     HLD (hyperlipidemia)     HTN (hypertension)

## 2023-01-04 NOTE — ED ADULT NURSE NOTE - OBJECTIVE STATEMENT
break coverage RN - Pt received in room 25, non verbal. PMHx traumatic subdural hemorrhage, dementia, HTN. Brought from [facility] for failure to thrive. Facility states that for the past 3 days the pt has been spitting out her medications and food, and has significantly decreased her PO intake. Per the facility, the pt is A&Ox0 and nonverbal at baseline. She does occasionally follow commands. On examination here pt was not following commands. Resp even and unlabored. Arrives with 20G IV placed to L forearm. Labs drawn and sent. Will continue to monitor.

## 2023-01-04 NOTE — ED PROVIDER NOTE - ATTENDING CONTRIBUTION TO CARE
52-year-old female with a past medical history of SDH, dementia, hypertension, presenting from nursing home for reported failure to thrive.  Patient is unable provide any history secondary to her nonverbal status due to her dementia.  Per nursing home report the patient has been spitting out her medications and food which made them concerned.  They state that her examination prior to sending her is at baseline for her.    Vitals: I have reviewed the patients vital signs  General: nontoxic appearing  HEENT: Atraumatic, normocephalic, airway patent  Eyes: PERRL  Neck: no tracheal deviation  Chest/Lungs: no trauma, symmetric chest rise,  no resp distress  Heart: skin and extremities well perfused, regular rate and rhythm  Neuro: A+Ox0, not able to participate exam  MSK: no deformities   Skin: no cyanosis, no jaundice     For the full details of the medical decision making and ED clinical course please see the MDM and progress note sections in the main body of the provider note

## 2023-01-04 NOTE — H&P ADULT - ASSESSMENT
53 yo female with a PMHx of traumatic subdural hemorrhage, dementia, HTN,  who was BIMBEMS from [facility] for failure to thrive. Facility states that for the past 3 days the pt has been spitting out her medications and food, and has significantly decreased her PO intake. Per the facility, the pt is A&Ox0 and nonverbal at baseline. She does occasionally follow commands. On examination here pt was not following commands    1 FTT  - poor po inatke  - sepeech and swallow  - will follow recs  - calorie count    2 Fever  - unclear etiology  - check CXR  - check UA and urine culture  - check blood cultures  - ID fu    3 Hypernatremia  - monitor BMP  - IVF     4 HTN  - cw home meds  - DASH diet

## 2023-01-04 NOTE — ED ADULT NURSE NOTE - CHIEF COMPLAINT QUOTE
Pt brought in by EMS from NH for failure to thrive and fever. Pt has a hx of Harding's disease and dementia per EMS. pt nonverbal. Pt noted to have 20g L arm placed by EMS.

## 2023-01-04 NOTE — CONSULT NOTE ADULT - ASSESSMENT
51 yo female with a PMHx of traumatic subdural hemorrhage, dementia, HTN,  who was BIMB[facility]S from [facility] for failure to thrive.   53 yo female with a PMHx of traumatic subdural hemorrhage, dementia, HTN,  who was BIMB[facility]S from [facility] for failure to thrive.     Overall fever, tachycardia, leucocytosis, sepsis, ? source.     PLAN:   c/w zosyn   f/u blood cx  f/u urine cx   trend cbc, for leucocytosis   recommend CT chest/abd/pelvis with contrast if possible.       Plan discussed with Medicine Attending, NP.      Radha Ramirez  Please contact through MS Teams   If no response or past 5 pm/weekend call [phone].

## 2023-01-04 NOTE — H&P ADULT - NSHPLABSRESULTS_GEN_ALL_CORE
Lab Results:  CBC  CBC Full  -  ( 2023 08:00 )  WBC Count : 14.43 K/uL  RBC Count : 4.33 M/uL  Hemoglobin : 12.2 g/dL  Hematocrit : 41.6 %  Platelet Count - Automated : 494 K/uL  Mean Cell Volume : 96.1 fL  Mean Cell Hemoglobin : 28.2 pg  Mean Cell Hemoglobin Concentration : 29.3 gm/dL  Auto Neutrophil # : x  Auto Lymphocyte # : x  Auto Monocyte # : x  Auto Eosinophil # : x  Auto Basophil # : x  Auto Neutrophil % : x  Auto Lymphocyte % : x  Auto Monocyte % : x  Auto Eosinophil % : x  Auto Basophil % : x    .		Differential:	[] Automated		[] Manual  Chemistry                        12.2   14.43 )-----------( 494      ( 2023 08:00 )             41.6     01-04    159<H>  |  123<H>  |  38<H>  ----------------------------<  108<H>  3.9   |  24  |  0.63    Ca    10.0      2023 08:00  Phos  2.2     01-04  Mg     2.60     01-04    TPro  7.6  /  Alb  3.1<L>  /  TBili  0.4  /  DBili  x   /  AST  12  /  ALT  12  /  AlkPhos  81  01-04    LIVER FUNCTIONS - ( 2023 08:00 )  Alb: 3.1 g/dL / Pro: 7.6 g/dL / ALK PHOS: 81 U/L / ALT: 12 U/L / AST: 12 U/L / GGT: x           PT/INR - ( 2023 01:00 )   PT: 14.2 sec;   INR: 1.22 ratio         PTT - ( 2023 01:00 )  PTT:27.2 sec  Urinalysis Basic - ( 2023 03:40 )    Color: Yellow / Appearance: Clear / S.033 / pH: x  Gluc: x / Ketone: Negative  / Bili: Negative / Urobili: <2 mg/dL   Blood: x / Protein: 30 mg/dL / Nitrite: Negative   Leuk Esterase: Negative / RBC: 6 /HPF / WBC 4 /HPF   Sq Epi: x / Non Sq Epi: 12 /HPF / Bacteria: Few            MICROBIOLOGY/CULTURES:      RADIOLOGY RESULTS: reviewed

## 2023-01-04 NOTE — H&P ADULT - HISTORY OF PRESENT ILLNESS
51 yo female with a PMHx of traumatic subdural hemorrhage, dementia, HTN,  who was BIMB[facility]S from [facility] for failure to thrive. Facility states that for the past 3 days the pt has been spitting out her medications and food, and has significantly decreased her PO intake. Per the facility, the pt is A&Ox0 and nonverbal at baseline. She does occasionally follow commands. On examination here pt was not following commands

## 2023-01-04 NOTE — ED PROVIDER NOTE - PROGRESS NOTE DETAILS
Spoke with nursing home facility to obtain more information on pt presentation; per facility pt has had dec PO intake and has been spitting out her meds/food for the past 3 days. There were no acute changes in clinical status today Pt found to be hypernatremic to 155 and was started on IVF @100

## 2023-01-04 NOTE — ED PROVIDER NOTE - OBJECTIVE STATEMENT
53 yo female with a PMHx of traumatic subdural hemorrhage, dementia, HTN,  who was BIMBEMS from [facility] for failure to thrive. Facility states that for the past 3 days the pt has been spitting out her medications and food, and has significantly decreased her PO intake. Per the facility, the pt is A&Ox0 and nonverbal at baseline. She does occasionally follow commands. On examination here pt was not following commands.

## 2023-01-04 NOTE — ED ADULT NURSE NOTE - NSICDXPASTMEDICALHX_GEN_ALL_CORE_FT
PAST MEDICAL HISTORY:  Dementia     History of [place]'s disease     HLD (hyperlipidemia)     HTN (hypertension)

## 2023-01-04 NOTE — H&P ADULT - NSHPPHYSICALEXAM_GEN_ALL_CORE
General: frail  Respiratory: CTA B/L  CV: s1s2+  Abdominal: Soft, NT, ND +BS, Last BM  Extremities: No edema, + peripheral pulses  Skin Normal

## 2023-01-04 NOTE — CONSULT NOTE ADULT - SUBJECTIVE AND OBJECTIVE BOX
Patient is a 52y old  Female who presents with a chief complaint of FTT (04 Jan 2023 13:37)      HPI:   53 yo female with a PMHx of traumatic subdural hemorrhage, dementia, HTN,  who was BIMBEMS from [facility] for failure to thrive. Facility states that for the past 3 days the pt has been spitting out her medications and food, and has significantly decreased her PO intake. Per the facility, the pt is A&Ox0 and nonverbal at baseline. She does occasionally follow commands. On examination here pt was not following commands (04 Jan 2023 10:38)      PAST MEDICAL & SURGICAL HISTORY:  History of Dolores&#x27;s disease      Dementia      HTN (hypertension)      HLD (hyperlipidemia)      No significant past surgical history          REVIEW OF SYSTEMS    General:	No Fevers, no chills     Skin: No rash  	  Ophthalmologic: Denies any discharge, redness.  	  ENMT: No nasal congestion or throat pain.     Respiratory and Thorax: No cough, sputum. Denies shortness of breath.  	  Cardiovascular: No chest pain,     Gastrointestinal: No nausea, abdominal pain or diarrhea.    Genitourinary: No dysuria,     Musculoskeletal: No joint swelling     Neurological: No new extremity weakness.    Psychiatric: No hallucinations	    Extremities: No swelling     Endocrine: No polyuria or polydipsia, no abnormal heat or cold intolerance     Allergic/Immunologic:	No hives    Social history:    FAMILY HISTORY:  No pertinent family history in first degree relatives        Allergies    No Known Allergies    Intolerances        Antimicrobials:    piperacillin/tazobactam IVPB.- 3.375 Gram(s) IV Intermittent once  piperacillin/tazobactam IVPB.. 3.375 Gram(s) IV Intermittent every 8 hours        Vital Signs Last 24 Hrs  T(C): 38.7 (04 Jan 2023 11:35), Max: 39 (04 Jan 2023 10:13)  T(F): 101.7 (04 Jan 2023 11:35), Max: 102.2 (04 Jan 2023 10:13)  HR: 107 (04 Jan 2023 12:50) (95 - 127)  BP: 125/81 (04 Jan 2023 12:50) (122/95 - 139/102)  BP(mean): 113 (04 Jan 2023 00:54) (113 - 113)  RR: 18 (04 Jan 2023 12:50) (15 - 21)  SpO2: 96% (04 Jan 2023 12:50) (94% - 100%)    Parameters below as of 04 Jan 2023 12:50  Patient On (Oxygen Delivery Method): room air        PHYSICAL EXAM: Patient in no acute distress.    Constitutional: Comfortable. Awake and alert    Eyes: No discharge or conjunctival injection    ENMT: No thrush. No pharyngeal erythema.    Neck: Supple,     Respiratory:  + air entry bilaterally.    Cardiovascular: S1 S2 wnl,     Gastrointestinal: Soft BS(+) no tenderness, non distended.    Genitourinary: No CVA tenderness     Extremities: No edema.    Vascular: peripheral pulses felt    Neurological: No new gross focal deficits.    Skin: No rash     Musculoskeletal: No joint swelling.    Psychiatric: Affect normal.                              12.2   14.43 )-----------( 494      ( 04 Jan 2023 08:00 )             41.6       01-04    159<H>  |  123<H>  |  38<H>  ----------------------------<  108<H>  3.9   |  24  |  0.63    Ca    10.0      04 Jan 2023 08:00  Phos  2.2     01-04  Mg     2.60     01-04    TPro  7.6  /  Alb  3.1<L>  /  TBili  0.4  /  DBili  x   /  AST  12  /  ALT  12  /  AlkPhos  81  01-04            Radiology: Imaging reviewed and visualized personally [ x]               Patient is a 52y old  Female who presents with a chief complaint of FTT (04 Jan 2023 13:37)      HPI:   51 yo female with a PMHx of traumatic subdural hemorrhage, dementia, HTN,  who was B[facility]S from [facility] for failure to thrive. Facility states that for the past 3 days the pt has been spitting out her medications and food, and has significantly decreased her PO intake. Per the facility, the pt is A&Ox0 and nonverbal at baseline. She does occasionally follow commands. On examination here pt was not following commands (04 Jan 2023 10:38)  Above reviewed:   Pt awake but not verbal ROS unable to be obtained.       PAST MEDICAL & SURGICAL HISTORY:  History of [place]&#x27;s disease      Dementia      HTN (hypertension)      HLD (hyperlipidemia)      No significant past surgical history          REVIEW OF SYSTEMS  Unable to obtain due to pt's underlying mental status.         Social history:  From NH         FAMILY HISTORY:  Unable to obtain due to pt's underlying mental status.         Allergies  No Known Allergies        Antimicrobials:  piperacillin/tazobactam IVPB.- 3.375 Gram(s) IV Intermittent once  piperacillin/tazobactam IVPB.. 3.375 Gram(s) IV Intermittent every 8 hours        Vital Signs Last 24 Hrs  T(C): 38.7 (04 Jan 2023 11:35), Max: 39 (04 Jan 2023 10:13)  T(F): 101.7 (04 Jan 2023 11:35), Max: 102.2 (04 Jan 2023 10:13)  HR: 107 (04 Jan 2023 12:50) (95 - 127)  BP: 125/81 (04 Jan 2023 12:50) (122/95 - 139/102)  BP(mean): 113 (04 Jan 2023 00:54) (113 - 113)  RR: 18 (04 Jan 2023 12:50) (15 - 21)  SpO2: 96% (04 Jan 2023 12:50) (94% - 100%)    Parameters below as of 04 Jan 2023 12:50  Patient On (Oxygen Delivery Method): room air        PHYSICAL EXAM: Patient in no acute distress.    Constitutional: Comfortable. Awake, cachectic     Eyes: No discharge or conjunctival injection    ENMT: No thrush.     Neck: Supple,     Respiratory:  + air entry bilaterally.    Cardiovascular: S1 S2 wnl, tachy     Gastrointestinal: Soft BS(+) non distended.    Genitourinary: No sanchez     Extremities: No edema.    Vascular: peripheral pulses felt    Neurological: No new gross focal deficits.    Skin: No rash     Musculoskeletal: No joint swelling.    Psychiatric: Affect normal.                              12.2   14.43 )-----------( 494      ( 04 Jan 2023 08:00 )             41.6       01-04    159<H>  |  123<H>  |  38<H>  ----------------------------<  108<H>  3.9   |  24  |  0.63    Ca    10.0      04 Jan 2023 08:00  Phos  2.2     01-04  Mg     2.60     01-04    TPro  7.6  /  Alb  3.1<L>  /  TBili  0.4  /  DBili  x   /  AST  12  /  ALT  12  /  AlkPhos  81  01-04            Radiology: Imaging reviewed and visualized personally [ x]               Patient is a 52y old  Female who presents with a chief complaint of FTT (04 Jan 2023 13:37)      HPI:   51 yo female with a PMHx of traumatic subdural hemorrhage, dementia, HTN,  who was BIMBEMS from [facility] for failure to thrive. Facility states that for the past 3 days the pt has been spitting out her medications and food, and has significantly decreased her PO intake. Per the facility, the pt is A&Ox0 and nonverbal at baseline. She does occasionally follow commands. On examination here pt was not following commands (04 Jan 2023 10:38)  Above reviewed:   Pt awake but not verbal ROS unable to be obtained.       PAST MEDICAL & SURGICAL HISTORY:  History of [place]&#x27;s disease      Dementia      HTN (hypertension)      HLD (hyperlipidemia)      No significant past surgical history          REVIEW OF SYSTEMS  Unable to obtain due to pt's underlying mental status.         Social history:  From NH         FAMILY HISTORY:  Unable to obtain due to pt's underlying mental status.         Allergies  No Known Allergies        Antimicrobials:  piperacillin/tazobactam IVPB.- 3.375 Gram(s) IV Intermittent once  piperacillin/tazobactam IVPB.. 3.375 Gram(s) IV Intermittent every 8 hours        Vital Signs Last 24 Hrs  T(C): 38.7 (04 Jan 2023 11:35), Max: 39 (04 Jan 2023 10:13)  T(F): 101.7 (04 Jan 2023 11:35), Max: 102.2 (04 Jan 2023 10:13)  HR: 107 (04 Jan 2023 12:50) (95 - 127)  BP: 125/81 (04 Jan 2023 12:50) (122/95 - 139/102)  BP(mean): 113 (04 Jan 2023 00:54) (113 - 113)  RR: 18 (04 Jan 2023 12:50) (15 - 21)  SpO2: 96% (04 Jan 2023 12:50) (94% - 100%)    Parameters below as of 04 Jan 2023 12:50  Patient On (Oxygen Delivery Method): room air        PHYSICAL EXAM: Patient in no acute distress.    Constitutional: Comfortable. Awake, cachectic     Eyes: No discharge or conjunctival injection    ENMT: No thrush.     Neck: Supple,     Respiratory:  + air entry bilaterally.    Cardiovascular: S1 S2 wnl, tachy     Gastrointestinal: Soft BS(+) non distended.    Genitourinary: No sanchez     Extremities: No edema. contractures.     Vascular: peripheral pulses felt    Neurological: No new gross focal deficits.    Skin: No rash     Musculoskeletal: No joint swelling.    Psychiatric: Affect normal.                              12.2   14.43 )-----------( 494      ( 04 Jan 2023 08:00 )             41.6       01-04    159<H>  |  123<H>  |  38<H>  ----------------------------<  108<H>  3.9   |  24  |  0.63    Ca    10.0      04 Jan 2023 08:00  Phos  2.2     01-04  Mg     2.60     01-04    TPro  7.6  /  Alb  3.1<L>  /  TBili  0.4  /  DBili  x   /  AST  12  /  ALT  12  /  AlkPhos  81  01-04      Urinalysis (01.04.23 @ 03:40)   Glucose Qualitative, Urine: Negative   Blood, Urine: Small   pH Urine: 5.5   Color: Yellow   Urine Appearance: Clear   Bilirubin: Negative   Ketone - Urine: Negative   Specific Gravity: 1.033   Protein, Urine: 30 mg/dL   Urobilinogen: <2 mg/dL   Nitrite: Negative   Leukocyte Esterase Concentration: Negative     Urine Microscopic-Add On (NC) (01.04.23 @ 03:40)   Red Blood Cell - Urine: 6 /HPF   White Blood Cell - Urine: 4 /HPF   Hyaline Casts: 0 /LPF   Bacteria: Few   Comment - Urine: Mucus strands present.   Epithelial Cells: 12 /HPF       Radiology: No imaging performed.

## 2023-01-04 NOTE — ED ADULT NURSE REASSESSMENT NOTE - NS ED NURSE REASSESS COMMENT FT1
Medications attempted to be administered as per orders. pt. unable to tolerate PO. dysphagia screening performed, failed as per protocol. ALCIDES Foster from ACP made aware. orders to be changed.
Report received from maximiliano Queen. Pt nonverbal, at baseline. Breathing even, unlabored, no signs of distress. Vitals are stable. Pt admitted to medicine pending bed assignment. Fall precautions in place.

## 2023-01-04 NOTE — CONSULT NOTE ADULT - ASSESSMENT
52y Female with history of SDH, dementia, HTN presents with FTT. Nephrology consulted for hypernatremia.    1) Hypernatremia: Due to free water deficit. Change NS to D5W @ 70 ml/hour. Monitor serum Na and do not correct more than 8-10 meQ/day.    2) JOAQUIN: Secondary to pre-renal azotemia. IVF as above. Avoid nephrotoxins. Monitor electrolytes.    3) HTN: BP uncontrolled with tachycardia in setting of sepsis. Resume home medications. Monitor BP.    4) FTT: As per primary team.      [facility] NEPHROLOGY  Misha Littlejohn M.D.  Kenneth Woody D.O.  Mimi Dey M.D.  Celia Yao, FARTUN, ANP-C    Telephone: [phone]  Facsimile: [phone]    [id number] [id number]

## 2023-01-04 NOTE — CONSULT NOTE ADULT - SUBJECTIVE AND OBJECTIVE BOX
[facility] NEPHROLOGY- CONSULTATION NOTE    52y Female with history of below presents with FTT. Nephrology consulted for hypernatremia.    Patient with normal serum Na as per prior labs in EHR. Patient admitted for FTT. Patient does not have a PEG.     Unable to obtain information from patient as patient not verbal. Patient not on any diuretics as an outpatient.    REVIEW OF SYSTEMS: Unable to obtain as patient not verbal.    No Known Allergies      Home Medications Reviewed  Hospital Medications:   MEDICATIONS  (STANDING):  amLODIPine   Tablet 5 milliGRAM(s) Oral daily  ferrous    sulfate 325 milliGRAM(s) Oral daily  piperacillin/tazobactam IVPB.- 3.375 Gram(s) IV Intermittent once  piperacillin/tazobactam IVPB.. 3.375 Gram(s) IV Intermittent every 8 hours  polyethylene glycol 3350 17 Gram(s) Oral two times a day  sodium chloride 0.9%. 1000 milliLiter(s) (100 mL/Hr) IV Continuous <Continuous>  traZODone 150 milliGRAM(s) Oral daily      PAST MEDICAL & SURGICAL HISTORY:  History of [place]&#x27;s disease      Dementia      HTN (hypertension)      HLD (hyperlipidemia)      No significant past surgical history          FAMILY HISTORY:  No pertinent family history in first degree relatives        SOCIAL HISTORY:  Denies toxic substance use     VITALS:  T(F): 101.7 (23 @ 11:35), Max: 102.2 (23 @ 10:13)  HR: 111 (23 @ 10:13)  BP: 133/100 (23 @ 10:13)  RR: 16 (23 @ 10:13)  SpO2: 94% (23 @ 10:13)  Wt(kg): --      PHYSICAL EXAM:  Gen: NAD, frail, cachectic  HEENT: dry MM  Neck: no JVD  Cards: RRR, +S1/S2, no M/G/R  Resp: CTA B/L  GI: soft, NT/ND, NABS  : no CVA tenderness  Vascular: no LE edema B/L  Derm: no rashes  Neuro: Unable to assess      LABS:      159<H>  |  123<H>  |  38<H>  ----------------------------<  108<H>  3.9   |  24  |  0.63    Ca    10.0      2023 08:00  Phos  2.2     01-04  Mg     2.60     01-04    TPro  7.6  /  Alb  3.1<L>  /  TBili  0.4  /  DBili      /  AST  12  /  ALT  12  /  AlkPhos  81  01-04    Creatinine Trend: 0.63 <--, 0.67 <--                        12.2   14.43 )-----------( 494      ( 2023 08:00 )             41.6     Urine Studies:  Urinalysis Basic - ( 2023 03:40 )    Color: Yellow / Appearance: Clear / S.033 / pH:   Gluc:  / Ketone: Negative  / Bili: Negative / Urobili: <2 mg/dL   Blood:  / Protein: 30 mg/dL / Nitrite: Negative   Leuk Esterase: Negative / RBC: 6 /HPF / WBC 4 /HPF   Sq Epi:  / Non Sq Epi: 12 /HPF / Bacteria: Few          RADIOLOGY & ADDITIONAL STUDIES:    N/A

## 2023-01-04 NOTE — ED PROVIDER NOTE - PHYSICAL EXAMINATION
PHYSICAL EXAM:  GENERAL: nonverbal, cachetic appearing   HEAD:  Atraumatic, Normocephalic  EYES: conjunctiva and sclera clear  ENMT: No tonsillar erythema, exudates, or enlargement;  NECK: Supple, No JVD, Normal thyroid  HEART: Regular rate and rhythm; No murmurs, rubs, or gallops  RESPIRATORY: CTA B/L, No W/R/R  ABDOMEN: Soft, Nontender, Nondistended; Bowel sounds present  NEUROLOGY: A&Ox0  EXTREMITIES: All extremities are contracted

## 2023-01-04 NOTE — ED PROVIDER NOTE - CLINICAL SUMMARY MEDICAL DECISION MAKING FREE TEXT BOX
Kevin KITCHEN: Patient presenting with history of dementia nonverbal at baseline with decreased p.o. intake and not taking medications.  Independent history was obtained from the nursing home stating that she is spitting out her food and pills.  They are concerned for loss of weight and possible dehydration.  Differential also includes infection, electrolyte abnormalities, anemia.  Will order labs and review along the lines of a CBC, CMP, urinalysis.  Will place IV and give IV hydration.  I considered ordering a CAT scan however there is no change in her mental status and appears nonfocal neurologically and as such no real suspicion for CNS lesion.  Based on the results of the laboratory studies and her vital signs we will see if patient can be discharged back to nursing home requires admission for further work-up Kevin KITCHEN: Patient presenting with history of dementia nonverbal at baseline with decreased p.o. intake and not taking medications.  Independent history was obtained from the nursing home stating that she is spitting out her food and pills.  They are concerned for loss of weight and possible dehydration.  Differential also includes infection, electrolyte abnormalities, anemia.  Will order labs and review along the lines of a CBC, CMP, urinalysis.  Will place IV and give IV hydration.  I considered ordering a CAT scan however there is no change in her mental status and appears nonfocal neurologically and as such no real suspicion for CNS lesion.  Based on the results of the laboratory studies and her vital signs we will see if patient can be discharged back to nursing home or requires admission for further work-up. Pt found to be have leukocytosis and hypernatremia to 155. She was started on IVF and will admitted for further management

## 2023-01-04 NOTE — CONSULT NOTE ADULT - SUBJECTIVE AND OBJECTIVE BOX
[facility]        Patient is a 52y old  Female who presents with a chief complaint of FTT (2023 11:43)    Excerpt from H&P,"     51 yo female with a PMHx of traumatic subdural hemorrhage, dementia, HTN,  who was BIMBEMS from [facility] for failure to thrive. Facility states that for the past 3 days the pt has been spitting out her medications and food, and has significantly decreased her PO intake. Per the facility, the pt is A&Ox0 and nonverbal at baseline. She does occasionally follow commands. On examination here pt was not following commands    cachectic, emaciated   called for hypernatremia          *****PAST MEDICAL / Surgical  HISTORY:  PAST MEDICAL & SURGICAL HISTORY:  History of [place]&#x27;s disease      Dementia      HTN (hypertension)      HLD (hyperlipidemia)      No significant past surgical history               *****FAMILY HISTORY:  FAMILY HISTORY:  No pertinent family history in first degree relatives             *****SOCIAL HISTORY:  Alcohol: None  Smoking: None         *****ALLERGIES:   Allergies    No Known Allergies    Intolerances             *****MEDICATIONS: current medication reviewed and documented.   MEDICATIONS  (STANDING):  amLODIPine   Tablet 5 milliGRAM(s) Oral daily  dextrose 5%. 1000 milliLiter(s) (70 mL/Hr) IV Continuous <Continuous>  ferrous    sulfate 325 milliGRAM(s) Oral daily  piperacillin/tazobactam IVPB.- 3.375 Gram(s) IV Intermittent once  piperacillin/tazobactam IVPB.. 3.375 Gram(s) IV Intermittent every 8 hours  polyethylene glycol 3350 17 Gram(s) Oral two times a day  traZODone 150 milliGRAM(s) Oral daily    MEDICATIONS  (PRN):  melatonin 3 milliGRAM(s) Oral at bedtime PRN Insomnia           *****REVIEW OF SYSTEM:  GEN: no fever, no chills, no pain  RESP: no SOB, no cough, no sputum  CVS: no chest pain, no palpitations, no edema  GI: no abdominal pain, no nausea, no vomiting, no constipation, no diarrhea  : no dysurea, no frequency, no hematurea  Neuro: no headache, no dizziness  PSYCH: no anxiety, no depression  Derm : no itching, no rash         *****VITAL SIGNS:  T(C): 38.7 (23 @ 11:35), Max: 39 (23 @ 10:13)  HR: 111 (23 @ 10:13) (95 - 127)  BP: 133/100 (23 @ 10:13) (122/95 - 139/102)  RR: 16 (23 @ 10:13) (15 - 21)  SpO2: 94% (23 @ 10:13) (94% - 100%)  Wt(kg): --           *****PHYSICAL EXAM: very limited exam   Alert   tracks   non verbal  flat affect   not following any commands    tracks blinks to threat   no facial asymmetry   Tongue is midline    contracture of R hand   b/l lower ext flexor contracted                     *****LAB AND IMAGIN.2   14.43 )-----------( 494      ( 2023 08:00 )             41.6               01-04    159<H>  |  123<H>  |  38<H>  ----------------------------<  108<H>  3.9   |  24  |  0.63    Ca    10.0      2023 08:00  Phos  2.2     -  Mg     2.60     -04    TPro  7.6  /  Alb  3.1<L>  /  TBili  0.4  /  DBili  x   /  AST  12  /  ALT  12  /  AlkPhos  81  01-04    PT/INR - ( 2023 01:00 )   PT: 14.2 sec;   INR: 1.22 ratio         PTT - ( 2023 01:00 )  PTT:27.2 sec                        Urinalysis Basic - ( 2023 03:40 )    Color: Yellow / Appearance: Clear / S.033 / pH: x  Gluc: x / Ketone: Negative  / Bili: Negative / Urobili: <2 mg/dL   Blood: x / Protein: 30 mg/dL / Nitrite: Negative   Leuk Esterase: Negative / RBC: 6 /HPF / WBC 4 /HPF   Sq Epi: x / Non Sq Epi: 12 /HPF / Bacteria: Few        [All pertinent recent Imaging reports reviewed]         *****A S S E S S M E N T   A N D   P L A N :   Excerpt from H&P,"     51 yo female with a PMHx of traumatic subdural hemorrhage, dementia, HTN,  who was BIMBEMS from [facility] for failure to thrive. Facility states that for the past 3 days the pt has been spitting out her medications and food, and has significantly decreased her PO intake. Per the facility, the pt is A&Ox0 and nonverbal at baseline. She does occasionally follow commands. On examination here pt was not following commands    cachectic, emaciated   called for hypernatremia      Problem/Recommendations 1: metabolic encephalopathy worsening underlyig  cognitive impairment  ( as per chart dx of huntingtons, dementia and traumatic subdural)     as per chart at baseline    ct head to r/o any acute process    goc discussion   thiamine iv   s/s eval   overall prognosis is poor          pt at risk for developing delirium, therefore please institute the following preventative measures if possible          - initiating early mobilization          -minimizing "tethers" - IV, oxygen, catheters, etc          -avoiding   sedatives          -maintaining hydration/nutrition          -avoid anticholinergics - diphenhydramine, etc          -pain control    Problem/Recommendations 2: hyperantremia   correction ongoing   defer to nephrology      Problem/Recommendations 3: prerenal state   correction ongoing   ___________________________  Will follow with you.  Thank you,  Mayra Gonzalez MD  Diplomate of the American Board of Neurology and Psychiatry.  Diplomate of the American Board of Vascular Neurology.   [facility] ([facility]), [facility]   Ph: [phone]    Differential diagnosis and plan of care discussed with patient after the evaluation.   Advanced care planning options discussed.   Pain assessed and judicious use of narcotics when appropriate was discussed.  Importance of Fall prevention discussed.  Counseling on Smoking and Alcohol cessation was offered when appropriate.  Counseling on Diet, exercise, and medication compliance was done.   83 minutes spent on the total encounter;  more than 50 % of the visit was spent on counseling  and or coordinating care by the attending physician.    Thank you for allowing me to participate in the care of this jarred patient. Please do not hesitate to call me if you have any questions.     This and subsequent notes  will  inherently be subject to errors including those of syntax and substitutions which may escape proofreading. In such instances original meaning may be extrapolated by contextual derivation.

## 2023-01-04 NOTE — H&P ADULT - NSICDXPASTMEDICALHX_GEN_ALL_CORE_FT
PAST MEDICAL HISTORY:  Dementia     History of [place]'s disease     HLD (hyperlipidemia)     HTN (hypertension)

## 2023-01-05 LAB
ALBUMIN SERPL ELPH-MCNC: 3.2 G/DL — LOW (ref 3.3–5)
ALP SERPL-CCNC: 75 U/L — SIGNIFICANT CHANGE UP (ref 40–120)
ALT FLD-CCNC: 11 U/L — SIGNIFICANT CHANGE UP (ref 4–33)
ANION GAP SERPL CALC-SCNC: 9 MMOL/L — SIGNIFICANT CHANGE UP (ref 7–14)
ANION GAP SERPL CALC-SCNC: 9 MMOL/L — SIGNIFICANT CHANGE UP (ref 7–14)
AST SERPL-CCNC: 17 U/L — SIGNIFICANT CHANGE UP (ref 4–32)
BILIRUB SERPL-MCNC: 0.4 MG/DL — SIGNIFICANT CHANGE UP (ref 0.2–1.2)
BUN SERPL-MCNC: 19 MG/DL — SIGNIFICANT CHANGE UP (ref 7–23)
BUN SERPL-MCNC: 26 MG/DL — HIGH (ref 7–23)
CALCIUM SERPL-MCNC: 9.5 MG/DL — SIGNIFICANT CHANGE UP (ref 8.4–10.5)
CALCIUM SERPL-MCNC: 9.9 MG/DL — SIGNIFICANT CHANGE UP (ref 8.4–10.5)
CHLORIDE SERPL-SCNC: 116 MMOL/L — HIGH (ref 98–107)
CHLORIDE SERPL-SCNC: 119 MMOL/L — HIGH (ref 98–107)
CO2 SERPL-SCNC: 25 MMOL/L — SIGNIFICANT CHANGE UP (ref 22–31)
CO2 SERPL-SCNC: 26 MMOL/L — SIGNIFICANT CHANGE UP (ref 22–31)
CREAT SERPL-MCNC: 0.44 MG/DL — LOW (ref 0.5–1.3)
CREAT SERPL-MCNC: 0.58 MG/DL — SIGNIFICANT CHANGE UP (ref 0.5–1.3)
CULTURE RESULTS: SIGNIFICANT CHANGE UP
EGFR: 108 ML/MIN/1.73M2 — SIGNIFICANT CHANGE UP
EGFR: 116 ML/MIN/1.73M2 — SIGNIFICANT CHANGE UP
GLUCOSE SERPL-MCNC: 109 MG/DL — HIGH (ref 70–99)
GLUCOSE SERPL-MCNC: 133 MG/DL — HIGH (ref 70–99)
HCT VFR BLD CALC: 42.1 % — SIGNIFICANT CHANGE UP (ref 34.5–45)
HGB BLD-MCNC: 12.1 G/DL — SIGNIFICANT CHANGE UP (ref 11.5–15.5)
MAGNESIUM SERPL-MCNC: 2.1 MG/DL — SIGNIFICANT CHANGE UP (ref 1.6–2.6)
MCHC RBC-ENTMCNC: 28.1 PG — SIGNIFICANT CHANGE UP (ref 27–34)
MCHC RBC-ENTMCNC: 28.7 GM/DL — LOW (ref 32–36)
MCV RBC AUTO: 97.9 FL — SIGNIFICANT CHANGE UP (ref 80–100)
NRBC # BLD: 0 /100 WBCS — SIGNIFICANT CHANGE UP (ref 0–0)
NRBC # FLD: 0 K/UL — SIGNIFICANT CHANGE UP (ref 0–0)
PHOSPHATE SERPL-MCNC: 1.9 MG/DL — LOW (ref 2.5–4.5)
PLATELET # BLD AUTO: 349 K/UL — SIGNIFICANT CHANGE UP (ref 150–400)
POTASSIUM SERPL-MCNC: 3.1 MMOL/L — LOW (ref 3.5–5.3)
POTASSIUM SERPL-MCNC: 3.5 MMOL/L — SIGNIFICANT CHANGE UP (ref 3.5–5.3)
POTASSIUM SERPL-SCNC: 3.1 MMOL/L — LOW (ref 3.5–5.3)
POTASSIUM SERPL-SCNC: 3.5 MMOL/L — SIGNIFICANT CHANGE UP (ref 3.5–5.3)
PROT SERPL-MCNC: 7.4 G/DL — SIGNIFICANT CHANGE UP (ref 6–8.3)
RBC # BLD: 4.3 M/UL — SIGNIFICANT CHANGE UP (ref 3.8–5.2)
RBC # FLD: 14.6 % — HIGH (ref 10.3–14.5)
SODIUM SERPL-SCNC: 150 MMOL/L — HIGH (ref 135–145)
SODIUM SERPL-SCNC: 154 MMOL/L — HIGH (ref 135–145)
SPECIMEN SOURCE: SIGNIFICANT CHANGE UP
WBC # BLD: 10.21 K/UL — SIGNIFICANT CHANGE UP (ref 3.8–10.5)
WBC # FLD AUTO: 10.21 K/UL — SIGNIFICANT CHANGE UP (ref 3.8–10.5)

## 2023-01-05 PROCEDURE — 99232 SBSQ HOSP IP/OBS MODERATE 35: CPT

## 2023-01-05 PROCEDURE — 99221 1ST HOSP IP/OBS SF/LOW 40: CPT

## 2023-01-05 RX ORDER — SODIUM CHLORIDE 9 MG/ML
1000 INJECTION, SOLUTION INTRAVENOUS
Refills: 0 | Status: DISCONTINUED | OUTPATIENT
Start: 2023-01-05 | End: 2023-01-06

## 2023-01-05 RX ORDER — POTASSIUM CHLORIDE 20 MEQ
40 PACKET (EA) ORAL EVERY 4 HOURS
Refills: 0 | Status: DISCONTINUED | OUTPATIENT
Start: 2023-01-05 | End: 2023-01-05

## 2023-01-05 RX ORDER — POTASSIUM CHLORIDE 20 MEQ
10 PACKET (EA) ORAL
Refills: 0 | Status: COMPLETED | OUTPATIENT
Start: 2023-01-05 | End: 2023-01-05

## 2023-01-05 RX ORDER — POTASSIUM CHLORIDE 20 MEQ
10 PACKET (EA) ORAL
Refills: 0 | Status: COMPLETED | OUTPATIENT
Start: 2023-01-05 | End: 2023-01-06

## 2023-01-05 RX ORDER — SODIUM HYPOCHLORITE 0.125 %
1 SOLUTION, NON-ORAL MISCELLANEOUS
Refills: 0 | Status: DISCONTINUED | OUTPATIENT
Start: 2023-01-05 | End: 2023-02-02

## 2023-01-05 RX ADMIN — PIPERACILLIN AND TAZOBACTAM 25 GRAM(S): 4; .5 INJECTION, POWDER, LYOPHILIZED, FOR SOLUTION INTRAVENOUS at 15:21

## 2023-01-05 RX ADMIN — SODIUM CHLORIDE 70 MILLILITER(S): 9 INJECTION, SOLUTION INTRAVENOUS at 06:32

## 2023-01-05 RX ADMIN — LEVETIRACETAM 400 MILLIGRAM(S): 250 TABLET, FILM COATED ORAL at 17:16

## 2023-01-05 RX ADMIN — PIPERACILLIN AND TAZOBACTAM 25 GRAM(S): 4; .5 INJECTION, POWDER, LYOPHILIZED, FOR SOLUTION INTRAVENOUS at 22:03

## 2023-01-05 RX ADMIN — SODIUM CHLORIDE 100 MILLILITER(S): 9 INJECTION, SOLUTION INTRAVENOUS at 15:21

## 2023-01-05 RX ADMIN — Medication 1 APPLICATION(S): at 17:15

## 2023-01-05 RX ADMIN — Medication 100 MILLIEQUIVALENT(S): at 12:26

## 2023-01-05 RX ADMIN — Medication 100 MILLIEQUIVALENT(S): at 16:00

## 2023-01-05 RX ADMIN — SODIUM CHLORIDE 100 MILLILITER(S): 9 INJECTION, SOLUTION INTRAVENOUS at 22:03

## 2023-01-05 RX ADMIN — Medication 100 MILLIEQUIVALENT(S): at 23:41

## 2023-01-05 RX ADMIN — Medication 100 MILLIEQUIVALENT(S): at 15:07

## 2023-01-05 RX ADMIN — PIPERACILLIN AND TAZOBACTAM 25 GRAM(S): 4; .5 INJECTION, POWDER, LYOPHILIZED, FOR SOLUTION INTRAVENOUS at 06:07

## 2023-01-05 RX ADMIN — LEVETIRACETAM 400 MILLIGRAM(S): 250 TABLET, FILM COATED ORAL at 05:26

## 2023-01-05 NOTE — SWALLOW BEDSIDE ASSESSMENT ADULT - COMMENTS
H&P 1/4: "53 yo female with a PMHx of traumatic subdural hemorrhage, dementia, HTN,  who was BIMBEMS from [facility] for failure to thrive. Facility states that for the past 3 days the pt has been spitting out her medications and food, and has significantly decreased her PO intake. Per the facility, the pt is A&Ox0 and nonverbal at baseline. She does occasionally follow commands. On examination here pt was not following commands."    Neuro Note 1/4: "Problem/Recommendations 1: metabolic encephalopathy worsening underlyig  cognitive impairment  ( as per chart dx of huntingtons, dementia and traumatic subdural) - as per chart at baseline / ct head to r/o any acute process / goc discussion / thiamine iv / s/s eval / overall prognosis is poor."    Patient seen at bedside awake/alert during clinical swallow evaluation this AM with patient's cousin present at bedside. Patient is nonverbal and is able to follow simple commands intermittently given repetition. H&P 1/4: "51 yo female with a PMHx of traumatic subdural hemorrhage, dementia, HTN,  who was BIMBEMS from [facility] for failure to thrive. Facility states that for the past 3 days the pt has been spitting out her medications and food, and has significantly decreased her PO intake. Per the facility, the pt is A&Ox0 and nonverbal at baseline. She does occasionally follow commands. On examination here pt was not following commands."    Neuro Note 1/4: "Problem/Recommendations 1: metabolic encephalopathy worsening underlyig  cognitive impairment  ( as per chart dx of huntingtons, dementia and traumatic subdural) - as per chart at baseline / ct head to r/o any acute process / goc discussion / thiamine iv / s/s eval / overall prognosis is poor."    Per NH chart, patient's diet as Puree with Nectar Thick Liquids.     Patient seen at bedside awake/alert during clinical swallow evaluation this AM with patient's cousin present at bedside. Patient is nonverbal and is able to follow simple commands intermittently given repetition.

## 2023-01-05 NOTE — CONSULT NOTE ADULT - ASSESSMENT
Assessment/Plan: 53 yo female with a PMHx of traumatic subdural hemorrhage, arslan's disease, dementia, HTN,  who was BIBEMS from [facility] for failure to thrive.     Wound Consult requested to assist w/ management of chronic stage 4 pressure injury.  - extensive bone exposed, viable granulation and muscle.   - Scattered areas of purple-maroon discoloration.  - No necrotic tissue.  - (+) circumferential undermining  - no purulent drainage, no odor.   - No s/s of acute infection.  - (+) bedbound  - severe cachexia; protruding bony prominences.  - Incontinent urine and stool.  - poor PO intake, FTT  - CT AP 8/4/2022 Suggest further evaluation for suspected sacral osteomyelitis with MRI  - Consider MRI to rule out osteomyelitis if in line with goals of care  - Will treat with Dakins twice a day in setting of urinary and fecal incontinence; patient frail, severe protein calorie malnutrition; wound unlikely to markedly improve, goal of care to maintain, provide antimicrobial support.  - Topical Recommendations: Cleanse wound and periwound with NS. Dry well. Apply Liquid barrier film to periwound skin. Pack areas of undermining and dead space with Dakins 1/4 strength moistened kerlix. Cover with abdominal pad and tegaderm, change twice a day,  - Continue low airloss support surface, t&P per protocol with use of fluidized postioning devices.  - Perineal care per protocol, once dressing is in place apply bruno moisture barrier paste every shift and prn with episodes of incontinence. Continue use of single breathable incontinence pad.  - Continue use of complete cair boots.    FTT  -Recommend nutrition consult, previous with severe protein calorie malnutrition.    Abx per ID; leukocytosis resolved, patient afrebrile, sacral stage 4 unlikely primary source of infection.  Consider goals of care discussion, poor PO intake, FTT, dementia, Arslan's disease, bedbound, severe cachexia temporal and muscle wasting, unable to follow commands, stage 4 pressure injury, incontinent urine and stool.  Consider palliative care consult.    Upon discharge f/u as outpatient at [facility] [address] [facility] [phone]  Seen w/ Dr. Bueno. Findings and plan discussed with primary team. All questions and concerns addressed.  Will follow periodically while inpatient, please reconsult earlier as needed.  Remainder of care per primary team.    Thank you for this consult  CHAD Keller CWN (pager #[phone])    If after 4PM or before 7:30AM on Mon-Friday or weekend/holiday please contact general surgery for urgent matters.   Team A- [id number]   Team B- [id number]  For non-urgent matters e-mail [email or web address]    We spent 70 minutes face to face with this patient of which more than 50% of the time was spent counseling & coordinating care of this pt

## 2023-01-05 NOTE — PROGRESS NOTE ADULT - SUBJECTIVE AND OBJECTIVE BOX
52yPatient is a 52y old  Female who presents with a chief complaint of FTT (05 Jan 2023 15:11)      Interval history:  Afebrile, no diarrhea.       Allergies:   No Known Allergies      Antimicrobials:  piperacillin/tazobactam IVPB.. 3.375 Gram(s) IV Intermittent every 8 hours      REVIEW OF SYSTEMS:  Unable to obtain due to pt's underlying mental status.       Vital Signs Last 24 Hrs  T(C): 36.9 (01-05-23 @ 16:20), Max: 38.2 (01-04-23 @ 18:17)  T(F): 98.5 (01-05-23 @ 16:20), Max: 100.8 (01-04-23 @ 18:17)  HR: 90 (01-05-23 @ 16:20) (74 - 101)  BP: 135/96 (01-05-23 @ 16:20) (117/87 - 152/99)  BP(mean): --  RR: 18 (01-05-23 @ 16:20) (18 - 18)  SpO2: 100% (01-05-23 @ 16:20) (99% - 100%)      PHYSICAL EXAM:  Pt in no acute distress, awake. not verbal.   + air entry b/l.   non distended abdomen  no edema LE   sacral decubitus, does not appear infected on my exam.   no phlebitis                             12.1   10.21 )-----------( 349      ( 05 Jan 2023 05:36 )             42.1   01-05    154<H>  |  119<H>  |  26<H>  ----------------------------<  109<H>  3.1<L>   |  26  |  0.58    Ca    9.9      05 Jan 2023 05:36  Phos  2.2     01-04  Mg     2.60     01-04    TPro  7.4  /  Alb  3.2<L>  /  TBili  0.4  /  DBili  x   /  AST  17  /  ALT  11  /  AlkPhos  75  01-05      LIVER FUNCTIONS - ( 05 Jan 2023 05:36 )  Alb: 3.2 g/dL / Pro: 7.4 g/dL / ALK PHOS: 75 U/L / ALT: 11 U/L / AST: 17 U/L / GGT: x               Culture - Urine (collected 04 Jan 2023 12:00)  Source: Clean Catch Clean Catch (Midstream)  Final Report (05 Jan 2023 13:13):    <10,000 CFU/mL Normal Urogenital Pauline    Culture - Blood (collected 04 Jan 2023 10:30)  Source: .Blood Blood  Preliminary Report (05 Jan 2023 17:02):    No growth to date.    Culture - Blood (collected 04 Jan 2023 10:15)  Source: .Blood Blood-Peripheral  Preliminary Report (05 Jan 2023 17:02):    No growth to date.        Radiology:    < from: CT Head No Cont (01.04.23 @ 16:48) >  IMPRESSION: No acute intracranial findings.    Thin low-density right-sided convexity subdural collection which may   reflect a chronic subdural hematoma or subdural hygroma. No associated   mass effect, shift of midline structures, or herniation.    Unchanged disproportionate cerebral as well as bilateral caudate atrophy   which likely reflect reflect imaging changes of [place]'s disease.

## 2023-01-05 NOTE — SWALLOW BEDSIDE ASSESSMENT ADULT - SWALLOW EVAL: RECOMMENDED DIET
1- Defer oral diet to MD given limited PO intake. 2- Suggest discussion with patient/family regarding plan of care for nutritional goals of care (oral vs nonoral) 1- Defer oral diet to MD given limited participation/PO intake. 2- Suggest discussion with patient/family regarding plan of care for nutritional goals of care (oral vs nonoral)

## 2023-01-05 NOTE — CONSULT NOTE ADULT - SUBJECTIVE AND OBJECTIVE BOX
Patient seen today. Orders placed in eMAR.  Full note to follow.  Wound with extensive bone exposed, no purulent drainage, no odor. No s/s of acute infection.  No sharp debridement indicated.  Will treat with Dakins wet-to-dry twice day. Wound SURGERY CONSULT NOTE    HPI:  51 yo female with a PMHx of traumatic subdural hemorrhage, arslan's disease, dementia, HTN,  who was BIBEMS from [facility] for failure to thrive. Facility states that for the past 3 days the pt has been spitting out her medications and food, and has significantly decreased her PO intake. Per the facility, the pt is A&Ox0 and nonverbal at baseline. She does occasionally follow commands. On examination here pt was not following commands (04 Jan 2023 10:38)    Wound consult requested to assist w/ management of sacral stage 4 pressure injury. Unable to obtain subjective data patient nonverbal, no family or caregivers at bedside.    Current Diet: Diet, NPO (01-04-23 @ 22:32)      PAST MEDICAL & SURGICAL HISTORY:  History of [place]'s disease  Dementia  HTN (hypertension)  HLD (hyperlipidemia)      No significant past surgical history      REVIEW OF SYSTEMS: Pt unable to offer.    MEDICATIONS  (STANDING):  amLODIPine   Tablet 5 milliGRAM(s) Oral daily  Dakins Solution - 1/4 Strength 1 Application(s) Topical two times a day  dextrose 5%. 1000 milliLiter(s) (100 mL/Hr) IV Continuous <Continuous>  ferrous    sulfate 325 milliGRAM(s) Oral daily  levETIRAcetam  IVPB 500 milliGRAM(s) IV Intermittent every 12 hours  piperacillin/tazobactam IVPB.. 3.375 Gram(s) IV Intermittent every 8 hours  polyethylene glycol 3350 17 Gram(s) Oral two times a day  potassium chloride  10 mEq/100 mL IVPB 10 milliEquivalent(s) IV Intermittent every 1 hour  QUEtiapine 25 milliGRAM(s) Oral three times a day  traZODone 150 milliGRAM(s) Oral daily    MEDICATIONS  (PRN):  acetaminophen  Suppository .. 650 milliGRAM(s) Rectal every 8 hours PRN Temp greater or equal to 38C (100.4F)  melatonin 3 milliGRAM(s) Oral at bedtime PRN Insomnia      Allergies    No Known Allergies    Intolerances      SOCIAL HISTORY:  unable to obtain.  Resident of [facility], bedbound.    FAMILY HISTORY:  No pertinent family history in first degree relatives        PHYSICAL EXAM:  Vital Signs Last 24 Hrs  T(C): 36.4 (05 Jan 2023 19:40), Max: 36.9 (05 Jan 2023 05:20)  T(F): 97.6 (05 Jan 2023 19:40), Max: 98.5 (05 Jan 2023 05:20)  HR: 76 (05 Jan 2023 19:40) (74 - 90)  BP: 114/87 (05 Jan 2023 19:40) (114/87 - 135/96)  BP(mean): --  RR: 18 (05 Jan 2023 19:40) (18 - 18)  SpO2: 99% (05 Jan 2023 19:40) (99% - 100%)    Parameters below as of 05 Jan 2023 19:40  Patient On (Oxygen Delivery Method): room air        Constitutional: NAD, eyes open, severely cachetic, temporal wasting, protruding bony prominences.  (+) low airloss support surface, (+) fluidized positioning devices, (+) complete cair boots  HEENT:  NC/AT, eyes open, mucosa dry.  Cardiovascular: rate regular  Respiratory: nonlabored, equal chest expansion, room air  Gastrointestinal: soft NT/ND, incontinent stool  : incontinent urine  Neurology: (+) dementia, unable to follow commands, nonverbal  functional quadriplegic   Musculoskeletal:  limited, left hand and digits contracted; zuleyka placed in palm of hand to prevent nail trauma to palm, right hand and digits fixed in hyperextension. B/L LE flaccid in extension.  Vascular: BLE equally warm, +2 dp pulses, capillary refill < 3 seconds.  Skin:  moist w/ good turgor  Sacrum stage 4 pressure injury- patient turned to right side- [id number], circumferential undermining ranging from 1cm-3.5cm, with 3.5cm at 9 o'clock. Extensive bone exposed. Scattered areas of purple-maroon discoloration, mixed agranular base and viable muscle. No necrotic tissue. No purulent drainage, no odor. Periwound with purple-maroon discoloration along wound edge from 5 - 7 o'clock, no induration, no increased warmth. Remaining periwound skin no fluctuance, no induration, no associated cellulitis. No s/s of acute skin infection.        LABS/ CULTURES/ RADIOLOGY:                        12.1   10.21 )-----------( 349      ( 05 Jan 2023 05:36 )             42.1       150  |  116  |  19  ----------------------------<  133      [01-05-23 @ 18:23]  3.5   |  25  |  0.44        Ca     9.5     [01-05-23 @ 18:23]      Mg     2.10     [01-05-23 @ 18:23]      Phos  1.9     [01-05-23 @ 18:23]    TPro  7.4  /  Alb  3.2  /  TBili  0.4  /  DBili  x   /  AST  17  /  ALT  11  /  AlkPhos  75  [01-05-23 @ 05:36]      Culture - Blood (collected 01-04-23 @ 10:30)  Source: .Blood Blood  Preliminary Report (01-05-23 @ 17:02):    No growth to date.    Culture - Blood (collected 01-04-23 @ 10:15)  Source: .Blood Blood-Peripheral  Preliminary Report (01-05-23 @ 17:02):    No growth to date.      Culture - Urine (collected 04 Jan 2023 12:00)  Source: Clean Catch Clean Catch (Midstream)  Final Report (05 Jan 2023 13:13):    <10,000 CFU/mL Normal Urogenital Pauline

## 2023-01-05 NOTE — CONSULT NOTE ADULT - NS ATTEND AMEND GEN_ALL_CORE FT
Cachectic 54 yo AA female , non responsive and non ambulatory with large stage 4 sacral decubitus  Potential for healing adversely affected by advanced cachexia  Agree with H&P and recommendations  No need for surgical debridement  Prognosis is guarded

## 2023-01-05 NOTE — PROGRESS NOTE ADULT - ASSESSMENT
51 yo female with a PMHx of traumatic subdural hemorrhage, dementia, HTN,  who was BIMBEMS from [facility] for failure to thrive.     Overall fever, tachycardia, leucocytosis, sepsis, ? source.     PLAN:   c/w zosyn   f/u blood cx, NTD   f/u urine cx negative  trend cbc, for leucocytosis, now resolved.   pending CT chest/abd/pelvis with contrast   sacral decubitus, c/w aggressive wound care, Frequent turning and positioning, Glycemic control, Optimize nutrition.       Radha Ramirez  Please contact through MS Teams   If no response or past 5 pm/weekend call [phone].

## 2023-01-05 NOTE — PROGRESS NOTE ADULT - SUBJECTIVE AND OBJECTIVE BOX
Patient is a 52y old  Female who presents with a chief complaint of FTT (2023 14:35)    Date of servie : 23 @ 15:01  INTERVAL HPI/OVERNIGHT EVENTS:  T(C): 36.9 (23 @ 05:20), Max: 38.2 (23 @ 15:30)  HR: 74 (23 @ 05:20) (74 - 101)  BP: 117/87 (23 @ 05:20) (117/87 - 152/99)  RR: 18 (23 @ 05:20) (18 - 18)  SpO2: 100% (23 @ 05:20) (98% - 100%)  Wt(kg): --  I&O's Summary      LABS:                        12.1   10.21 )-----------( 349      ( 2023 05:36 )             42.1     01-05    154<H>  |  119<H>  |  26<H>  ----------------------------<  109<H>  3.1<L>   |  26  |  0.58    Ca    9.9      2023 05:36  Phos  2.2     -  Mg     2.60     -04    TPro  7.4  /  Alb  3.2<L>  /  TBili  0.4  /  DBili  x   /  AST  17  /  ALT  11  /  AlkPhos  75  01-05    PT/INR - ( 2023 01:00 )   PT: 14.2 sec;   INR: 1.22 ratio         PTT - ( 2023 01:00 )  PTT:27.2 sec  Urinalysis Basic - ( 2023 03:40 )    Color: Yellow / Appearance: Clear / S.033 / pH: x  Gluc: x / Ketone: Negative  / Bili: Negative / Urobili: <2 mg/dL   Blood: x / Protein: 30 mg/dL / Nitrite: Negative   Leuk Esterase: Negative / RBC: 6 /HPF / WBC 4 /HPF   Sq Epi: x / Non Sq Epi: 12 /HPF / Bacteria: Few      CAPILLARY BLOOD GLUCOSE            Urinalysis Basic - ( 2023 03:40 )    Color: Yellow / Appearance: Clear / S.033 / pH: x  Gluc: x / Ketone: Negative  / Bili: Negative / Urobili: <2 mg/dL   Blood: x / Protein: 30 mg/dL / Nitrite: Negative   Leuk Esterase: Negative / RBC: 6 /HPF / WBC 4 /HPF   Sq Epi: x / Non Sq Epi: 12 /HPF / Bacteria: Few        MEDICATIONS  (STANDING):  amLODIPine   Tablet 5 milliGRAM(s) Oral daily  dextrose 5%. 1000 milliLiter(s) (70 mL/Hr) IV Continuous <Continuous>  ferrous    sulfate 325 milliGRAM(s) Oral daily  levETIRAcetam  IVPB 500 milliGRAM(s) IV Intermittent every 12 hours  piperacillin/tazobactam IVPB.. 3.375 Gram(s) IV Intermittent every 8 hours  polyethylene glycol 3350 17 Gram(s) Oral two times a day  potassium chloride  10 mEq/100 mL IVPB 10 milliEquivalent(s) IV Intermittent every 1 hour  QUEtiapine 25 milliGRAM(s) Oral three times a day  traZODone 150 milliGRAM(s) Oral daily    MEDICATIONS  (PRN):  acetaminophen  Suppository .. 650 milliGRAM(s) Rectal every 8 hours PRN Temp greater or equal to 38C (100.4F)  melatonin 3 milliGRAM(s) Oral at bedtime PRN Insomnia          PHYSICAL EXAM:  GENERAL: NAD, well-groomed, well-developed  HEAD:  Atraumatic, Normocephalic  CHEST/LUNG: Clear to percussion bilaterally; No rales, rhonchi, wheezing, or rubs  HEART: Regular rate and rhythm; No murmurs, rubs, or gallops  ABDOMEN: Soft, Nontender, Nondistended; Bowel sounds present  EXTREMITIES:  2+ Peripheral Pulses, No clubbing, cyanosis, or edema  LYMPH: No lymphadenopathy noted  SKIN: No rashes or lesions    Care Discussed with Consultants/Other Providers [ ] YES  [ ] NO Patient is a 52y old  Female who presents with a chief complaint of FTT (2023 14:35)    Date of servie : 23 @ 15:01  INTERVAL HPI/OVERNIGHT EVENTS:  T(C): 36.9 (23 @ 05:20), Max: 38.2 (23 @ 15:30)  HR: 74 (23 @ 05:20) (74 - 101)  BP: 117/87 (23 @ 05:20) (117/87 - 152/99)  RR: 18 (23 @ 05:20) (18 - 18)  SpO2: 100% (23 @ 05:20) (98% - 100%)  Wt(kg): --  I&O's Summary      LABS:                        12.1   10.21 )-----------( 349      ( 2023 05:36 )             42.1     01-05    154<H>  |  119<H>  |  26<H>  ----------------------------<  109<H>  3.1<L>   |  26  |  0.58    Ca    9.9      2023 05:36  Phos  2.2     -  Mg     2.60     -04    TPro  7.4  /  Alb  3.2<L>  /  TBili  0.4  /  DBili  x   /  AST  17  /  ALT  11  /  AlkPhos  75  01-05    PT/INR - ( 2023 01:00 )   PT: 14.2 sec;   INR: 1.22 ratio         PTT - ( 2023 01:00 )  PTT:27.2 sec  Urinalysis Basic - ( 2023 03:40 )    Color: Yellow / Appearance: Clear / S.033 / pH: x  Gluc: x / Ketone: Negative  / Bili: Negative / Urobili: <2 mg/dL   Blood: x / Protein: 30 mg/dL / Nitrite: Negative   Leuk Esterase: Negative / RBC: 6 /HPF / WBC 4 /HPF   Sq Epi: x / Non Sq Epi: 12 /HPF / Bacteria: Few      CAPILLARY BLOOD GLUCOSE            Urinalysis Basic - ( 2023 03:40 )    Color: Yellow / Appearance: Clear / S.033 / pH: x  Gluc: x / Ketone: Negative  / Bili: Negative / Urobili: <2 mg/dL   Blood: x / Protein: 30 mg/dL / Nitrite: Negative   Leuk Esterase: Negative / RBC: 6 /HPF / WBC 4 /HPF   Sq Epi: x / Non Sq Epi: 12 /HPF / Bacteria: Few        MEDICATIONS  (STANDING):  amLODIPine   Tablet 5 milliGRAM(s) Oral daily  dextrose 5%. 1000 milliLiter(s) (70 mL/Hr) IV Continuous <Continuous>  ferrous    sulfate 325 milliGRAM(s) Oral daily  levETIRAcetam  IVPB 500 milliGRAM(s) IV Intermittent every 12 hours  piperacillin/tazobactam IVPB.. 3.375 Gram(s) IV Intermittent every 8 hours  polyethylene glycol 3350 17 Gram(s) Oral two times a day  potassium chloride  10 mEq/100 mL IVPB 10 milliEquivalent(s) IV Intermittent every 1 hour  QUEtiapine 25 milliGRAM(s) Oral three times a day  traZODone 150 milliGRAM(s) Oral daily    MEDICATIONS  (PRN):  acetaminophen  Suppository .. 650 milliGRAM(s) Rectal every 8 hours PRN Temp greater or equal to 38C (100.4F)  melatonin 3 milliGRAM(s) Oral at bedtime PRN Insomnia          PHYSICAL EXAM:  GENERAL: frail  CHEST/LUNG: Clear to percussion bilaterally; No rales, rhonchi, wheezing, or rubs  HEART: Regular rate and rhythm; No murmurs, rubs, or gallops  ABDOMEN: Soft, Nontender, Nondistended; Bowel sounds present  EXTREMITIES:  edema +    Care Discussed with Consultants/Other Providers [ ] YES  [ ] NO

## 2023-01-05 NOTE — PROGRESS NOTE ADULT - SUBJECTIVE AND OBJECTIVE BOX
[facility] NEPHROLOGY- PROGRESS NOTE    52y Female with history of SDH, dementia, HTN presents with FTT. Nephrology consulted for hypernatremia.    REVIEW OF SYSTEMS: Unable to obtain as patient non-verbal.    No Known Allergies      Hospital Medications: Medications reviewed    VITALS:  T(F): 98.5 (23 @ 05:20), Max: 100.8 (23 @ 15:30)  HR: 74 (23 @ 05:20)  BP: 117/87 (23 @ 05:20)  RR: 18 (23 @ 05:20)  SpO2: 100% (23 @ 05:20)  Wt(kg): --        PHYSICAL EXAM:    Gen: NAD, fail and cachectic  Cards: RRR, +S1/S2, no M/G/R  Resp: CTA B/L  GI: soft, NT/ND, NABS  Vascular: no LE edema B/L    LABS:      154<H>  |  119<H>  |  26<H>  ----------------------------<  109<H>  3.1<L>   |  26  |  0.58    Ca    9.9      2023 05:36  Phos  2.2       Mg     2.60         TPro  7.4  /  Alb  3.2<L>  /  TBili  0.4  /  DBili      /  AST  17  /  ALT  11  /  AlkPhos  75  -    Creatinine Trend: 0.58 <--, 0.63 <--, 0.67 <--                        12.1   10.21 )-----------( 349      ( 2023 05:36 )             42.1     Urine Studies:  Urinalysis Basic - ( 2023 03:40 )    Color: Yellow / Appearance: Clear / S.033 / pH:   Gluc:  / Ketone: Negative  / Bili: Negative / Urobili: <2 mg/dL   Blood:  / Protein: 30 mg/dL / Nitrite: Negative   Leuk Esterase: Negative / RBC: 6 /HPF / WBC 4 /HPF   Sq Epi:  / Non Sq Epi: 12 /HPF / Bacteria: Few          RADIOLOGY & ADDITIONAL STUDIES:

## 2023-01-05 NOTE — SWALLOW BEDSIDE ASSESSMENT ADULT - ASR SWALLOW RECOMMEND DIAG
Objective testing NOT warranted at this time given limited PO intake due to oral stage deficits Objective testing NOT warranted at this time given limited PO intake

## 2023-01-05 NOTE — PROGRESS NOTE ADULT - ASSESSMENT
52y Female with history of SDH, dementia, HTN presents with FTT. Nephrology consulted for hypernatremia.    1) Hypernatremia: Due to free water deficit. Serum Na improving. Continue with D5W @ 70 ml/hour. Monitor serum Na and do not correct more than 8-10 meQ/day.    2) JOAQUIN: Secondary to pre-renal azotemia. Scr improving. IVF as above. Avoid nephrotoxins. Monitor electrolytes.    3) HTN: BP improving. Continue with current medications. Monitor BP.    4) FTT: As per primary team.      [facility] NEPHROLOGY  Misha Littlejohn M.D.  Kenneth Woody D.O.  Miim Dey M.D.  Celia Yao, MSN, ANP-C    Telephone: [phone]  Facsimile: [phone]    [address] [place] [id number]

## 2023-01-05 NOTE — SWALLOW BEDSIDE ASSESSMENT ADULT - SWALLOW EVAL: DIAGNOSIS
Limited assessment due to limited PO intake. 1- Moderate to Severe oral stage for puree and moderately thick liquids with patient accepting 1-2 teaspoons of each trial given reduced retrieval of spoon presentation and mild to moderate anterior spillage due to poor labial seal with slowed manipulation and delayed anterior to posterior transport requiring verbal cues to swallow. Patient noted to bite down onto spoon presentation intermittently. 2- Functional pharyngeal stage for puree and moderately thick liquids marked by initiation of pharyngeal swallow and hyolaryngeal excursion upon palpation. Limited assessment due to minimal PO intake. 1- Moderate to Severe oral stage for puree and moderately thick liquids with patient accepting 1-2 teaspoons of each trial given reduced retrieval of spoon presentation and mild to moderate anterior spillage due to poor labial seal with slowed manipulation and delayed anterior to posterior transport requiring verbal cues to swallow. Patient noted to bite down onto spoon presentation intermittently. 2- Functional pharyngeal stage for puree and moderately thick liquids marked by initiation of pharyngeal swallow and hyolaryngeal excursion upon palpation. Limited assessment due to minimal PO intake. 1- Moderate to Severe oral stage for puree and moderately thick liquids characterized by reduced retrieval of spoon presentation with mild to moderate anterior spillage due to poor labial seal with volitional oral holding of the bolus and eventual slowed manipulation and delayed anterior to posterior transport. Patient noted to bite down reflexively/spontaneously onto utensil presentation intermittently. 2- Pharyngeal stage for puree and moderately thick liquids marked by initiation of pharyngeal swallow and hyolaryngeal excursion upon palpation. No overt signs of aspiration/penetration however minimal PO intake therefore aspiration cannot be ruled out. Limited assessment due to minimal PO intake. 1- Moderate to Severe oral stage for puree and moderately thick liquids characterized by reduced retrieval of spoon presentation with mild to moderate anterior spillage due to poor labial seal with volitional oral holding of the bolus with eventual slowed manipulation and delayed anterior to posterior transport with oral clearance noted. Patient noted to bite down reflexively onto utensil presentation intermittently with spontaneous release of utensil. 2- Pharyngeal stage for puree and moderately thick liquids marked by initiation of pharyngeal swallow and hyolaryngeal excursion upon palpation. No overt signs of aspiration/penetration however minimal PO intake therefore aspiration cannot be ruled out.

## 2023-01-05 NOTE — PROGRESS NOTE ADULT - ASSESSMENT
51 yo female with a PMHx of traumatic subdural hemorrhage, dementia, HTN,  who was BIMBEMS from [facility] for failure to thrive. Facility states that for the past 3 days the pt has been spitting out her medications and food, and has significantly decreased her PO intake. Per the facility, the pt is A&Ox0 and nonverbal at baseline. She does occasionally follow commands. On examination here pt was not following commands    1 FTT  - poor po inatke  - calorie count   - will follow recs      2 Fever  - unclear etiology  - fu  blood cultures  - ID fu  - check CT   - ABX as per ID     3 Hypernatremia  - monitor BMP  - IVF     4 HTN  - cw home meds  - DASH diet       51 yo female with a PMHx of traumatic subdural hemorrhage, dementia, HTN,  who was BIMBEMS from [facility] for failure to thrive. Facility states that for the past 3 days the pt has been spitting out her medications and food, and has significantly decreased her PO intake. Per the facility, the pt is A&Ox0 and nonverbal at baseline. She does occasionally follow commands. On examination here pt was not following commands    1 FTT  - poor po inatke  - calorie count   - will follow recs      2 Fever  - unclear etiology  - fu  blood cultures  - ID fu  - check CT   - ABX as per ID     3 Hypernatremia  - monitor BMP  - IVF increased  - renal fu     4 HTN  - cw home meds  - DASH diet

## 2023-01-06 LAB
ANION GAP SERPL CALC-SCNC: 13 MMOL/L — SIGNIFICANT CHANGE UP (ref 7–14)
BASOPHILS # BLD AUTO: 0.01 K/UL — SIGNIFICANT CHANGE UP (ref 0–0.2)
BASOPHILS NFR BLD AUTO: 0.1 % — SIGNIFICANT CHANGE UP (ref 0–2)
BUN SERPL-MCNC: 13 MG/DL — SIGNIFICANT CHANGE UP (ref 7–23)
CALCIUM SERPL-MCNC: 9.6 MG/DL — SIGNIFICANT CHANGE UP (ref 8.4–10.5)
CHLORIDE SERPL-SCNC: 108 MMOL/L — HIGH (ref 98–107)
CO2 SERPL-SCNC: 21 MMOL/L — LOW (ref 22–31)
CREAT SERPL-MCNC: 0.43 MG/DL — LOW (ref 0.5–1.3)
EGFR: 116 ML/MIN/1.73M2 — SIGNIFICANT CHANGE UP
EOSINOPHIL # BLD AUTO: 0.07 K/UL — SIGNIFICANT CHANGE UP (ref 0–0.5)
EOSINOPHIL NFR BLD AUTO: 0.9 % — SIGNIFICANT CHANGE UP (ref 0–6)
GLUCOSE SERPL-MCNC: 96 MG/DL — SIGNIFICANT CHANGE UP (ref 70–99)
HCT VFR BLD CALC: 37.2 % — SIGNIFICANT CHANGE UP (ref 34.5–45)
HGB BLD-MCNC: 11.4 G/DL — LOW (ref 11.5–15.5)
IANC: 6.31 K/UL — SIGNIFICANT CHANGE UP (ref 1.8–7.4)
IMM GRANULOCYTES NFR BLD AUTO: 0.9 % — SIGNIFICANT CHANGE UP (ref 0–0.9)
LYMPHOCYTES # BLD AUTO: 0.62 K/UL — LOW (ref 1–3.3)
LYMPHOCYTES # BLD AUTO: 8.3 % — LOW (ref 13–44)
MAGNESIUM SERPL-MCNC: 2.1 MG/DL — SIGNIFICANT CHANGE UP (ref 1.6–2.6)
MCHC RBC-ENTMCNC: 28.1 PG — SIGNIFICANT CHANGE UP (ref 27–34)
MCHC RBC-ENTMCNC: 30.6 GM/DL — LOW (ref 32–36)
MCV RBC AUTO: 91.9 FL — SIGNIFICANT CHANGE UP (ref 80–100)
MONOCYTES # BLD AUTO: 0.38 K/UL — SIGNIFICANT CHANGE UP (ref 0–0.9)
MONOCYTES NFR BLD AUTO: 5.1 % — SIGNIFICANT CHANGE UP (ref 2–14)
NEUTROPHILS # BLD AUTO: 6.31 K/UL — SIGNIFICANT CHANGE UP (ref 1.8–7.4)
NEUTROPHILS NFR BLD AUTO: 84.7 % — HIGH (ref 43–77)
NRBC # BLD: 0 /100 WBCS — SIGNIFICANT CHANGE UP (ref 0–0)
NRBC # FLD: 0 K/UL — SIGNIFICANT CHANGE UP (ref 0–0)
PHOSPHATE SERPL-MCNC: 2.3 MG/DL — LOW (ref 2.5–4.5)
PLATELET # BLD AUTO: 335 K/UL — SIGNIFICANT CHANGE UP (ref 150–400)
POTASSIUM SERPL-MCNC: 3.9 MMOL/L — SIGNIFICANT CHANGE UP (ref 3.5–5.3)
POTASSIUM SERPL-SCNC: 3.9 MMOL/L — SIGNIFICANT CHANGE UP (ref 3.5–5.3)
RBC # BLD: 4.05 M/UL — SIGNIFICANT CHANGE UP (ref 3.8–5.2)
RBC # FLD: 13.4 % — SIGNIFICANT CHANGE UP (ref 10.3–14.5)
SODIUM SERPL-SCNC: 142 MMOL/L — SIGNIFICANT CHANGE UP (ref 135–145)
WBC # BLD: 7.46 K/UL — SIGNIFICANT CHANGE UP (ref 3.8–10.5)
WBC # FLD AUTO: 7.46 K/UL — SIGNIFICANT CHANGE UP (ref 3.8–10.5)

## 2023-01-06 PROCEDURE — 99232 SBSQ HOSP IP/OBS MODERATE 35: CPT

## 2023-01-06 RX ORDER — MORPHINE SULFATE 50 MG/1
0.5 CAPSULE, EXTENDED RELEASE ORAL ONCE
Refills: 0 | Status: DISCONTINUED | OUTPATIENT
Start: 2023-01-06 | End: 2023-01-10

## 2023-01-06 RX ADMIN — PIPERACILLIN AND TAZOBACTAM 25 GRAM(S): 4; .5 INJECTION, POWDER, LYOPHILIZED, FOR SOLUTION INTRAVENOUS at 05:41

## 2023-01-06 RX ADMIN — Medication 325 MILLIGRAM(S): at 12:41

## 2023-01-06 RX ADMIN — Medication 150 MILLIGRAM(S): at 12:40

## 2023-01-06 RX ADMIN — QUETIAPINE FUMARATE 25 MILLIGRAM(S): 200 TABLET, FILM COATED ORAL at 22:14

## 2023-01-06 RX ADMIN — SODIUM CHLORIDE 100 MILLILITER(S): 9 INJECTION, SOLUTION INTRAVENOUS at 10:46

## 2023-01-06 RX ADMIN — QUETIAPINE FUMARATE 25 MILLIGRAM(S): 200 TABLET, FILM COATED ORAL at 14:33

## 2023-01-06 RX ADMIN — LEVETIRACETAM 400 MILLIGRAM(S): 250 TABLET, FILM COATED ORAL at 18:38

## 2023-01-06 RX ADMIN — Medication 100 MILLIEQUIVALENT(S): at 01:02

## 2023-01-06 RX ADMIN — Medication 1 APPLICATION(S): at 18:39

## 2023-01-06 RX ADMIN — Medication 1 APPLICATION(S): at 05:42

## 2023-01-06 RX ADMIN — LEVETIRACETAM 400 MILLIGRAM(S): 250 TABLET, FILM COATED ORAL at 05:42

## 2023-01-06 RX ADMIN — POLYETHYLENE GLYCOL 3350 17 GRAM(S): 17 POWDER, FOR SOLUTION ORAL at 18:39

## 2023-01-06 RX ADMIN — Medication 100 MILLIEQUIVALENT(S): at 02:02

## 2023-01-06 RX ADMIN — PIPERACILLIN AND TAZOBACTAM 25 GRAM(S): 4; .5 INJECTION, POWDER, LYOPHILIZED, FOR SOLUTION INTRAVENOUS at 14:34

## 2023-01-06 RX ADMIN — PIPERACILLIN AND TAZOBACTAM 25 GRAM(S): 4; .5 INJECTION, POWDER, LYOPHILIZED, FOR SOLUTION INTRAVENOUS at 22:15

## 2023-01-06 NOTE — PROGRESS NOTE ADULT - ASSESSMENT
51 yo female with a PMHx of traumatic subdural hemorrhage, dementia, HTN,  who was BIMBEMS from [facility] for failure to thrive. Facility states that for the past 3 days the pt has been spitting out her medications and food, and has significantly decreased her PO intake. Per the facility, the pt is A&Ox0 and nonverbal at baseline. She does occasionally follow commands. On examination here pt was not following commands    1 FTT  - poor po inatke  - calorie count   - will follow recs      2 Fever  - unclear etiology  - fu  blood cultures  - ID fu  - ABX as per ID     3 Hypernatremia  - monitor BMP  - IVF increased  - renal fu     4 HTN  - cw home meds  - DASH diet

## 2023-01-06 NOTE — ADVANCED PRACTICE NURSE CONSULT - ASSESSMENT
General: Patient alert, nonverbal, unable to follow commands, bedbound, frail, cachectic, incontinent of urine and stool- perineal care provided for urinary incontinence. Skin warm, dry. Knees contracted to chest.     Sacrum stage 4 pressure injury- patient turned to left side measuring- [id number], circumferential undermining ranging from 1cm-2.5cm, with 2.5cm at 5o'clock. Tissue type presenting with 60%  bone, 20% deep purple maroon discoloration of wound base 10% muscle and 10% pink moist agranular. Small serosanguinous drainage, no odor. Periwound with purple-maroon discoloration along wound edge. No induration, no increased warmth. Remaining periwound skin intact. No s/s of soft tissue infection at this time. Goals of care: Supportive care- wound not expected to improve at this time due to poor nutritional status, protect periwound skin, monitor for tissue type changes, continue to offload, antimicrobial management.

## 2023-01-06 NOTE — PROGRESS NOTE ADULT - SUBJECTIVE AND OBJECTIVE BOX
[facility] NEPHROLOGY- PROGRESS NOTE    52y Female with history of SDH, dementia, HTN presents with FTT. Nephrology consulted for hypernatremia.    REVIEW OF SYSTEMS: Unable to obtain as patient non-verbal.    No Known Allergies      Hospital Medications: Medications reviewed      VITALS:  T(F): 97.8 (23 @ 05:30), Max: 98.5 (23 @ 16:20)  HR: 83 (23 @ 05:30)  BP: 119/71 (23 @ 05:30)  RR: 17 (23 @ 05:30)  SpO2: 100% (23 @ 05:30)  Wt(kg): --        PHYSICAL EXAM:    Gen: NAD, fail and cachectic  Cards: RRR, +S1/S2, no M/G/R  Resp: CTA B/L  GI: soft, NT/ND, NABS  Vascular: no LE edema B/L      LABS:      142  |  108<H>  |  13  ----------------------------<  96  3.9   |  21<L>  |  0.43<L>    Ca    9.6      2023 06:48  Phos  2.3       Mg     2.10         TPro  7.4  /  Alb  3.2<L>  /  TBili  0.4  /  DBili      /  AST  17  /  ALT  11  /  AlkPhos  75      Creatinine Trend: 0.43 <--, 0.44 <--, 0.58 <--, 0.63 <--, 0.67 <--                        11.4   7.46  )-----------( 335      ( 2023 06:48 )             37.2     Urine Studies:  Urinalysis Basic - ( 2023 03:40 )    Color: Yellow / Appearance: Clear / S.033 / pH:   Gluc:  / Ketone: Negative  / Bili: Negative / Urobili: <2 mg/dL   Blood:  / Protein: 30 mg/dL / Nitrite: Negative   Leuk Esterase: Negative / RBC: 6 /HPF / WBC 4 /HPF   Sq Epi:  / Non Sq Epi: 12 /HPF / Bacteria: Few

## 2023-01-06 NOTE — PROGRESS NOTE ADULT - SUBJECTIVE AND OBJECTIVE BOX
[facility]      Seen earlier today [Please note that date of entry above  is the actual  DATE OF SERVICE] No new neurological symptoms reported. Remains stable neurologically.   - Today, patient is without complaints.          *****MEDICATIONS: Current medication reviewed and documented.    MEDICATIONS  (STANDING):  amLODIPine   Tablet 5 milliGRAM(s) Oral daily  Dakins Solution - 1/4 Strength 1 Application(s) Topical two times a day  ferrous    sulfate 325 milliGRAM(s) Oral daily  levETIRAcetam  IVPB 500 milliGRAM(s) IV Intermittent every 12 hours  morphine  - Injectable 0.5 milliGRAM(s) IV Push once  piperacillin/tazobactam IVPB.. 3.375 Gram(s) IV Intermittent every 8 hours  polyethylene glycol 3350 17 Gram(s) Oral two times a day  QUEtiapine 25 milliGRAM(s) Oral three times a day  traZODone 150 milliGRAM(s) Oral daily    MEDICATIONS  (PRN):  acetaminophen  Suppository .. 650 milliGRAM(s) Rectal every 8 hours PRN Temp greater or equal to 38C (100.4F)  melatonin 3 milliGRAM(s) Oral at bedtime PRN Insomnia          ***** VITAL SIGNS:   Vital Signs Last 24 Hrs       T(F): 97.7 (23 @ 13:30), Max: 98.5 (23 @ 16:20)  HR: 71 (23 @ 13:30) (71 - 90)  BP: 120/73 (23 @ 13:30) (114/87 - 135/96)  RR: 16 (23 @ 13:30) (16 - 18)  SpO2: 99% (23 @ 13:30) (99% - 100%)  Wt(kg): --  I&O's Summary           *****PHYSICAL EXAM:   alert tracking   mute  does attempt to raise left arm to command   otherwise does not follow any other commands            *****LAB AND IMAGIN.4   7.46  )-----------( 335      ( 2023 06:48 )             37.2               -    142  |  108<H>  |  13  ----------------------------<  96  3.9   |  21<L>  |  0.43<L>    Ca    9.6      2023 06:48  Phos  2.3     -  Mg     2.10     -    TPro  7.4  /  Alb  3.2<L>  /  TBili  0.4  /  DBili  x   /  AST  17  /  ALT  11  /  AlkPhos  75  -                         [All pertinent recent Imaging/Reports reviewed]            *****A S S E S S M E N T   A N D   P L A N :   Excerpt from H&P,"     53 yo female with a PMHx of traumatic subdural hemorrhage, dementia, HTN,  who was BIMBEMS from [facility] for failure to thrive. Facility states that for the past 3 days the pt has been spitting out her medications and food, and has significantly decreased her PO intake. Per the facility, the pt is A&Ox0 and nonverbal at baseline. She does occasionally follow commands. On examination here pt was not following commands    cachectic, emaciated   called for hypernatremia      Problem/Recommendations 1: metabolic encephalopathy worsening underlyig  cognitive impairment  ( as per chart dx of huntingtons, dementia and traumatic subdural)     as per chart at baseline    ct head to r/o any acute process    goc discussion      s/s eval   overall prognosis is poor          pt at risk for developing delirium, therefore please institute the following preventative measures if possible          - initiating early mobilization          -minimizing "tethers" - IV, oxygen, catheters, etc          -avoiding   sedatives          -maintaining hydration/nutrition          -avoid anticholinergics - diphenhydramine, etc          -pain control    Problem/Recommendations 2: hypernatremia   correction ongoing   defer to nephrology   resolved      Problem/Recommendations 3: prerenal state   correction ongoing   resolved      Thank you for allowing me to participate in the care of this patient. Will continue to follow patient periodically. Please do not hesitate to call me if you have any  questions or if there has been a change in patients neurological status     ________________  Mayra Gonzalez MD  [facility] (PN)[facility]  [phone]      33 minutes spent on total encounter; more than 50 % of the visit was  spent counseling about plan of care, compliance to diet/exercise and medication regimen and or  coordinating care by the attending physician.      It is advised that stroke patients follow up with NP Natalie Rogerse @ [phone] in 1- 2 weeks.   Others please follow up with Dr. Michael Nissenbaum [phone]

## 2023-01-06 NOTE — CHART NOTE - NSCHARTNOTEFT_GEN_A_CORE
as per attg, Ok to Place a diet with aspirations precautions as per attg, Ok to Place a diet with aspirations precautions.  01-06    142  |  108<H>  |  13  ----------------------------<  96  3.9   |  21<L>  |  0.43<L>    Ca    9.6      06 Jan 2023 06:48  Phos  2.3     01-06  Mg     2.10     01-06  Sodium Level was discussed with nephrology, Ok to DC D5 Gtt, Continue to monitor Na level

## 2023-01-06 NOTE — ADVANCED PRACTICE NURSE CONSULT - RECOMMEDATIONS
Topical recommendations in chart under order set.     Recommend use of external female urinary  for urinary containment.     Continue low air loss bed therapy,  heel elevation with offloading boots, turn & reposition q2h with Z-flow fluidized pillow, continue moisture management  & single breathable pad, continue measures to decrease friction/shear.     Please contact Wound/Ostomy Care Service Line if we can be of further assistance (ext [phone]).

## 2023-01-06 NOTE — PROGRESS NOTE ADULT - ASSESSMENT
53 yo female with a PMHx of traumatic subdural hemorrhage, dementia, HTN,  who was BIMBEMS from [facility] for failure to thrive.     Overall fever, tachycardia, leucocytosis, sepsis, ? source.   resolved sepsis,       PLAN:   c/w zosyn   f/u blood cx, NTD   f/u urine cx negative  trend cbc, for leucocytosis, now resolved.   awaiting CT chest/abd/pelvis with contrast to elucidate source of infection.   sacral decubitus, c/w aggressive wound care, Frequent turning and positioning, Glycemic control, Optimize nutrition.       Radha Ramirez  Please contact through MS Teams   If no response or past 5 pm/weekend call [phone].

## 2023-01-06 NOTE — PROGRESS NOTE ADULT - SUBJECTIVE AND OBJECTIVE BOX
Patient is a 52y old  Female who presents with a chief complaint of FTT (06 Jan 2023 14:37)    Date of servie : 01-06-23 @ 15:43  INTERVAL HPI/OVERNIGHT EVENTS:  T(C): 36.5 (01-06-23 @ 13:30), Max: 36.9 (01-05-23 @ 16:20)  HR: 71 (01-06-23 @ 13:30) (71 - 90)  BP: 120/73 (01-06-23 @ 13:30) (114/87 - 135/96)  RR: 16 (01-06-23 @ 13:30) (16 - 18)  SpO2: 99% (01-06-23 @ 13:30) (99% - 100%)  Wt(kg): --  I&O's Summary      LABS:                        11.4   7.46  )-----------( 335      ( 06 Jan 2023 06:48 )             37.2     01-06    142  |  108<H>  |  13  ----------------------------<  96  3.9   |  21<L>  |  0.43<L>    Ca    9.6      06 Jan 2023 06:48  Phos  2.3     01-06  Mg     2.10     01-06    TPro  7.4  /  Alb  3.2<L>  /  TBili  0.4  /  DBili  x   /  AST  17  /  ALT  11  /  AlkPhos  75  01-05        CAPILLARY BLOOD GLUCOSE                MEDICATIONS  (STANDING):  amLODIPine   Tablet 5 milliGRAM(s) Oral daily  Dakins Solution - 1/4 Strength 1 Application(s) Topical two times a day  ferrous    sulfate 325 milliGRAM(s) Oral daily  levETIRAcetam  IVPB 500 milliGRAM(s) IV Intermittent every 12 hours  morphine  - Injectable 0.5 milliGRAM(s) IV Push once  piperacillin/tazobactam IVPB.. 3.375 Gram(s) IV Intermittent every 8 hours  polyethylene glycol 3350 17 Gram(s) Oral two times a day  QUEtiapine 25 milliGRAM(s) Oral three times a day  traZODone 150 milliGRAM(s) Oral daily    MEDICATIONS  (PRN):  acetaminophen  Suppository .. 650 milliGRAM(s) Rectal every 8 hours PRN Temp greater or equal to 38C (100.4F)  melatonin 3 milliGRAM(s) Oral at bedtime PRN Insomnia          PHYSICAL EXAM:  GENERAL: NAD, well-groomed, well-developed  HEAD:  Atraumatic, Normocephalic  CHEST/LUNG: Clear to percussion bilaterally; No rales, rhonchi, wheezing, or rubs  HEART: Regular rate and rhythm; No murmurs, rubs, or gallops  ABDOMEN: Soft, Nontender, Nondistended; Bowel sounds present  EXTREMITIES:  2+ Peripheral Pulses, No clubbing, cyanosis, or edema  LYMPH: No lymphadenopathy noted  SKIN: No rashes or lesions    Care Discussed with Consultants/Other Providers [ ] YES  [ ] NO

## 2023-01-06 NOTE — PROGRESS NOTE ADULT - SUBJECTIVE AND OBJECTIVE BOX
52yPatient is a 52y old  Female who presents with a chief complaint of FTT (06 Jan 2023 15:43)      Interval history:  Afebrile, not verbal.       Allergies:   No Known Allergies    Antimicrobials:  piperacillin/tazobactam IVPB.. 3.375 Gram(s) IV Intermittent every 8 hours      REVIEW OF SYSTEMS:  Unable to obtain due to pt's underlying mental status.       Vital Signs Last 24 Hrs  T(C): 36.5 (01-06-23 @ 13:30), Max: 36.6 (01-06-23 @ 05:30)  T(F): 97.7 (01-06-23 @ 13:30), Max: 97.8 (01-06-23 @ 05:30)  HR: 71 (01-06-23 @ 13:30) (71 - 83)  BP: 120/73 (01-06-23 @ 13:30) (114/87 - 126/86)  BP(mean): --  RR: 16 (01-06-23 @ 13:30) (16 - 18)  SpO2: 99% (01-06-23 @ 13:30) (99% - 100%)      PHYSICAL EXAM:  Pt in no acute distress, awake. not verbal.   + air entry b/l.   non distended abdomen  no edema LE   sacral decubitus, does not appear infected on my exam.   no phlebitis                               11.4   7.46  )-----------( 335      ( 06 Jan 2023 06:48 )             37.2   01-06    142  |  108<H>  |  13  ----------------------------<  96  3.9   |  21<L>  |  0.43<L>    Ca    9.6      06 Jan 2023 06:48  Phos  2.3     01-06  Mg     2.10     01-06    TPro  7.4  /  Alb  3.2<L>  /  TBili  0.4  /  DBili  x   /  AST  17  /  ALT  11  /  AlkPhos  75  01-05      LIVER FUNCTIONS - ( 05 Jan 2023 05:36 )  Alb: 3.2 g/dL / Pro: 7.4 g/dL / ALK PHOS: 75 U/L / ALT: 11 U/L / AST: 17 U/L / GGT: x               Culture - Urine (collected 04 Jan 2023 12:00)  Source: Clean Catch Clean Catch (Midstream)  Final Report (05 Jan 2023 13:13):    <10,000 CFU/mL Normal Urogenital Pauline    Culture - Blood (collected 04 Jan 2023 10:30)  Source: .Blood Blood  Preliminary Report (05 Jan 2023 17:02):    No growth to date.    Culture - Blood (collected 04 Jan 2023 10:15)  Source: .Blood Blood-Peripheral  Preliminary Report (05 Jan 2023 17:02):    No growth to date.

## 2023-01-06 NOTE — ADVANCED PRACTICE NURSE CONSULT - REASON FOR CONSULT
Patient seen on skin care rounds for assessment of skin impairment. Chart reviewed: WBC 7.46, H/H 11.4/37.2, platelets 335, Serum albumin 3.2, Aashish 10. Patient H/O of traumatic subdural hemorrhage, arslan's disease, dementia, HTN,  who was BIBEMS from [facility] for failure to thrive. Patient seen by Nephrology and Neurology for hypernatremia, Infectious disease for leukocytosis and Wound care nurse practitioner for sacral wound- topical recommendations in the chart.

## 2023-01-07 LAB
ANION GAP SERPL CALC-SCNC: 11 MMOL/L — SIGNIFICANT CHANGE UP (ref 7–14)
BASOPHILS # BLD AUTO: 0.03 K/UL — SIGNIFICANT CHANGE UP (ref 0–0.2)
BASOPHILS NFR BLD AUTO: 0.4 % — SIGNIFICANT CHANGE UP (ref 0–2)
BUN SERPL-MCNC: 9 MG/DL — SIGNIFICANT CHANGE UP (ref 7–23)
CALCIUM SERPL-MCNC: 9.2 MG/DL — SIGNIFICANT CHANGE UP (ref 8.4–10.5)
CHLORIDE SERPL-SCNC: 100 MMOL/L — SIGNIFICANT CHANGE UP (ref 98–107)
CO2 SERPL-SCNC: 27 MMOL/L — SIGNIFICANT CHANGE UP (ref 22–31)
CREAT SERPL-MCNC: 0.46 MG/DL — LOW (ref 0.5–1.3)
EGFR: 114 ML/MIN/1.73M2 — SIGNIFICANT CHANGE UP
EOSINOPHIL # BLD AUTO: 0.1 K/UL — SIGNIFICANT CHANGE UP (ref 0–0.5)
EOSINOPHIL NFR BLD AUTO: 1.4 % — SIGNIFICANT CHANGE UP (ref 0–6)
GLUCOSE SERPL-MCNC: 176 MG/DL — HIGH (ref 70–99)
HCT VFR BLD CALC: 38.7 % — SIGNIFICANT CHANGE UP (ref 34.5–45)
HGB BLD-MCNC: 12 G/DL — SIGNIFICANT CHANGE UP (ref 11.5–15.5)
IANC: 5.62 K/UL — SIGNIFICANT CHANGE UP (ref 1.8–7.4)
IMM GRANULOCYTES NFR BLD AUTO: 0.7 % — SIGNIFICANT CHANGE UP (ref 0–0.9)
LYMPHOCYTES # BLD AUTO: 0.82 K/UL — LOW (ref 1–3.3)
LYMPHOCYTES # BLD AUTO: 11.6 % — LOW (ref 13–44)
MAGNESIUM SERPL-MCNC: 2 MG/DL — SIGNIFICANT CHANGE UP (ref 1.6–2.6)
MCHC RBC-ENTMCNC: 28 PG — SIGNIFICANT CHANGE UP (ref 27–34)
MCHC RBC-ENTMCNC: 31 GM/DL — LOW (ref 32–36)
MCV RBC AUTO: 90.2 FL — SIGNIFICANT CHANGE UP (ref 80–100)
MONOCYTES # BLD AUTO: 0.42 K/UL — SIGNIFICANT CHANGE UP (ref 0–0.9)
MONOCYTES NFR BLD AUTO: 6 % — SIGNIFICANT CHANGE UP (ref 2–14)
MRSA PCR RESULT.: SIGNIFICANT CHANGE UP
NEUTROPHILS # BLD AUTO: 5.62 K/UL — SIGNIFICANT CHANGE UP (ref 1.8–7.4)
NEUTROPHILS NFR BLD AUTO: 79.9 % — HIGH (ref 43–77)
NRBC # BLD: 0 /100 WBCS — SIGNIFICANT CHANGE UP (ref 0–0)
NRBC # FLD: 0 K/UL — SIGNIFICANT CHANGE UP (ref 0–0)
PHOSPHATE SERPL-MCNC: 3.1 MG/DL — SIGNIFICANT CHANGE UP (ref 2.5–4.5)
PLATELET # BLD AUTO: 413 K/UL — HIGH (ref 150–400)
POTASSIUM SERPL-MCNC: 3.3 MMOL/L — LOW (ref 3.5–5.3)
POTASSIUM SERPL-SCNC: 3.3 MMOL/L — LOW (ref 3.5–5.3)
RBC # BLD: 4.29 M/UL — SIGNIFICANT CHANGE UP (ref 3.8–5.2)
RBC # FLD: 13.5 % — SIGNIFICANT CHANGE UP (ref 10.3–14.5)
S AUREUS DNA NOSE QL NAA+PROBE: SIGNIFICANT CHANGE UP
SODIUM SERPL-SCNC: 138 MMOL/L — SIGNIFICANT CHANGE UP (ref 135–145)
WBC # BLD: 7.04 K/UL — SIGNIFICANT CHANGE UP (ref 3.8–10.5)
WBC # FLD AUTO: 7.04 K/UL — SIGNIFICANT CHANGE UP (ref 3.8–10.5)

## 2023-01-07 PROCEDURE — 71260 CT THORAX DX C+: CPT | Mod: 26

## 2023-01-07 PROCEDURE — 74177 CT ABD & PELVIS W/CONTRAST: CPT | Mod: 26

## 2023-01-07 RX ORDER — POTASSIUM CHLORIDE 20 MEQ
10 PACKET (EA) ORAL
Refills: 0 | Status: COMPLETED | OUTPATIENT
Start: 2023-01-07 | End: 2023-01-07

## 2023-01-07 RX ORDER — CHLORHEXIDINE GLUCONATE 213 G/1000ML
1 SOLUTION TOPICAL DAILY
Refills: 0 | Status: DISCONTINUED | OUTPATIENT
Start: 2023-01-07 | End: 2023-02-02

## 2023-01-07 RX ADMIN — LEVETIRACETAM 400 MILLIGRAM(S): 250 TABLET, FILM COATED ORAL at 05:56

## 2023-01-07 RX ADMIN — QUETIAPINE FUMARATE 25 MILLIGRAM(S): 200 TABLET, FILM COATED ORAL at 21:31

## 2023-01-07 RX ADMIN — Medication 325 MILLIGRAM(S): at 12:14

## 2023-01-07 RX ADMIN — QUETIAPINE FUMARATE 25 MILLIGRAM(S): 200 TABLET, FILM COATED ORAL at 13:21

## 2023-01-07 RX ADMIN — PIPERACILLIN AND TAZOBACTAM 25 GRAM(S): 4; .5 INJECTION, POWDER, LYOPHILIZED, FOR SOLUTION INTRAVENOUS at 13:20

## 2023-01-07 RX ADMIN — Medication 1 APPLICATION(S): at 06:45

## 2023-01-07 RX ADMIN — QUETIAPINE FUMARATE 25 MILLIGRAM(S): 200 TABLET, FILM COATED ORAL at 05:50

## 2023-01-07 RX ADMIN — POLYETHYLENE GLYCOL 3350 17 GRAM(S): 17 POWDER, FOR SOLUTION ORAL at 06:00

## 2023-01-07 RX ADMIN — Medication 1 APPLICATION(S): at 18:01

## 2023-01-07 RX ADMIN — CHLORHEXIDINE GLUCONATE 1 APPLICATION(S): 213 SOLUTION TOPICAL at 12:14

## 2023-01-07 RX ADMIN — Medication 150 MILLIGRAM(S): at 12:13

## 2023-01-07 RX ADMIN — PIPERACILLIN AND TAZOBACTAM 25 GRAM(S): 4; .5 INJECTION, POWDER, LYOPHILIZED, FOR SOLUTION INTRAVENOUS at 06:01

## 2023-01-07 RX ADMIN — POLYETHYLENE GLYCOL 3350 17 GRAM(S): 17 POWDER, FOR SOLUTION ORAL at 18:04

## 2023-01-07 RX ADMIN — PIPERACILLIN AND TAZOBACTAM 25 GRAM(S): 4; .5 INJECTION, POWDER, LYOPHILIZED, FOR SOLUTION INTRAVENOUS at 21:34

## 2023-01-07 RX ADMIN — LEVETIRACETAM 400 MILLIGRAM(S): 250 TABLET, FILM COATED ORAL at 18:04

## 2023-01-07 RX ADMIN — Medication 100 MILLIEQUIVALENT(S): at 14:28

## 2023-01-07 RX ADMIN — AMLODIPINE BESYLATE 5 MILLIGRAM(S): 2.5 TABLET ORAL at 05:50

## 2023-01-07 RX ADMIN — Medication 100 MILLIEQUIVALENT(S): at 12:12

## 2023-01-07 RX ADMIN — Medication 100 MILLIEQUIVALENT(S): at 13:14

## 2023-01-07 NOTE — PROGRESS NOTE ADULT - SUBJECTIVE AND OBJECTIVE BOX
[facility] NEPHROLOGY- PROGRESS NOTE    52y Female with history of SDH, dementia, HTN presents with FTT. Nephrology consulted for hypernatremia.    REVIEW OF SYSTEMS: Unable to obtain as patient non-verbal.    No Known Allergies      Hospital Medications: Medications reviewed      VITALS:  T(F): 97.4 (23 @ 20:59), Max: 97.9 (23 @ 17:45)  HR: 97 (23 @ 20:59)  BP: 118/66 (23 @ 20:59)  RR: 16 (23 @ 20:59)  SpO2: 100% (23 @ 20:59)      PHYSICAL EXAM:  Gen: NAD, fail and cachectic  Cards: RRR, +S1/S2, no M/G/R  Resp: CTA B/L  GI: soft, NT/ND, NABS  Vascular: no LE edema B/L            LABS:      138  |  100  |  9   ----------------------------<  176<H>  3.3<L>   |  27  |  0.46<L>    Ca    9.2      2023 06:19  Phos  3.1       Mg     2.00           Creatinine Trend: 0.46 <--, 0.43 <--, 0.44 <--, 0.58 <--, 0.63 <--, 0.67 <--                        12.0   7.04  )-----------( 413      ( 2023 06:19 )             38.7     Urine Studies:  Urinalysis Basic - ( 2023 03:40 )    Color: Yellow / Appearance: Clear / S.033 / pH:   Gluc:  / Ketone: Negative  / Bili: Negative / Urobili: <2 mg/dL   Blood:  / Protein: 30 mg/dL / Nitrite: Negative   Leuk Esterase: Negative / RBC: 6 /HPF / WBC 4 /HPF   Sq Epi:  / Non Sq Epi: 12 /HPF / Bacteria: Few

## 2023-01-07 NOTE — PROGRESS NOTE ADULT - ASSESSMENT
52y Female with history of SDH, dementia, HTN presents with FTT. Nephrology consulted for hypernatremia.    1) Hypernatremia: Resolved with rapid correction on D5W @ 70 ml/hour for which IVF discontinued yesterday.  Na+ is stable. Hold IVF and encourage oral diet.   Would maintain serum Na at current level and avoid further lowering. Monitor serum Na.    2) JOAQUIN: Secondary to pre-renal azotemia. JOAQUIN resolved. Avoid nephrotoxins. Monitor electrolytes.    3) HTN: BP controlled. Continue with current medications. Monitor BP.    4) FTT: As per primary team.      [facility] NEPHROLOGY  Misha Littlejohn M.D.  Kenneth Woody D.O.  Mimi Dey M.D.  Celia Yao, MSN, ANP-C    Telephone: [phone]  Facsimile: [phone]    [address] [place] [address]

## 2023-01-07 NOTE — PROGRESS NOTE ADULT - ASSESSMENT
51 yo female with a PMHx of traumatic subdural hemorrhage, dementia, HTN,  who was BIMBEMS from [facility] for failure to thrive. Facility states that for the past 3 days the pt has been spitting out her medications and food, and has significantly decreased her PO intake. Per the facility, the pt is A&Ox0 and nonverbal at baseline. She does occasionally follow commands. On examination here pt was not following commands    1 FTT  - poor po inatke  - calorie count   - will follow recs      2 Fever  - unclear etiology  - fu  blood cultures  - ID fu  - ABX as per ID     3 Hypernatremia  - monitor BMP  - IVF increased  - renal fu     4 HTN  - cw home meds  - DASH diet

## 2023-01-07 NOTE — PROGRESS NOTE ADULT - SUBJECTIVE AND OBJECTIVE BOX
Patient is a 52y old  Female who presents with a chief complaint of FTT (06 Jan 2023 17:13)    Date of servie : 01-07-23 @ 11:49  INTERVAL HPI/OVERNIGHT EVENTS:  T(C): 36.4 (01-07-23 @ 05:48), Max: 36.5 (01-06-23 @ 13:30)  HR: 96 (01-07-23 @ 05:48) (71 - 100)  BP: 121/88 (01-07-23 @ 05:48) (120/73 - 137/94)  RR: 16 (01-07-23 @ 05:48) (16 - 17)  SpO2: 100% (01-07-23 @ 05:48) (99% - 100%)  Wt(kg): --  I&O's Summary      LABS:                        12.0   7.04  )-----------( 413      ( 07 Jan 2023 06:19 )             38.7     01-07    138  |  100  |  9   ----------------------------<  176<H>  3.3<L>   |  27  |  0.46<L>    Ca    9.2      07 Jan 2023 06:19  Phos  3.1     01-07  Mg     2.00     01-07          CAPILLARY BLOOD GLUCOSE                MEDICATIONS  (STANDING):  amLODIPine   Tablet 5 milliGRAM(s) Oral daily  chlorhexidine 2% Cloths 1 Application(s) Topical daily  Dakins Solution - 1/4 Strength 1 Application(s) Topical two times a day  ferrous    sulfate 325 milliGRAM(s) Oral daily  levETIRAcetam  IVPB 500 milliGRAM(s) IV Intermittent every 12 hours  morphine  - Injectable 0.5 milliGRAM(s) IV Push once  piperacillin/tazobactam IVPB.. 3.375 Gram(s) IV Intermittent every 8 hours  polyethylene glycol 3350 17 Gram(s) Oral two times a day  potassium chloride  10 mEq/100 mL IVPB 10 milliEquivalent(s) IV Intermittent every 1 hour  QUEtiapine 25 milliGRAM(s) Oral three times a day  traZODone 150 milliGRAM(s) Oral daily    MEDICATIONS  (PRN):  acetaminophen  Suppository .. 650 milliGRAM(s) Rectal every 8 hours PRN Temp greater or equal to 38C (100.4F)  melatonin 3 milliGRAM(s) Oral at bedtime PRN Insomnia          PHYSICAL EXAM:  GENERAL: frail  CHEST/LUNG: Clear to percussion bilaterally; No rales, rhonchi, wheezing, or rubs  HEART: Regular rate and rhythm; No murmurs, rubs, or gallops  ABDOMEN: Soft, Nontender, Nondistended; Bowel sounds present  EXTREMITIES:  edema +    Care Discussed with Consultants/Other Providers [ ] YES  [ ] NO

## 2023-01-08 ENCOUNTER — TRANSCRIPTION ENCOUNTER (OUTPATIENT)
Age: 53
End: 2023-01-08

## 2023-01-08 LAB
ANION GAP SERPL CALC-SCNC: 9 MMOL/L — SIGNIFICANT CHANGE UP (ref 7–14)
BASOPHILS # BLD AUTO: 0.01 K/UL — SIGNIFICANT CHANGE UP (ref 0–0.2)
BASOPHILS NFR BLD AUTO: 0.1 % — SIGNIFICANT CHANGE UP (ref 0–2)
BUN SERPL-MCNC: 9 MG/DL — SIGNIFICANT CHANGE UP (ref 7–23)
CALCIUM SERPL-MCNC: 9.1 MG/DL — SIGNIFICANT CHANGE UP (ref 8.4–10.5)
CHLORIDE SERPL-SCNC: 106 MMOL/L — SIGNIFICANT CHANGE UP (ref 98–107)
CO2 SERPL-SCNC: 26 MMOL/L — SIGNIFICANT CHANGE UP (ref 22–31)
CREAT SERPL-MCNC: 0.42 MG/DL — LOW (ref 0.5–1.3)
EGFR: 117 ML/MIN/1.73M2 — SIGNIFICANT CHANGE UP
EOSINOPHIL # BLD AUTO: 0.09 K/UL — SIGNIFICANT CHANGE UP (ref 0–0.5)
EOSINOPHIL NFR BLD AUTO: 1.1 % — SIGNIFICANT CHANGE UP (ref 0–6)
GLUCOSE SERPL-MCNC: 82 MG/DL — SIGNIFICANT CHANGE UP (ref 70–99)
HCT VFR BLD CALC: 36.3 % — SIGNIFICANT CHANGE UP (ref 34.5–45)
HGB BLD-MCNC: 11.3 G/DL — LOW (ref 11.5–15.5)
IANC: 6.19 K/UL — SIGNIFICANT CHANGE UP (ref 1.8–7.4)
IMM GRANULOCYTES NFR BLD AUTO: 0.9 % — SIGNIFICANT CHANGE UP (ref 0–0.9)
LYMPHOCYTES # BLD AUTO: 1.07 K/UL — SIGNIFICANT CHANGE UP (ref 1–3.3)
LYMPHOCYTES # BLD AUTO: 13.4 % — SIGNIFICANT CHANGE UP (ref 13–44)
MAGNESIUM SERPL-MCNC: 2 MG/DL — SIGNIFICANT CHANGE UP (ref 1.6–2.6)
MCHC RBC-ENTMCNC: 27.8 PG — SIGNIFICANT CHANGE UP (ref 27–34)
MCHC RBC-ENTMCNC: 31.1 GM/DL — LOW (ref 32–36)
MCV RBC AUTO: 89.4 FL — SIGNIFICANT CHANGE UP (ref 80–100)
MONOCYTES # BLD AUTO: 0.54 K/UL — SIGNIFICANT CHANGE UP (ref 0–0.9)
MONOCYTES NFR BLD AUTO: 6.8 % — SIGNIFICANT CHANGE UP (ref 2–14)
NEUTROPHILS # BLD AUTO: 6.19 K/UL — SIGNIFICANT CHANGE UP (ref 1.8–7.4)
NEUTROPHILS NFR BLD AUTO: 77.7 % — HIGH (ref 43–77)
NRBC # BLD: 0 /100 WBCS — SIGNIFICANT CHANGE UP (ref 0–0)
NRBC # FLD: 0 K/UL — SIGNIFICANT CHANGE UP (ref 0–0)
PHOSPHATE SERPL-MCNC: 2.8 MG/DL — SIGNIFICANT CHANGE UP (ref 2.5–4.5)
PLATELET # BLD AUTO: 425 K/UL — HIGH (ref 150–400)
POTASSIUM SERPL-MCNC: 4.2 MMOL/L — SIGNIFICANT CHANGE UP (ref 3.5–5.3)
POTASSIUM SERPL-SCNC: 4.2 MMOL/L — SIGNIFICANT CHANGE UP (ref 3.5–5.3)
RBC # BLD: 4.06 M/UL — SIGNIFICANT CHANGE UP (ref 3.8–5.2)
RBC # FLD: 13.5 % — SIGNIFICANT CHANGE UP (ref 10.3–14.5)
SARS-COV-2 RNA SPEC QL NAA+PROBE: SIGNIFICANT CHANGE UP
SODIUM SERPL-SCNC: 141 MMOL/L — SIGNIFICANT CHANGE UP (ref 135–145)
WBC # BLD: 7.97 K/UL — SIGNIFICANT CHANGE UP (ref 3.8–10.5)
WBC # FLD AUTO: 7.97 K/UL — SIGNIFICANT CHANGE UP (ref 3.8–10.5)

## 2023-01-08 RX ADMIN — CHLORHEXIDINE GLUCONATE 1 APPLICATION(S): 213 SOLUTION TOPICAL at 11:47

## 2023-01-08 RX ADMIN — POLYETHYLENE GLYCOL 3350 17 GRAM(S): 17 POWDER, FOR SOLUTION ORAL at 17:36

## 2023-01-08 RX ADMIN — PIPERACILLIN AND TAZOBACTAM 25 GRAM(S): 4; .5 INJECTION, POWDER, LYOPHILIZED, FOR SOLUTION INTRAVENOUS at 14:23

## 2023-01-08 RX ADMIN — Medication 325 MILLIGRAM(S): at 11:47

## 2023-01-08 RX ADMIN — Medication 150 MILLIGRAM(S): at 11:47

## 2023-01-08 RX ADMIN — LEVETIRACETAM 400 MILLIGRAM(S): 250 TABLET, FILM COATED ORAL at 17:36

## 2023-01-08 RX ADMIN — QUETIAPINE FUMARATE 25 MILLIGRAM(S): 200 TABLET, FILM COATED ORAL at 05:22

## 2023-01-08 RX ADMIN — Medication 1 APPLICATION(S): at 05:22

## 2023-01-08 RX ADMIN — AMLODIPINE BESYLATE 5 MILLIGRAM(S): 2.5 TABLET ORAL at 05:20

## 2023-01-08 RX ADMIN — QUETIAPINE FUMARATE 25 MILLIGRAM(S): 200 TABLET, FILM COATED ORAL at 21:45

## 2023-01-08 RX ADMIN — QUETIAPINE FUMARATE 25 MILLIGRAM(S): 200 TABLET, FILM COATED ORAL at 14:19

## 2023-01-08 RX ADMIN — Medication 1 APPLICATION(S): at 17:36

## 2023-01-08 RX ADMIN — PIPERACILLIN AND TAZOBACTAM 25 GRAM(S): 4; .5 INJECTION, POWDER, LYOPHILIZED, FOR SOLUTION INTRAVENOUS at 21:45

## 2023-01-08 RX ADMIN — LEVETIRACETAM 400 MILLIGRAM(S): 250 TABLET, FILM COATED ORAL at 05:13

## 2023-01-08 RX ADMIN — PIPERACILLIN AND TAZOBACTAM 25 GRAM(S): 4; .5 INJECTION, POWDER, LYOPHILIZED, FOR SOLUTION INTRAVENOUS at 05:18

## 2023-01-08 RX ADMIN — POLYETHYLENE GLYCOL 3350 17 GRAM(S): 17 POWDER, FOR SOLUTION ORAL at 05:19

## 2023-01-08 NOTE — DISCHARGE NOTE PROVIDER - HOSPITAL COURSE
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 Patient has been hospitalized multiple times for breakthrough seizures over the past year. Admitted for further management.    Prior documentation reports a hx of a cardiac arrest in July 2022-- tried to clarify this with the daughter on 1/2. She says he was in the ICU at [facility] after having seizures and then was in a coma on a ventilator. Its possible she means he was in status epilepticus and required intubation.      Problem/Plan - 1:  ·  Problem: Seizure.   ·  Plan: - Witness focal right sided seizurex2 prior to presentation. Patient has hx of seizures and prior [facility] visits for the same in the past.  - CT with possible left caudate acute infarction.   - vEEG 12/31: There is evidence of mild-moderate diffuse/multifocal cerebral dysfunction, non-specific as to etiology.   No epileptic discharges recorded. No seizures recorded.  - neurology consulted: recs appreciated   - plan to taper valproic acid 250 mg q12h x4 days, then 250 mg qhs x4 days, then stop  -continue levetiracetam 750 mg bid - adjust to max dose for CrCl if renal function changes - plan to discharge on this medication  -MRI brain w/o contrast, with epilepsy protocol completed on 1/4: There is evidence of a prominent area of abnormal susceptibility seen involving the inferior left posterior frontal/parietal cortical and subcortical region. This finding is suspicious for an area of old hemorrhage. NO acute infarct. Old lacunar infarcts are seen involving the left corona radiata/centrum semiovale region.  -refer to epilepsy clinic upon discharge.     Problem/Plan - 2:  ·  Problem: Acute kidney injury superimposed on CKD.   ·  Plan: - Cr 1.99 (baseline seems to be 1.7-1.9) in HIE. CKD likely diabetic nephropathy.   - Creatinine stable, around 2.26  - patient also may have component of BRIANDA given received iodinated contrast during code stroke  - kidney/bladder US negative  - FeNa 0.9% (Pre-renal)  - avoid nephrotoxic agents.  - cr downtrended to 1.9.     Problem/Plan - 3:  ·  Problem: Hyperkalemia.   ·  Plan: - s/p lokelma, insulin/D50 and calcium gluconate in ED  - EKG w/o T wave changes   - ensure has BMs. s/p BM today   - low k diet  - repeat K 4.7.     Problem/Plan - 4:  ·  Problem: Acute respiratory failure with hypercapnia.   ·  Plan: - VBG with pH 63 ->56 and pH 7.2, CT with moderate right pleural effusion  - patient likely has component of undiagnosed OHS  - TTE uninterpreted due to poor windows   - consider repeat study if needed  - now off oxygen.     Problem/Plan - 5:  ·  Problem: Atrial fibrillation.   ·  Plan: - PAF. now in SR on tele   - CHADSVASC 6  - c/w eliquis 5mg BID  - c/w home lopressor BID for rate control.     Problem/Plan - 6:  ·  Problem: CAD (coronary artery disease).   ·  Plan: - s/p CABG. TTE  9/2022 technically difficult study  - on ASA at home  - c/w ASA here  - consider repeat tte if needed.     Problem/Plan - 7:  ·  Problem: DM (diabetes mellitus).   ·  Plan: - A1c 5.1 (9/2022); A1c 12/29 5.6   - also with bilateral partial foot amputations due to uncontrolled diabetes.   - on Glargine 40u and Lispro 10u TID at home  - Per son, he helps patient inject insulin, but states has not been giving him insulin recently as FS have been low.   - low ISS here for now with controlled FS.     Problem/Plan - 8:  ·  Problem: Anemia.   ·  Plan: - Hgb 10.4. Baseline 9-10  - no active signs of bleeding.     Problem/Plan - 9:  ·  Problem: Hyperlipidemia.   ·  Plan: - on ezetemibe and lipitor at home  - ezetimibe not available here  - c/w lipitor.     Problem/Plan - 10:  ·  Problem: Hypertension.   ·  Plan; - on lopressor 50mg BID   - will continue here with hold parameters.  - c/w Nifedipine to 90 mg QD.     Problem/Plan - 11:  ·  Problem: Infestation by bed bug.   ·  Plan: patient reportedly had a bed bug found by nurse in ED  - patient decontaminated.   - Per infection control, d/c isolation as bed bug has been removed, and no further found.     Problem/Plan - 12:  ·  Problem: Prophylactic measure.   ·  Plan: DVT ppx: Eliquis  Diet: CC evening snack.   Dispo: home   daughter Nandini Cazares updated plan for home with HHA in AM  1/8. 52F PMH of SDH, dementia, HTN, BIBEMS from [facility] for failure to thrive.     FTT  - neuro following, outpatient follow up  - CT head: negative  - S&S, cine --> c/w pureed diet w/ thin liquids  - palliative consulted, DNR/DNI, no feeding tube    Fever  - unclear etiology  - BCx negative, UCx negative  - leukocytosis, now resolved   - CT C/A/P w/ groundglass opacities in LLL, nonspecific and possibly infectious, chronic sacral OM  - ID consulted: s/p Zosyn (1/4-1/13)    Hypernatremia  - resolved  - s/p IVF    Case discussed with Dr. Singh on ___. Patient is medically stable and optimized for discharge as per attending. All medications were reviewed and prescriptions were sent to a mutually agreed upon pharmacy. Discharge plan reviewed with patient and/or family. 52F PMH of SDH, dementia, HTN, BIBEMS from [facility] for failure to thrive.     FTT  - neuro following, outpatient follow up  - CT head: negative  - S&S, cine --> c/w pureed diet w/ thin liquids  - palliative consulted, DNR/DNI, no feeding tube    Fever  - unclear etiology  - BCx negative, UCx negative  - leukocytosis, now resolved   - CT C/A/P w/ groundglass opacities in LLL, nonspecific and possibly infectious, chronic sacral OM  - ID consulted: s/p Zosyn (1/4-1/13)    Hypernatremia  - resolved  - s/p IVF    Case discussed with Dr. Singh on 2/2/2023. Patient is medically stable and optimized for discharge as per attending. All medications were reviewed and prescriptions were sent to a mutually agreed upon pharmacy. Discharge plan reviewed with patient and/or family.

## 2023-01-08 NOTE — DISCHARGE NOTE PROVIDER - NSDCMRMEDTOKEN_GEN_ALL_CORE_FT
amLODIPine 5 mg oral tablet: 1 tab(s) orally once a day  ascorbic acid 500 mg oral tablet: 1 tab(s) orally once a day  Daily Chuy oral tablet: 1 tab(s) orally once a day  ferrous sulfate 325 mg (65 mg elemental iron) oral tablet: 1 tab(s) orally once a day  lactulose 10 g/15 mL oral solution: 15 milliliter(s) orally 2 times a day, As Needed  Melatonin 3 mg oral tablet: 1 tab(s) orally once a day (at bedtime), As Needed  Milk of Magnesia 8% oral suspension: 30 milliliter(s) orally once a day (at bedtime), As Needed  polyethylene glycol 3350 oral powder for reconstitution: 17 gram(s) orally 2 times a day  Probiotic Formula: 1 tab(s) orally 2 times a day  senna leaf extract oral tablet: 2 tab(s) orally 2 times a day  traZODone 150 mg oral tablet: 1 tab(s) orally once a day  zinc sulfate 220 mg oral capsule: 1 cap(s) orally once a day   amLODIPine 5 mg oral tablet: 1 tab(s) orally once a day  ascorbic acid 500 mg oral tablet: 1 tab(s) orally once a day  ferrous sulfate 325 mg (65 mg elemental iron) oral tablet: 1 tab(s) orally once a day  Melatonin 3 mg oral tablet: 1 tab(s) orally once a day (at bedtime), As Needed  Milk of Magnesia 8% oral suspension: 30 milliliter(s) orally once a day (at bedtime), As Needed  Multiple Vitamins oral tablet: 1 tab(s) orally once a day  polyethylene glycol 3350 oral powder for reconstitution: 17 gram(s) orally 2 times a day  traZODone 150 mg oral tablet: 1 tab(s) orally once a day   acetaminophen 325 mg oral tablet: 2 tab(s) orally every 6 hours, As needed, Temp greater or equal to 38C (100.4F), Mild Pain (1 - 3), Moderate Pain (4 - 6)  amLODIPine 5 mg oral tablet: 1 tab(s) orally once a day  ascorbic acid 500 mg oral tablet: 1 tab(s) orally once a day  chlorhexidine 2% topical pad: 1 application topically once a day  collagenase 250 units/g topical ointment: 1 application topically once a day  ferrous sulfate 325 mg (65 mg elemental iron) oral tablet: 1 tab(s) orally once a day  levETIRAcetam 500 mg oral tablet: 1 tab(s) orally 2 times a day  Melatonin 3 mg oral tablet: 1 tab(s) orally once a day (at bedtime), As Needed  Multiple Vitamins oral tablet: 1 tab(s) orally once a day  polyethylene glycol 3350 oral powder for reconstitution: 17 gram(s) orally 2 times a day  QUEtiapine 25 mg oral tablet: 1 tab(s) orally 3 times a day  sodium hypochlorite 0.125% topical solution: 1 application topically 2 times a day  traZODone 150 mg oral tablet: 1 tab(s) orally once a day

## 2023-01-08 NOTE — DISCHARGE NOTE PROVIDER - DETAILS OF MALNUTRITION DIAGNOSIS/DIAGNOSES
This patient has been assessed with a concern for Malnutrition and was treated during this hospitalization for the following Nutrition diagnosis/diagnoses:     -  01/11/2023: Severe protein-calorie malnutrition

## 2023-01-08 NOTE — PROGRESS NOTE ADULT - SUBJECTIVE AND OBJECTIVE BOX
[facility] NEPHROLOGY- PROGRESS NOTE    52y Female with history of SDH, dementia, HTN presents with FTT. Nephrology consulted for hypernatremia.    REVIEW OF SYSTEMS: Unable to obtain as patient non-verbal.    No Known Allergies      Hospital Medications: Medications reviewed    VITALS:  T(F): 97.9 (23 @ 13:40), Max: 98.1 (23 @ 05:38)  HR: 99 (23 @ 13:40)  BP: 118/77 (23 @ 13:40)  RR: 17 (23 @ 13:40)  SpO2: 100% (23 @ 13:40)      PHYSICAL EXAM:  Gen: NAD, fail and cachectic  Cards: RRR, +S1/S2, no M/G/R  Resp: CTA B/L  GI: soft, NT/ND, NABS  Vascular: no LE edema B/L      LABS:    141 <--, 138 <--, 142 <--, 150 <--, 154 <--, 159 <--, 155 <--  141  |  106  |  9   ----------------------------<  82  4.2   |  26  |  0.42<L>    Ca    9.1      2023 05:56  Phos  2.8       Mg     2.00           Creatinine Trend: 0.42 <--, 0.46 <--, 0.43 <--, 0.44 <--, 0.58 <--, 0.63 <--, 0.67 <--                        11.3   7.97  )-----------( 425      ( 2023 05:56 )             36.3     Urine Studies:  Urinalysis Basic - ( 2023 03:40 )    Color: Yellow / Appearance: Clear / S.033 / pH:   Gluc:  / Ketone: Negative  / Bili: Negative / Urobili: <2 mg/dL   Blood:  / Protein: 30 mg/dL / Nitrite: Negative   Leuk Esterase: Negative / RBC: 6 /HPF / WBC 4 /HPF   Sq Epi:  / Non Sq Epi: 12 /HPF / Bacteria: Few

## 2023-01-08 NOTE — PROGRESS NOTE ADULT - ASSESSMENT
52y Female with history of SDH, dementia, HTN presents with FTT. Nephrology consulted for hypernatremia.    1) Hypernatremia: Resolved with rapid correction on D5W @ 70 ml/hour for which IVF discontinued 1/6.  Na+ is stable. Hold IVF and encourage oral diet.   Would maintain serum Na at current level and avoid further lowering. Monitor serum Na.    2) JOAQUIN: Secondary to pre-renal azotemia. JOAQUIN resolved. Avoid nephrotoxins. Monitor electrolytes.    3) HTN: BP controlled. Continue with current medications. Monitor BP.    4) FTT: As per primary team.      [facility] NEPHROLOGY  Misha Littlejohn M.D.  Kenneth Woody D.O.  Mimi Dey M.D.  Celia Yao, MSN, ANP-C    Telephone: [phone]  Facsimile: [phone]    [address] [place] [address]

## 2023-01-08 NOTE — PROGRESS NOTE ADULT - SUBJECTIVE AND OBJECTIVE BOX
Patient is a 52y old  Female who presents with a chief complaint of FTT (07 Jan 2023 21:51)    Date of servie : 01-08-23 @ 11:34  INTERVAL HPI/OVERNIGHT EVENTS:  T(C): 36.6 (01-08-23 @ 09:40), Max: 36.7 (01-08-23 @ 05:38)  HR: 94 (01-08-23 @ 09:40) (85 - 98)  BP: 109/82 (01-08-23 @ 09:40) (109/82 - 146/83)  RR: 17 (01-08-23 @ 09:40) (16 - 19)  SpO2: 100% (01-08-23 @ 09:40) (99% - 100%)  Wt(kg): --  I&O's Summary      LABS:                        11.3   7.97  )-----------( 425      ( 08 Jan 2023 05:56 )             36.3     01-08    141  |  106  |  9   ----------------------------<  82  4.2   |  26  |  0.42<L>    Ca    9.1      08 Jan 2023 05:56  Phos  2.8     01-08  Mg     2.00     01-08          CAPILLARY BLOOD GLUCOSE                MEDICATIONS  (STANDING):  amLODIPine   Tablet 5 milliGRAM(s) Oral daily  chlorhexidine 2% Cloths 1 Application(s) Topical daily  Dakins Solution - 1/4 Strength 1 Application(s) Topical two times a day  ferrous    sulfate 325 milliGRAM(s) Oral daily  levETIRAcetam  IVPB 500 milliGRAM(s) IV Intermittent every 12 hours  morphine  - Injectable 0.5 milliGRAM(s) IV Push once  piperacillin/tazobactam IVPB.. 3.375 Gram(s) IV Intermittent every 8 hours  polyethylene glycol 3350 17 Gram(s) Oral two times a day  QUEtiapine 25 milliGRAM(s) Oral three times a day  traZODone 150 milliGRAM(s) Oral daily    MEDICATIONS  (PRN):  acetaminophen  Suppository .. 650 milliGRAM(s) Rectal every 8 hours PRN Temp greater or equal to 38C (100.4F)  melatonin 3 milliGRAM(s) Oral at bedtime PRN Insomnia          PHYSICAL EXAM:  GENERAL: NAD, well-groomed, well-developed  HEAD:  Atraumatic, Normocephalic  CHEST/LUNG: Clear to percussion bilaterally; No rales, rhonchi, wheezing, or rubs  HEART: Regular rate and rhythm; No murmurs, rubs, or gallops  ABDOMEN: Soft, Nontender, Nondistended; Bowel sounds present  EXTREMITIES:  2+ Peripheral Pulses, No clubbing, cyanosis, or edema  LYMPH: No lymphadenopathy noted  SKIN: No rashes or lesions    Care Discussed with Consultants/Other Providers [ ] YES  [ ] NO

## 2023-01-08 NOTE — PROGRESS NOTE ADULT - ASSESSMENT
53 yo female with a PMHx of traumatic subdural hemorrhage, dementia, HTN,  who was BIMBEMS from [facility] for failure to thrive. Facility states that for the past 3 days the pt has been spitting out her medications and food, and has significantly decreased her PO intake. Per the facility, the pt is A&Ox0 and nonverbal at baseline. She does occasionally follow commands. On examination here pt was not following commands    1 FTT  - poor po inatke  - calorie count   - will follow recs      2 Fever  - unclear etiology  - fu  blood cultures  - ID fu  - ABX as per ID     3 Hypernatremia  - monitor BMP  - IVF increased  - renal fu     4 HTN  - cw home meds  - DASH diet

## 2023-01-08 NOTE — DISCHARGE NOTE PROVIDER - NSDCFUADDAPPT_GEN_ALL_CORE_FT
Upon discharge f/u as outpatient at [facility] [place] [address] [facility] [phone]

## 2023-01-08 NOTE — DISCHARGE NOTE PROVIDER - NSDCCPCAREPLAN_GEN_ALL_CORE_FT
PRINCIPAL DISCHARGE DIAGNOSIS  Diagnosis: Adult failure to thrive  Assessment and Plan of Treatment: Encourage eating and drinking by mouth.  Will need assistance with eating at the nursing home.      SECONDARY DISCHARGE DIAGNOSES  Diagnosis: Hypernatremia  Assessment and Plan of Treatment: Improved.  Make sure to get adequate hydration.    Diagnosis: HTN (hypertension)  Assessment and Plan of Treatment: Low sodium and fat diet, continue anti-hypertensive medications, and follow up with primary care physician.     PRINCIPAL DISCHARGE DIAGNOSIS  Diagnosis: Adult failure to thrive  Assessment and Plan of Treatment: Encourage eating and drinking by mouth. Continue pureed diet. Will need assistance with eating.      SECONDARY DISCHARGE DIAGNOSES  Diagnosis: Hypernatremia  Assessment and Plan of Treatment: Improved. Ensure adequate hydration.    Diagnosis: HTN (hypertension)  Assessment and Plan of Treatment: Low sodium and fat diet, continue anti-hypertensive medications, and follow up with primary care physician.

## 2023-01-09 LAB
ANION GAP SERPL CALC-SCNC: 9 MMOL/L — SIGNIFICANT CHANGE UP (ref 7–14)
BASOPHILS # BLD AUTO: 0.02 K/UL — SIGNIFICANT CHANGE UP (ref 0–0.2)
BASOPHILS NFR BLD AUTO: 0.3 % — SIGNIFICANT CHANGE UP (ref 0–2)
BUN SERPL-MCNC: 10 MG/DL — SIGNIFICANT CHANGE UP (ref 7–23)
CALCIUM SERPL-MCNC: 9.1 MG/DL — SIGNIFICANT CHANGE UP (ref 8.4–10.5)
CHLORIDE SERPL-SCNC: 105 MMOL/L — SIGNIFICANT CHANGE UP (ref 98–107)
CO2 SERPL-SCNC: 24 MMOL/L — SIGNIFICANT CHANGE UP (ref 22–31)
CREAT SERPL-MCNC: 0.44 MG/DL — LOW (ref 0.5–1.3)
CULTURE RESULTS: SIGNIFICANT CHANGE UP
CULTURE RESULTS: SIGNIFICANT CHANGE UP
EGFR: 116 ML/MIN/1.73M2 — SIGNIFICANT CHANGE UP
EOSINOPHIL # BLD AUTO: 0.11 K/UL — SIGNIFICANT CHANGE UP (ref 0–0.5)
EOSINOPHIL NFR BLD AUTO: 1.5 % — SIGNIFICANT CHANGE UP (ref 0–6)
GLUCOSE SERPL-MCNC: 90 MG/DL — SIGNIFICANT CHANGE UP (ref 70–99)
HCT VFR BLD CALC: 35.4 % — SIGNIFICANT CHANGE UP (ref 34.5–45)
HGB BLD-MCNC: 11.1 G/DL — LOW (ref 11.5–15.5)
IANC: 5.68 K/UL — SIGNIFICANT CHANGE UP (ref 1.8–7.4)
IMM GRANULOCYTES NFR BLD AUTO: 1.1 % — HIGH (ref 0–0.9)
LYMPHOCYTES # BLD AUTO: 0.95 K/UL — LOW (ref 1–3.3)
LYMPHOCYTES # BLD AUTO: 12.9 % — LOW (ref 13–44)
MAGNESIUM SERPL-MCNC: 1.9 MG/DL — SIGNIFICANT CHANGE UP (ref 1.6–2.6)
MCHC RBC-ENTMCNC: 27.8 PG — SIGNIFICANT CHANGE UP (ref 27–34)
MCHC RBC-ENTMCNC: 31.4 GM/DL — LOW (ref 32–36)
MCV RBC AUTO: 88.5 FL — SIGNIFICANT CHANGE UP (ref 80–100)
MONOCYTES # BLD AUTO: 0.53 K/UL — SIGNIFICANT CHANGE UP (ref 0–0.9)
MONOCYTES NFR BLD AUTO: 7.2 % — SIGNIFICANT CHANGE UP (ref 2–14)
NEUTROPHILS # BLD AUTO: 5.68 K/UL — SIGNIFICANT CHANGE UP (ref 1.8–7.4)
NEUTROPHILS NFR BLD AUTO: 77 % — SIGNIFICANT CHANGE UP (ref 43–77)
NRBC # BLD: 0 /100 WBCS — SIGNIFICANT CHANGE UP (ref 0–0)
NRBC # FLD: 0 K/UL — SIGNIFICANT CHANGE UP (ref 0–0)
PHOSPHATE SERPL-MCNC: 2.5 MG/DL — SIGNIFICANT CHANGE UP (ref 2.5–4.5)
PLATELET # BLD AUTO: 494 K/UL — HIGH (ref 150–400)
POTASSIUM SERPL-MCNC: 3.5 MMOL/L — SIGNIFICANT CHANGE UP (ref 3.5–5.3)
POTASSIUM SERPL-SCNC: 3.5 MMOL/L — SIGNIFICANT CHANGE UP (ref 3.5–5.3)
RBC # BLD: 4 M/UL — SIGNIFICANT CHANGE UP (ref 3.8–5.2)
RBC # FLD: 13.5 % — SIGNIFICANT CHANGE UP (ref 10.3–14.5)
SODIUM SERPL-SCNC: 138 MMOL/L — SIGNIFICANT CHANGE UP (ref 135–145)
SPECIMEN SOURCE: SIGNIFICANT CHANGE UP
SPECIMEN SOURCE: SIGNIFICANT CHANGE UP
WBC # BLD: 7.37 K/UL — SIGNIFICANT CHANGE UP (ref 3.8–10.5)
WBC # FLD AUTO: 7.37 K/UL — SIGNIFICANT CHANGE UP (ref 3.8–10.5)

## 2023-01-09 PROCEDURE — 99232 SBSQ HOSP IP/OBS MODERATE 35: CPT

## 2023-01-09 RX ADMIN — PIPERACILLIN AND TAZOBACTAM 25 GRAM(S): 4; .5 INJECTION, POWDER, LYOPHILIZED, FOR SOLUTION INTRAVENOUS at 14:24

## 2023-01-09 RX ADMIN — LEVETIRACETAM 400 MILLIGRAM(S): 250 TABLET, FILM COATED ORAL at 18:28

## 2023-01-09 RX ADMIN — LEVETIRACETAM 400 MILLIGRAM(S): 250 TABLET, FILM COATED ORAL at 05:01

## 2023-01-09 RX ADMIN — QUETIAPINE FUMARATE 25 MILLIGRAM(S): 200 TABLET, FILM COATED ORAL at 15:23

## 2023-01-09 RX ADMIN — POLYETHYLENE GLYCOL 3350 17 GRAM(S): 17 POWDER, FOR SOLUTION ORAL at 05:01

## 2023-01-09 RX ADMIN — Medication 325 MILLIGRAM(S): at 11:55

## 2023-01-09 RX ADMIN — Medication 1 APPLICATION(S): at 05:00

## 2023-01-09 RX ADMIN — PIPERACILLIN AND TAZOBACTAM 25 GRAM(S): 4; .5 INJECTION, POWDER, LYOPHILIZED, FOR SOLUTION INTRAVENOUS at 21:34

## 2023-01-09 RX ADMIN — PIPERACILLIN AND TAZOBACTAM 25 GRAM(S): 4; .5 INJECTION, POWDER, LYOPHILIZED, FOR SOLUTION INTRAVENOUS at 05:16

## 2023-01-09 RX ADMIN — Medication 1 APPLICATION(S): at 18:28

## 2023-01-09 RX ADMIN — Medication 150 MILLIGRAM(S): at 11:55

## 2023-01-09 RX ADMIN — AMLODIPINE BESYLATE 5 MILLIGRAM(S): 2.5 TABLET ORAL at 05:00

## 2023-01-09 RX ADMIN — POLYETHYLENE GLYCOL 3350 17 GRAM(S): 17 POWDER, FOR SOLUTION ORAL at 18:28

## 2023-01-09 RX ADMIN — QUETIAPINE FUMARATE 25 MILLIGRAM(S): 200 TABLET, FILM COATED ORAL at 05:01

## 2023-01-09 RX ADMIN — CHLORHEXIDINE GLUCONATE 1 APPLICATION(S): 213 SOLUTION TOPICAL at 11:55

## 2023-01-09 RX ADMIN — QUETIAPINE FUMARATE 25 MILLIGRAM(S): 200 TABLET, FILM COATED ORAL at 21:35

## 2023-01-09 NOTE — PROGRESS NOTE ADULT - ASSESSMENT
53 yo female with a PMHx of traumatic subdural hemorrhage, dementia, HTN,  who was BIMBEMS from [facility] for failure to thrive. Facility states that for the past 3 days the pt has been spitting out her medications and food, and has significantly decreased her PO intake. Per the facility, the pt is A&Ox0 and nonverbal at baseline. She does occasionally follow commands. On examination here pt was not following commands    1 FTT  - poor po inatke  - calorie count   - will follow recs      2 Fever  - unclear etiology  - fu  blood cultures  - ID fu  - ABX as per ID     3 Hypernatremia  - monitor BMP  - IVF increased  - renal fu     4 HTN  - cw home meds  - DASH diet

## 2023-01-09 NOTE — PROGRESS NOTE ADULT - SUBJECTIVE AND OBJECTIVE BOX
[facility] NEPHROLOGY- PROGRESS NOTE    52y Female with history of SDH, dementia, HTN presents with FTT. Nephrology consulted for hypernatremia.    REVIEW OF SYSTEMS: Unable to obtain as patient non-verbal.    No Known Allergies      Hospital Medications: Medications reviewed      VITALS:  T(F): 98 (23 @ 11:42), Max: 98.8 (23 @ 04:56)  HR: 104 (23 @ 11:42)  BP: 137/90 (23 @ 11:42)  RR: 18 (23 @ 11:42)  SpO2: 97% (23 @ 11:42)  Wt(kg): --     @ 07:01  -   @ 07:00  --------------------------------------------------------  IN: 200 mL / OUT: 0 mL / NET: 200 mL          PHYSICAL EXAM:    Gen: NAD, fail and cachectic  Cards: RRR, +S1/S2, no M/G/R  Resp: CTA B/L  GI: soft, NT/ND, NABS  Vascular: no LE edema B/L      LABS:      138  |  105  |  10  ----------------------------<  90  3.5   |  24  |  0.44<L>    Ca    9.1      2023 05:25  Phos  2.5       Mg     1.90           Creatinine Trend: 0.44 <--, 0.42 <--, 0.46 <--, 0.43 <--, 0.44 <--, 0.58 <--, 0.63 <--, 0.67 <--                        11.1   7.37  )-----------( 494      ( 2023 05:25 )             35.4     Urine Studies:  Urinalysis Basic - ( 2023 03:40 )    Color: Yellow / Appearance: Clear / S.033 / pH:   Gluc:  / Ketone: Negative  / Bili: Negative / Urobili: <2 mg/dL   Blood:  / Protein: 30 mg/dL / Nitrite: Negative   Leuk Esterase: Negative / RBC: 6 /HPF / WBC 4 /HPF   Sq Epi:  / Non Sq Epi: 12 /HPF / Bacteria: Few

## 2023-01-09 NOTE — PROGRESS NOTE ADULT - SUBJECTIVE AND OBJECTIVE BOX
52yPatient is a 52y old  Female who presents with a chief complaint of FTT (09 Jan 2023 13:46)      Interval history:  Afebrile, not verbal, awake,       Allergies:   No Known Allergies    Antimicrobials:    piperacillin/tazobactam IVPB.. 3.375 Gram(s) IV Intermittent every 8 hours    REVIEW OF SYSTEMS:  Unable to obtain due to pt's underlying mental status.       Vital Signs Last 24 Hrs  T(C): 36.7 (01-09-23 @ 20:58), Max: 37.1 (01-09-23 @ 04:56)  T(F): 98 (01-09-23 @ 20:58), Max: 98.8 (01-09-23 @ 04:56)  HR: 99 (01-09-23 @ 20:58) (95 - 104)  BP: 132/95 (01-09-23 @ 20:58) (111/82 - 137/90)  BP(mean): --  RR: 18 (01-09-23 @ 20:58) (18 - 19)  SpO2: 98% (01-09-23 @ 20:58) (97% - 100%)    PHYSICAL EXAM:  Pt in no acute distress, awake. not verbal.   + air entry b/l.   non distended abdomen  no edema LE   sacral decubitus, does not appear infected on my exam.   no phlebitis                             11.1   7.37  )-----------( 494      ( 09 Jan 2023 05:25 )             35.4   01-09    138  |  105  |  10  ----------------------------<  90  3.5   |  24  |  0.44<L>    Ca    9.1      09 Jan 2023 05:25  Phos  2.5     01-09  Mg     1.90     01-09            Radiology:    < from: CT Abdomen and Pelvis w/ IV Cont (01.07.23 @ 11:47) >  IMPRESSION:  Sacral decubitus ulcer is increased in size compared with prior imaging   dated 4/20/2022. Increased sacral sclerosis with foci of erosion   consistent with chronic osteomyelitis.    Air extends from the level of the decubitus into the spinal canal. No   associated fluid collection.    Mild pneumomediastinum, etiology unclear. No mediastinal fluid or   inflammation.

## 2023-01-09 NOTE — PROGRESS NOTE ADULT - ASSESSMENT
51 yo female with a PMHx of traumatic subdural hemorrhage, dementia, HTN,  who was BIMBEMS from [facility] for failure to thrive.     Overall fever, tachycardia, leucocytosis, sepsis, ? source.   resolved sepsis,       PLAN:   c/w zosyn,   will plan for 10 days of abx total, day 6/10 of therapy today   f/u blood cx, NTD   f/u urine cx negative  trend cbc, for leucocytosis, now resolved.   CT chest/abd/pelvis with sacral decubitus with underlying chronic OM  role of abx very limited in setting of chronic OM in absence of surgery    c/w aggressive wound care, Frequent turning and positioning, Glycemic control, Optimize nutrition.   can transition to po cefpodoxime and flagyl when ready for discharge.       Radha Ramirez  Please contact through MS Teams   If no response or past 5 pm/weekend call [phone].

## 2023-01-09 NOTE — PROGRESS NOTE ADULT - ASSESSMENT
52y Female with history of SDH, dementia, HTN presents with FTT. Nephrology consulted for hypernatremia.    1) Hypernatremia: Resolved s/p D5W. Encourage PO intake to thirst. Monitor serum Na.    2) JOAQUIN: Secondary to pre-renal azotemia. JOAQUIN resolved. Avoid nephrotoxins. Monitor electrolytes.    3) HTN: BP controlled. Continue with current medications. Monitor BP.    4) FTT: As per primary team.    Will sign off. Please call with questions.      [facility] NEPHROLOGY  Misha Littlejohn M.D.  Kenneth Woody D.O.  Mimi Dey M.D.  Celia Yao, MSN, ANP-C    Telephone: [phone]  Facsimile: [phone]    [address] [place] [address]

## 2023-01-09 NOTE — PROGRESS NOTE ADULT - SUBJECTIVE AND OBJECTIVE BOX
Patient is a 52y old  Female who presents with a chief complaint of FTT (09 Jan 2023 13:33)    Date of servie : 01-09-23 @ 13:47  INTERVAL HPI/OVERNIGHT EVENTS:  T(C): 36.7 (01-09-23 @ 11:42), Max: 37.1 (01-09-23 @ 04:56)  HR: 104 (01-09-23 @ 11:42) (98 - 104)  BP: 137/90 (01-09-23 @ 11:42) (113/82 - 137/90)  RR: 18 (01-09-23 @ 11:42) (17 - 19)  SpO2: 97% (01-09-23 @ 11:42) (97% - 100%)  Wt(kg): --  I&O's Summary    08 Jan 2023 07:01  -  09 Jan 2023 07:00  --------------------------------------------------------  IN: 200 mL / OUT: 0 mL / NET: 200 mL        LABS:                        11.1   7.37  )-----------( 494      ( 09 Jan 2023 05:25 )             35.4     01-09    138  |  105  |  10  ----------------------------<  90  3.5   |  24  |  0.44<L>    Ca    9.1      09 Jan 2023 05:25  Phos  2.5     01-09  Mg     1.90     01-09          CAPILLARY BLOOD GLUCOSE                MEDICATIONS  (STANDING):  amLODIPine   Tablet 5 milliGRAM(s) Oral daily  chlorhexidine 2% Cloths 1 Application(s) Topical daily  Dakins Solution - 1/4 Strength 1 Application(s) Topical two times a day  ferrous    sulfate 325 milliGRAM(s) Oral daily  levETIRAcetam  IVPB 500 milliGRAM(s) IV Intermittent every 12 hours  morphine  - Injectable 0.5 milliGRAM(s) IV Push once  piperacillin/tazobactam IVPB.. 3.375 Gram(s) IV Intermittent every 8 hours  polyethylene glycol 3350 17 Gram(s) Oral two times a day  QUEtiapine 25 milliGRAM(s) Oral three times a day  traZODone 150 milliGRAM(s) Oral daily    MEDICATIONS  (PRN):  acetaminophen  Suppository .. 650 milliGRAM(s) Rectal every 8 hours PRN Temp greater or equal to 38C (100.4F)  melatonin 3 milliGRAM(s) Oral at bedtime PRN Insomnia          PHYSICAL EXAM:  GENERAL: NAD, well-groomed, well-developed  HEAD:  Atraumatic, Normocephalic  CHEST/LUNG: Clear to percussion bilaterally; No rales, rhonchi, wheezing, or rubs  HEART: Regular rate and rhythm; No murmurs, rubs, or gallops  ABDOMEN: Soft, Nontender, Nondistended; Bowel sounds present  EXTREMITIES:  2+ Peripheral Pulses, No clubbing, cyanosis, or edema  LYMPH: No lymphadenopathy noted  SKIN: No rashes or lesions    Care Discussed with Consultants/Other Providers [ ] YES  [ ] NO

## 2023-01-10 RX ADMIN — QUETIAPINE FUMARATE 25 MILLIGRAM(S): 200 TABLET, FILM COATED ORAL at 13:15

## 2023-01-10 RX ADMIN — POLYETHYLENE GLYCOL 3350 17 GRAM(S): 17 POWDER, FOR SOLUTION ORAL at 05:18

## 2023-01-10 RX ADMIN — PIPERACILLIN AND TAZOBACTAM 25 GRAM(S): 4; .5 INJECTION, POWDER, LYOPHILIZED, FOR SOLUTION INTRAVENOUS at 21:32

## 2023-01-10 RX ADMIN — LEVETIRACETAM 400 MILLIGRAM(S): 250 TABLET, FILM COATED ORAL at 05:17

## 2023-01-10 RX ADMIN — QUETIAPINE FUMARATE 25 MILLIGRAM(S): 200 TABLET, FILM COATED ORAL at 21:30

## 2023-01-10 RX ADMIN — PIPERACILLIN AND TAZOBACTAM 25 GRAM(S): 4; .5 INJECTION, POWDER, LYOPHILIZED, FOR SOLUTION INTRAVENOUS at 13:30

## 2023-01-10 RX ADMIN — AMLODIPINE BESYLATE 5 MILLIGRAM(S): 2.5 TABLET ORAL at 05:18

## 2023-01-10 RX ADMIN — Medication 1 APPLICATION(S): at 05:19

## 2023-01-10 RX ADMIN — Medication 1 APPLICATION(S): at 18:25

## 2023-01-10 RX ADMIN — LEVETIRACETAM 400 MILLIGRAM(S): 250 TABLET, FILM COATED ORAL at 18:26

## 2023-01-10 RX ADMIN — CHLORHEXIDINE GLUCONATE 1 APPLICATION(S): 213 SOLUTION TOPICAL at 13:16

## 2023-01-10 RX ADMIN — Medication 325 MILLIGRAM(S): at 13:15

## 2023-01-10 RX ADMIN — Medication 150 MILLIGRAM(S): at 13:15

## 2023-01-10 RX ADMIN — PIPERACILLIN AND TAZOBACTAM 25 GRAM(S): 4; .5 INJECTION, POWDER, LYOPHILIZED, FOR SOLUTION INTRAVENOUS at 05:17

## 2023-01-10 RX ADMIN — QUETIAPINE FUMARATE 25 MILLIGRAM(S): 200 TABLET, FILM COATED ORAL at 05:19

## 2023-01-10 NOTE — PROGRESS NOTE ADULT - SUBJECTIVE AND OBJECTIVE BOX
Patient is a 52y old  Female who presents with a chief complaint of FTT (09 Jan 2023 19:02)    Date of servie : 01-10-23 @ 13:40  INTERVAL HPI/OVERNIGHT EVENTS:  T(C): 36.8 (01-10-23 @ 11:49), Max: 36.8 (01-10-23 @ 11:49)  HR: 99 (01-10-23 @ 11:49) (95 - 100)  BP: 121/90 (01-10-23 @ 11:49) (111/82 - 150/94)  RR: 18 (01-10-23 @ 11:49) (18 - 18)  SpO2: 99% (01-10-23 @ 11:49) (98% - 99%)  Wt(kg): --  I&O's Summary      LABS:                        11.1   7.37  )-----------( 494      ( 09 Jan 2023 05:25 )             35.4     01-09    138  |  105  |  10  ----------------------------<  90  3.5   |  24  |  0.44<L>    Ca    9.1      09 Jan 2023 05:25  Phos  2.5     01-09  Mg     1.90     01-09          CAPILLARY BLOOD GLUCOSE                MEDICATIONS  (STANDING):  amLODIPine   Tablet 5 milliGRAM(s) Oral daily  chlorhexidine 2% Cloths 1 Application(s) Topical daily  Dakins Solution - 1/4 Strength 1 Application(s) Topical two times a day  ferrous    sulfate 325 milliGRAM(s) Oral daily  levETIRAcetam  IVPB 500 milliGRAM(s) IV Intermittent every 12 hours  piperacillin/tazobactam IVPB.. 3.375 Gram(s) IV Intermittent every 8 hours  polyethylene glycol 3350 17 Gram(s) Oral two times a day  QUEtiapine 25 milliGRAM(s) Oral three times a day  traZODone 150 milliGRAM(s) Oral daily    MEDICATIONS  (PRN):  acetaminophen  Suppository .. 650 milliGRAM(s) Rectal every 8 hours PRN Temp greater or equal to 38C (100.4F)  melatonin 3 milliGRAM(s) Oral at bedtime PRN Insomnia          PHYSICAL EXAM:  GENERAL: NAD, well-groomed, well-developed  HEAD:  Atraumatic, Normocephalic  CHEST/LUNG: Clear to percussion bilaterally; No rales, rhonchi, wheezing, or rubs  HEART: Regular rate and rhythm; No murmurs, rubs, or gallops  ABDOMEN: Soft, Nontender, Nondistended; Bowel sounds present  EXTREMITIES:  edema +Fudilan@69      Care Discussed with Consultants/Other Providers [ x] YES  [ ] NO

## 2023-01-11 LAB
ANION GAP SERPL CALC-SCNC: 8 MMOL/L — SIGNIFICANT CHANGE UP (ref 7–14)
BASOPHILS # BLD AUTO: 0.03 K/UL — SIGNIFICANT CHANGE UP (ref 0–0.2)
BASOPHILS NFR BLD AUTO: 0.4 % — SIGNIFICANT CHANGE UP (ref 0–2)
BUN SERPL-MCNC: 10 MG/DL — SIGNIFICANT CHANGE UP (ref 7–23)
CALCIUM SERPL-MCNC: 9.7 MG/DL — SIGNIFICANT CHANGE UP (ref 8.4–10.5)
CHLORIDE SERPL-SCNC: 104 MMOL/L — SIGNIFICANT CHANGE UP (ref 98–107)
CO2 SERPL-SCNC: 29 MMOL/L — SIGNIFICANT CHANGE UP (ref 22–31)
CREAT SERPL-MCNC: 0.45 MG/DL — LOW (ref 0.5–1.3)
EGFR: 115 ML/MIN/1.73M2 — SIGNIFICANT CHANGE UP
EOSINOPHIL # BLD AUTO: 0.06 K/UL — SIGNIFICANT CHANGE UP (ref 0–0.5)
EOSINOPHIL NFR BLD AUTO: 0.8 % — SIGNIFICANT CHANGE UP (ref 0–6)
GLUCOSE SERPL-MCNC: 86 MG/DL — SIGNIFICANT CHANGE UP (ref 70–99)
HCT VFR BLD CALC: 39.9 % — SIGNIFICANT CHANGE UP (ref 34.5–45)
HGB BLD-MCNC: 12.4 G/DL — SIGNIFICANT CHANGE UP (ref 11.5–15.5)
IANC: 5.65 K/UL — SIGNIFICANT CHANGE UP (ref 1.8–7.4)
IMM GRANULOCYTES NFR BLD AUTO: 0.7 % — SIGNIFICANT CHANGE UP (ref 0–0.9)
LYMPHOCYTES # BLD AUTO: 1.06 K/UL — SIGNIFICANT CHANGE UP (ref 1–3.3)
LYMPHOCYTES # BLD AUTO: 14.3 % — SIGNIFICANT CHANGE UP (ref 13–44)
MAGNESIUM SERPL-MCNC: 2 MG/DL — SIGNIFICANT CHANGE UP (ref 1.6–2.6)
MCHC RBC-ENTMCNC: 27.9 PG — SIGNIFICANT CHANGE UP (ref 27–34)
MCHC RBC-ENTMCNC: 31.1 GM/DL — LOW (ref 32–36)
MCV RBC AUTO: 89.9 FL — SIGNIFICANT CHANGE UP (ref 80–100)
MONOCYTES # BLD AUTO: 0.57 K/UL — SIGNIFICANT CHANGE UP (ref 0–0.9)
MONOCYTES NFR BLD AUTO: 7.7 % — SIGNIFICANT CHANGE UP (ref 2–14)
NEUTROPHILS # BLD AUTO: 5.65 K/UL — SIGNIFICANT CHANGE UP (ref 1.8–7.4)
NEUTROPHILS NFR BLD AUTO: 76.1 % — SIGNIFICANT CHANGE UP (ref 43–77)
NRBC # BLD: 0 /100 WBCS — SIGNIFICANT CHANGE UP (ref 0–0)
NRBC # FLD: 0 K/UL — SIGNIFICANT CHANGE UP (ref 0–0)
PHOSPHATE SERPL-MCNC: 2.8 MG/DL — SIGNIFICANT CHANGE UP (ref 2.5–4.5)
PLATELET # BLD AUTO: 614 K/UL — HIGH (ref 150–400)
POTASSIUM SERPL-MCNC: 3.8 MMOL/L — SIGNIFICANT CHANGE UP (ref 3.5–5.3)
POTASSIUM SERPL-SCNC: 3.8 MMOL/L — SIGNIFICANT CHANGE UP (ref 3.5–5.3)
RBC # BLD: 4.44 M/UL — SIGNIFICANT CHANGE UP (ref 3.8–5.2)
RBC # FLD: 14.2 % — SIGNIFICANT CHANGE UP (ref 10.3–14.5)
SODIUM SERPL-SCNC: 141 MMOL/L — SIGNIFICANT CHANGE UP (ref 135–145)
WBC # BLD: 7.42 K/UL — SIGNIFICANT CHANGE UP (ref 3.8–10.5)
WBC # FLD AUTO: 7.42 K/UL — SIGNIFICANT CHANGE UP (ref 3.8–10.5)

## 2023-01-11 PROCEDURE — 99232 SBSQ HOSP IP/OBS MODERATE 35: CPT

## 2023-01-11 RX ORDER — PIPERACILLIN AND TAZOBACTAM 4; .5 G/20ML; G/20ML
3.38 INJECTION, POWDER, LYOPHILIZED, FOR SOLUTION INTRAVENOUS EVERY 8 HOURS
Refills: 0 | Status: DISCONTINUED | OUTPATIENT
Start: 2023-01-11 | End: 2023-01-15

## 2023-01-11 RX ADMIN — AMLODIPINE BESYLATE 5 MILLIGRAM(S): 2.5 TABLET ORAL at 05:09

## 2023-01-11 RX ADMIN — PIPERACILLIN AND TAZOBACTAM 25 GRAM(S): 4; .5 INJECTION, POWDER, LYOPHILIZED, FOR SOLUTION INTRAVENOUS at 12:00

## 2023-01-11 RX ADMIN — LEVETIRACETAM 400 MILLIGRAM(S): 250 TABLET, FILM COATED ORAL at 17:58

## 2023-01-11 RX ADMIN — Medication 1 APPLICATION(S): at 17:55

## 2023-01-11 RX ADMIN — QUETIAPINE FUMARATE 25 MILLIGRAM(S): 200 TABLET, FILM COATED ORAL at 12:01

## 2023-01-11 RX ADMIN — PIPERACILLIN AND TAZOBACTAM 25 GRAM(S): 4; .5 INJECTION, POWDER, LYOPHILIZED, FOR SOLUTION INTRAVENOUS at 20:50

## 2023-01-11 RX ADMIN — LEVETIRACETAM 400 MILLIGRAM(S): 250 TABLET, FILM COATED ORAL at 05:06

## 2023-01-11 RX ADMIN — Medication 150 MILLIGRAM(S): at 12:01

## 2023-01-11 RX ADMIN — POLYETHYLENE GLYCOL 3350 17 GRAM(S): 17 POWDER, FOR SOLUTION ORAL at 05:09

## 2023-01-11 RX ADMIN — Medication 325 MILLIGRAM(S): at 12:01

## 2023-01-11 RX ADMIN — Medication 1 APPLICATION(S): at 05:09

## 2023-01-11 RX ADMIN — QUETIAPINE FUMARATE 25 MILLIGRAM(S): 200 TABLET, FILM COATED ORAL at 05:09

## 2023-01-11 RX ADMIN — PIPERACILLIN AND TAZOBACTAM 25 GRAM(S): 4; .5 INJECTION, POWDER, LYOPHILIZED, FOR SOLUTION INTRAVENOUS at 05:33

## 2023-01-11 RX ADMIN — QUETIAPINE FUMARATE 25 MILLIGRAM(S): 200 TABLET, FILM COATED ORAL at 21:33

## 2023-01-11 RX ADMIN — CHLORHEXIDINE GLUCONATE 1 APPLICATION(S): 213 SOLUTION TOPICAL at 12:01

## 2023-01-11 RX ADMIN — POLYETHYLENE GLYCOL 3350 17 GRAM(S): 17 POWDER, FOR SOLUTION ORAL at 17:56

## 2023-01-11 NOTE — PROGRESS NOTE ADULT - ASSESSMENT
53 yo female with a PMHx of traumatic subdural hemorrhage, dementia, HTN,  who was BIMBEMS from [facility] for failure to thrive.     Overall fever, tachycardia, leucocytosis, sepsis, ? source.   resolved sepsis,       PLAN:   c/w zosyn,   will plan for 10 days of abx total, day 8/10 of therapy today   f/u blood cx, NTD   f/u urine cx negative  trend cbc, for leucocytosis, now resolved.   CT chest/abd/pelvis with sacral decubitus with underlying chronic OM  role of abx very limited in setting of chronic OM in absence of surgery    c/w aggressive wound care, Frequent turning and positioning, Glycemic control, Optimize nutrition.     Plan discussed with Medicine Attending.     Will sign off, please call with questions.     Radha Ramirez  Please contact through MS Teams   If no response or past 5 pm/weekend call [phone].

## 2023-01-11 NOTE — DIETITIAN INITIAL EVALUATION ADULT - ADD RECOMMEND
1) Continue on current PO diet rx: Pureed, moderately thick liquids per MDs orders  2) Repeat swallow eval prn  3)  department to provide Magic Cup 1x/day (290 kcal, 9 gm protein) and Soko Shake 2x daily (1040 marisabel and 44 gm protein) to promote optimal PO intake   4) Encourage PO intake and honor food preferences as able, provide necessary assistance at meals  5) Obtain weekly wts, and obtain new height  6) Recommend Vitamin C and multivitamin once daily for micronutrient provisions   7) RD to f/u prn, consult as needed

## 2023-01-11 NOTE — PROGRESS NOTE ADULT - SUBJECTIVE AND OBJECTIVE BOX
52y old  Female who presents with a chief complaint of Hyperosmolality with hypernatremia      Interval history:  Afebrile, not eating much. Awake, not verbal.       Allergies:   No Known Allergies      Antimicrobials:      REVIEW OF SYSTEMS:  Unable to obtain due to pt's underlying mental status.       Vital Signs Last 24 Hrs  T(C): 36.4 (01-11-23 @ 15:50), Max: 36.7 (01-10-23 @ 19:26)  T(F): 97.6 (01-11-23 @ 15:50), Max: 98.1 (01-10-23 @ 19:26)  HR: 83 (01-11-23 @ 15:50) (83 - 99)  BP: 124/91 (01-11-23 @ 15:50) (121/77 - 128/92)  BP(mean): --  RR: 16 (01-11-23 @ 15:50) (16 - 18)  SpO2: 98% (01-11-23 @ 15:50) (98% - 100%)      PHYSICAL EXAM:  Pt in no acute distress, awake. not verbal.   + air entry b/l.   non distended abdomen  no edema LE   no phlebitis                               12.4   7.42  )-----------( 614      ( 11 Jan 2023 11:45 )             39.9   01-11    141  |  104  |  10  ----------------------------<  86  3.8   |  29  |  0.45<L>    Ca    9.7      11 Jan 2023 11:45  Phos  2.8     01-11  Mg     2.00     01-11

## 2023-01-11 NOTE — DIETITIAN INITIAL EVALUATION ADULT - NSICDXPASTMEDICALHX_GEN_ALL_CORE_FT
PAST MEDICAL HISTORY:  Dementia     History of Langlade's disease     HLD (hyperlipidemia)     HTN (hypertension)

## 2023-01-11 NOTE — PROGRESS NOTE ADULT - ASSESSMENT
53 yo female with a PMHx of traumatic subdural hemorrhage, dementia, HTN,  who was BIMBEMS from [facility] for failure to thrive. Facility states that for the past 3 days the pt has been spitting out her medications and food, and has significantly decreased her PO intake. Per the facility, the pt is A&Ox0 and nonverbal at baseline. She does occasionally follow commands. On examination here pt was not following commands    1 FTT  - poor po inatke  - calorie count   - will follow recs  - check repeat speech and swallow    2 Fever  - unclear etiology  - fu  blood cultures  - ID fu  - ABX as per ID     3 Hypernatremia  - monitor BMP  - IVF increased  - renal fu     4 HTN  - cw home meds  - DASH diet

## 2023-01-11 NOTE — DIETITIAN INITIAL EVALUATION ADULT - ORAL INTAKE PTA/DIET HISTORY
PTA was living in nursing home. Per Nursing home paperwork: Pt was on SAMANTHA, Pureed, nectar (mildly) thick liquids diet, Pt was receiving, Vitamin C and ferrous sulfate, as well as LPS 2x/day (200kcal, 30gm protein) and boost glucose control supplement 2x/day (380 kcal, 32 gm protein).

## 2023-01-11 NOTE — DIETITIAN INITIAL EVALUATION ADULT - OTHER INFO
Per chart,  51 yo female with a PMHx of traumatic subdural hemorrhage, dementia, HTN,  who was BIMBEMS from [facility] for failure to thrive. Facility states that for the past 3 days the pt has been spitting out her medications and food, and has significantly decreased her PO intake. Per the facility, the pt is A&Ox0 and nonverbal at baseline. She does occasionally follow commands. On examination here pt was not following commands    Unable to conduct nutrition interview 2/2 decreased cognition. Information obtained from comprehensive chart review, per RN and PCA today: No recent episodes of nausea, vomiting, diarrhea or constipation, BM noted on 1/7 per RN flowsheets. Continue to monitor and document BMs in flowsheets. Continues on bowel regimen (miralax 2x/day). Per RN today pt with chewing/swallowing difficulties- Pt is pocketing food, pending repeat swallow eval, previous eval 1/5 SLP deferred diet to MD. No food allergies noted per EMR. No clear weight/height documented in NH paperwork. RD obtained weight via bedscale today (1/11): 85.3# / 38.8 kg. Food preferences explored with staff and noted. Intake is 25% or less per RN flowsheets and per PCA today. Feeding skills: total assistance required.  department to provide Magic Cup 1x/day (290 kcal, 9 gm protein) and [employer] Shake 2x daily (1040 marisabel and 44 gm protein) to promote optimal PO intake

## 2023-01-11 NOTE — PROGRESS NOTE ADULT - SUBJECTIVE AND OBJECTIVE BOX
Patient is a 52y old  Female who presents with a chief complaint of FTT (10 Tonny 2023 13:39)    Date of servie : 01-11-23 @ 14:57  INTERVAL HPI/OVERNIGHT EVENTS:  T(C): 36.6 (01-11-23 @ 05:09), Max: 36.7 (01-10-23 @ 19:26)  HR: 88 (01-11-23 @ 05:09) (88 - 99)  BP: 121/77 (01-11-23 @ 05:09) (121/77 - 128/92)  RR: 18 (01-11-23 @ 05:09) (18 - 18)  SpO2: 100% (01-11-23 @ 05:09) (98% - 100%)  Wt(kg): --  I&O's Summary      LABS:                        12.4   7.42  )-----------( 614      ( 11 Jan 2023 11:45 )             39.9     01-11    141  |  104  |  10  ----------------------------<  86  3.8   |  29  |  0.45<L>    Ca    9.7      11 Jan 2023 11:45  Phos  2.8     01-11  Mg     2.00     01-11          CAPILLARY BLOOD GLUCOSE                MEDICATIONS  (STANDING):  amLODIPine   Tablet 5 milliGRAM(s) Oral daily  chlorhexidine 2% Cloths 1 Application(s) Topical daily  Dakins Solution - 1/4 Strength 1 Application(s) Topical two times a day  ferrous    sulfate 325 milliGRAM(s) Oral daily  levETIRAcetam  IVPB 500 milliGRAM(s) IV Intermittent every 12 hours  polyethylene glycol 3350 17 Gram(s) Oral two times a day  QUEtiapine 25 milliGRAM(s) Oral three times a day  traZODone 150 milliGRAM(s) Oral daily    MEDICATIONS  (PRN):  acetaminophen  Suppository .. 650 milliGRAM(s) Rectal every 8 hours PRN Temp greater or equal to 38C (100.4F)  melatonin 3 milliGRAM(s) Oral at bedtime PRN Insomnia          PHYSICAL EXAM:  GENERAL: frail  CHEST/LUNG: Clear to percussion bilaterally; No rales, rhonchi, wheezing, or rubs  HEART: Regular rate and rhythm; No murmurs, rubs, or gallops  ABDOMEN: Soft, Nontender, Nondistended; Bowel sounds present  EXTREMITIES: edema +    Care Discussed with Consultants/Other Providers [ ] YES  [ ] NO

## 2023-01-11 NOTE — DIETITIAN NUTRITION RISK NOTIFICATION - LOSS OF SUBCUTANEOUS FAT
Nutrition Assessment (Parenteral Nutrition)    Type and Reason for Visit: Reassess, Consult(PN recs)    Nutrition Recommendations:   Refer to Pharmacy for PN recommendations (5/20 formula goal rate of 80 ml per hour. Monitor for refeeding. If triglycerides are acceptable, recommend lipid infusions (250 ml (20%) twice weekly    Nutrition Assessment: Pt remains on mechanical ventilation with pressor support. NG continues to LWS. Surgery continues to follow with plan to possibly start TF if pt remains intubated. In the meantime, PN started (5/20 formula) at current rate of 40 ml per hour. Coverage added for elevated glucose trend. Pharmacy managing PN. Will refer to Pharmacy for PN goal rate. Noted triglyceride level, taken this date, at 141. Malnutrition Assessment:  · Malnutrition Status: At risk for malnutrition  · Context: Acute illness or injury  · Findings of the 6 clinical characteristics of malnutrition (Minimum of 2 out of 6 clinical characteristics is required to make the diagnosis of moderate or severe Protein Calorie Malnutrition based on AND/ASPEN Guidelines):  1. Energy Intake-Less than or equal to 50% of estimated energy requirement, Greater than or equal to 5 days    2. Weight Loss-Unable to assess,    3. Fat Loss-No significant subcutaneous fat loss,    4. Muscle Loss-No significant muscle mass loss,    5. Fluid Accumulation-Moderate to severe fluid accumulation, Generalized  6.  Strength-Not measured    Nutrition Risk Level: High    Nutrient Needs:  · Estimated Daily Total Kcal: 2304-0152 (20-25 x ABW 64 kg)  · Estimated Daily Protein (g): 77-96 (1.2-1.5 x ABW)  · Estimated Daily Total Fluid (ml/day): 1 ml per kcal    Nutrition Diagnosis:   · Problem: Inadequate oral intake  · Etiology: related to Alteration in GI function     Signs and symptoms:  as evidenced by NPO status due to medical condition    Objective Information:  · Nutrition-Focused Physical Findings: BM 12/26.  Noted +2 Triceps... generalized edema. · Wound Type: (noted no issues with abd SI)  · Current Nutrition Therapies:  · Oral Diet Orders: NPO   · Oral Diet intake: NPO  · Oral Nutrition Supplement (ONS) Orders: None  · ONS intake: NPO  · Parenteral Nutrition  5/20 formula goal rate of 80 ml per hour. Monitor for refeeding. If triglycerides acceptable, recommend lipid infusions (250 ml (20%) twice weekly. Regimen offers; 1920 ml TV / 96 gms pro / 384 gms dextrose / 1833 kcals (1690 (PN) + 143 (lipids) / 4.2 GIR per day.    · Additional Calories: none  · Anthropometric Measures:  · Ht: 5' 6\" (167.6 cm)   · Current Body Wt: 139 lb (63 kg)  · Usual Body Wt: (per records, wt looks to be high 120s - low 130s)  · % Weight Change:  ,  losing trend noted likely due in part to fluid shifts as well as limited diet orders  · Ideal Body Wt: 130 lb (59 kg), % Ideal Body    · BMI Classification: BMI 18.5 - 24.9 Normal Weight    Nutrition Interventions:   Continue Parenteral Nutrition(refer to Pharmacy with recommendations for PN goal rate.)  Continued Inpatient Monitoring, Education Not Indicated    Nutrition Evaluation:   · Evaluation: Progressing toward goals   · Goals: tolerate most appropriate form of nutrition   · Monitoring: Nutrition Progression, PN Intake, PN Tolerance, Wound Healing, Weight, Pertinent Labs, Monitor Bowel Function      Electronically signed by Gabriel Betancur RD, LD on 12/26/19 at 1:37 PM    Contact Number: [phone]

## 2023-01-12 LAB
ANION GAP SERPL CALC-SCNC: 10 MMOL/L — SIGNIFICANT CHANGE UP (ref 7–14)
BASOPHILS # BLD AUTO: 0.02 K/UL — SIGNIFICANT CHANGE UP (ref 0–0.2)
BASOPHILS NFR BLD AUTO: 0.4 % — SIGNIFICANT CHANGE UP (ref 0–2)
BUN SERPL-MCNC: 10 MG/DL — SIGNIFICANT CHANGE UP (ref 7–23)
CALCIUM SERPL-MCNC: 9.4 MG/DL — SIGNIFICANT CHANGE UP (ref 8.4–10.5)
CHLORIDE SERPL-SCNC: 103 MMOL/L — SIGNIFICANT CHANGE UP (ref 98–107)
CO2 SERPL-SCNC: 24 MMOL/L — SIGNIFICANT CHANGE UP (ref 22–31)
CREAT SERPL-MCNC: 0.41 MG/DL — LOW (ref 0.5–1.3)
EGFR: 118 ML/MIN/1.73M2 — SIGNIFICANT CHANGE UP
EOSINOPHIL # BLD AUTO: 0.09 K/UL — SIGNIFICANT CHANGE UP (ref 0–0.5)
EOSINOPHIL NFR BLD AUTO: 1.7 % — SIGNIFICANT CHANGE UP (ref 0–6)
GLUCOSE SERPL-MCNC: 101 MG/DL — HIGH (ref 70–99)
HCT VFR BLD CALC: 36.5 % — SIGNIFICANT CHANGE UP (ref 34.5–45)
HGB BLD-MCNC: 11.5 G/DL — SIGNIFICANT CHANGE UP (ref 11.5–15.5)
IANC: 3.92 K/UL — SIGNIFICANT CHANGE UP (ref 1.8–7.4)
IMM GRANULOCYTES NFR BLD AUTO: 0.7 % — SIGNIFICANT CHANGE UP (ref 0–0.9)
LYMPHOCYTES # BLD AUTO: 0.92 K/UL — LOW (ref 1–3.3)
LYMPHOCYTES # BLD AUTO: 16.9 % — SIGNIFICANT CHANGE UP (ref 13–44)
MAGNESIUM SERPL-MCNC: 2 MG/DL — SIGNIFICANT CHANGE UP (ref 1.6–2.6)
MCHC RBC-ENTMCNC: 28.1 PG — SIGNIFICANT CHANGE UP (ref 27–34)
MCHC RBC-ENTMCNC: 31.5 GM/DL — LOW (ref 32–36)
MCV RBC AUTO: 89.2 FL — SIGNIFICANT CHANGE UP (ref 80–100)
MONOCYTES # BLD AUTO: 0.44 K/UL — SIGNIFICANT CHANGE UP (ref 0–0.9)
MONOCYTES NFR BLD AUTO: 8.1 % — SIGNIFICANT CHANGE UP (ref 2–14)
NEUTROPHILS # BLD AUTO: 3.92 K/UL — SIGNIFICANT CHANGE UP (ref 1.8–7.4)
NEUTROPHILS NFR BLD AUTO: 72.2 % — SIGNIFICANT CHANGE UP (ref 43–77)
NRBC # BLD: 0 /100 WBCS — SIGNIFICANT CHANGE UP (ref 0–0)
NRBC # FLD: 0 K/UL — SIGNIFICANT CHANGE UP (ref 0–0)
PHOSPHATE SERPL-MCNC: 2.6 MG/DL — SIGNIFICANT CHANGE UP (ref 2.5–4.5)
PLATELET # BLD AUTO: 625 K/UL — HIGH (ref 150–400)
POTASSIUM SERPL-MCNC: 3.6 MMOL/L — SIGNIFICANT CHANGE UP (ref 3.5–5.3)
POTASSIUM SERPL-SCNC: 3.6 MMOL/L — SIGNIFICANT CHANGE UP (ref 3.5–5.3)
RBC # BLD: 4.09 M/UL — SIGNIFICANT CHANGE UP (ref 3.8–5.2)
RBC # FLD: 14.1 % — SIGNIFICANT CHANGE UP (ref 10.3–14.5)
SODIUM SERPL-SCNC: 137 MMOL/L — SIGNIFICANT CHANGE UP (ref 135–145)
WBC # BLD: 5.43 K/UL — SIGNIFICANT CHANGE UP (ref 3.8–10.5)
WBC # FLD AUTO: 5.43 K/UL — SIGNIFICANT CHANGE UP (ref 3.8–10.5)

## 2023-01-12 PROCEDURE — 99233 SBSQ HOSP IP/OBS HIGH 50: CPT

## 2023-01-12 RX ADMIN — Medication 1 APPLICATION(S): at 05:31

## 2023-01-12 RX ADMIN — Medication 325 MILLIGRAM(S): at 12:20

## 2023-01-12 RX ADMIN — LEVETIRACETAM 400 MILLIGRAM(S): 250 TABLET, FILM COATED ORAL at 05:11

## 2023-01-12 RX ADMIN — PIPERACILLIN AND TAZOBACTAM 25 GRAM(S): 4; .5 INJECTION, POWDER, LYOPHILIZED, FOR SOLUTION INTRAVENOUS at 04:46

## 2023-01-12 RX ADMIN — AMLODIPINE BESYLATE 5 MILLIGRAM(S): 2.5 TABLET ORAL at 05:11

## 2023-01-12 RX ADMIN — PIPERACILLIN AND TAZOBACTAM 25 GRAM(S): 4; .5 INJECTION, POWDER, LYOPHILIZED, FOR SOLUTION INTRAVENOUS at 19:16

## 2023-01-12 RX ADMIN — PIPERACILLIN AND TAZOBACTAM 25 GRAM(S): 4; .5 INJECTION, POWDER, LYOPHILIZED, FOR SOLUTION INTRAVENOUS at 12:16

## 2023-01-12 RX ADMIN — Medication 150 MILLIGRAM(S): at 12:19

## 2023-01-12 RX ADMIN — POLYETHYLENE GLYCOL 3350 17 GRAM(S): 17 POWDER, FOR SOLUTION ORAL at 05:12

## 2023-01-12 RX ADMIN — LEVETIRACETAM 400 MILLIGRAM(S): 250 TABLET, FILM COATED ORAL at 17:26

## 2023-01-12 RX ADMIN — QUETIAPINE FUMARATE 25 MILLIGRAM(S): 200 TABLET, FILM COATED ORAL at 14:19

## 2023-01-12 RX ADMIN — CHLORHEXIDINE GLUCONATE 1 APPLICATION(S): 213 SOLUTION TOPICAL at 12:18

## 2023-01-12 RX ADMIN — Medication 1 APPLICATION(S): at 17:28

## 2023-01-12 RX ADMIN — QUETIAPINE FUMARATE 25 MILLIGRAM(S): 200 TABLET, FILM COATED ORAL at 22:42

## 2023-01-12 RX ADMIN — QUETIAPINE FUMARATE 25 MILLIGRAM(S): 200 TABLET, FILM COATED ORAL at 05:12

## 2023-01-12 NOTE — SWALLOW BEDSIDE ASSESSMENT ADULT - SWALLOW EVAL: DIAGNOSIS
Mild oral dysphagia for puree and mildly thick liquids marked by adequate stripping off teaspoon with mild anterior loss due to weak labial seal with slow transport. 2. Pharyngeal phase is marked by a present pharyngeal swallow trigger with hyolaryngeal elevation noted upon digital palpation without evidence of airway penetration/aspiration.

## 2023-01-12 NOTE — SWALLOW BEDSIDE ASSESSMENT ADULT - DATE
I have personally performed a face to face diagnostic evaluation on this patient. I have reviewed the ACP note and agree with the history, exam and plan of care, except as noted.
12-Jan-2023
05-Jan-2023

## 2023-01-12 NOTE — PATIENT PROFILE ADULT - FALL HARM RISK - HARM RISK INTERVENTIONS

## 2023-01-12 NOTE — SWALLOW BEDSIDE ASSESSMENT ADULT - ORAL PREPARATORY PHASE
Reduced retrieval of spoon presentation, Mild to Moderate anterior spillage due to poor labial seal, Slowed manipulation
Anterior loss of bolus

## 2023-01-12 NOTE — PROGRESS NOTE ADULT - ASSESSMENT
Assessment/Plan: 51 yo female with a PMHx of traumatic subdural hemorrhage, arslan's disease, dementia, HTN,  who was BIBEMS from [facility] for failure to thrive.     Wound f/u requested to assist w/ management of chronic stage 4 pressure injury.  - less bone exposed but remains (sharp in center and areas of undermining), viable granulation and muscle.   - Areas of purple-maroon discoloration in wound bed resolved  - No necrotic tissue.  - undermining remains  - no purulent drainage, no odor.   - No s/s of acute infection; no drainable collections.  - (+) bedbound  - severe cachexia; protruding bony prominences.  - Incontinent urine and stool.  - poor PO intake, FTT  - CT AP 1/7/23 sacral ulcer increased in size as compared to CT 4/20/22, chronic osteomyelitis, air extends from the level of the decubitus into the spinal canal, no associated fluid collection.  -ABx management per ID.  - Will treat with Dakins twice a day in setting of urinary and fecal incontinence; patient frail, severe protein calorie malnutrition; wound unlikely to markedly improve, goal of care to maintain, provide antimicrobial support.  - Topical Recommendations: Cleanse wound and periwound with NS. Dry well. Apply Liquid barrier film to periwound skin. Apply silicone contact layer to wound base (to cover sharp bone exposed), Pack areas of undermining and dead space with Dakins 1/4 strength moistened kerlix. Cover with abdominal pad and tegaderm, change twice a day,  - Continue low airloss support surface, t&P per protocol with use of fluidized postioning devices.  - Perineal care per protocol, once dressing is in place apply bruno moisture barrier paste every shift and prn with episodes of incontinence. Continue use of single breathable incontinence pad.  - Continue use of complete cair boots.    FTT  - Nutrition recommendations appreciated, severe protein calorie malnutrition.  - Poor PO intake.   - Strongly recommend goals of care discussion     Abx per ID; leukocytosis resolved, patient afrebrile, sacral stage 4 unlikely primary source of infection.  Consider goals of care discussion, poor PO intake, no enteral feeds, FTT, dementia, [place]'s disease, bedbound, severe cachexia temporal and muscle wasting, unable to follow commands, stage 4 pressure injury, incontinent urine and stool.  Consider palliative care consult.    Upon discharge f/u as outpatient at [facility] [address] [phone]  Findings and plan discussed with primary ACP Cady. Plan for palliative care consult placed. Please f/u recommendations.   Will follow periodically while inpatient, please reconsult earlier as needed.  Remainder of care per primary team.    Thank you for this consult  CHAD Keller, MONO (pager #[phone])    If after 4PM or before 7:30AM on Mon-Friday or weekend/holiday please contact general surgery for urgent matters.   Team A- [id number]   Team B- [id number]  For non-urgent matters e-mail [email or web address]    I spent 35 minutes face to face with this patient of which more than 50% of the time was spent counseling & coordinating care of this pt

## 2023-01-12 NOTE — PROGRESS NOTE ADULT - SUBJECTIVE AND OBJECTIVE BOX
[facility]-- WOUND TEAM -- FOLLOW UP NOTE  --------------------------------------------------------------------------------    subjective: Patient lying in bed, appears in NAD, nonverbal. Unable to obtain subjective data.    Interval HPI/24 hour events:   Patient transferred from 4S to 8N  Per records and staff patient eating minimally    Chart reviewed including labs and relevant images      Diet:  Diet, Pureed:   Moderately Thick Liquids (MODTHICKLIQS) (01-06-23 @ 09:22)      ROS: pt unable to offer    ALLERGIES & MEDICATIONS  --------------------------------------------------------------------------------  Allergies    No Known Allergies    Intolerances          STANDING INPATIENT MEDICATIONS    amLODIPine   Tablet 5 milliGRAM(s) Oral daily  chlorhexidine 2% Cloths 1 Application(s) Topical daily  Dakins Solution - 1/4 Strength 1 Application(s) Topical two times a day  ferrous    sulfate 325 milliGRAM(s) Oral daily  levETIRAcetam  IVPB 500 milliGRAM(s) IV Intermittent every 12 hours  piperacillin/tazobactam IVPB.. 3.375 Gram(s) IV Intermittent every 8 hours  polyethylene glycol 3350 17 Gram(s) Oral two times a day  QUEtiapine 25 milliGRAM(s) Oral three times a day  traZODone 150 milliGRAM(s) Oral daily      PRN INPATIENT MEDICATION  acetaminophen  Suppository .. 650 milliGRAM(s) Rectal every 8 hours PRN  melatonin 3 milliGRAM(s) Oral at bedtime PRN        Vital signs:  T(C): 36.5 (01-12-23 @ 05:11), Max: 36.6 (01-11-23 @ 17:50)  HR: 87 (01-12-23 @ 05:11) (80 - 89)  BP: 120/90 (01-12-23 @ 06:30) (120/90 - 126/93)  RR: 17 (01-12-23 @ 05:11) (16 - 17)  SpO2: 100% (01-12-23 @ 05:11) (97% - 100%)  Wt(kg): --      Constitutional: NAD, eyes open, severely cachetic, temporal wasting, protruding bony prominences.  (+) low airloss support surface, (+) fluidized positioning devices, (+) complete cair boots  HEENT:  NC/AT, eyes open, mucosa moist, poor dentition/missing teeth  Cardiovascular: rate regular  Respiratory: nonlabored, equal chest expansion, room air  Gastrointestinal: soft NT/ND, incontinent stool  : incontinent urine during exam, perineal care provided, external female urinary collection device;   Neurology: (+) dementia, unable to follow commands, nonverbal  functional quadriplegic   Musculoskeletal:  limited, left hand and digits contracted; zuleyka placed in palm of hand to prevent nail trauma to palm, right hand and digits fixed in hyperextension. B/L LE flaccid in extension.  Vascular: BLE equally warm, +2 dp pulses, capillary refill < 3 seconds.  Skin:  moist w/ good turgor  Sacrum stage 4 pressure injury- patient turned to right side- [id number], undermining from 8-2 o'clock ranging from 1cm-3.5cm, with 3.5cm at 8-9 o'clock. Less bone exposed, sharp in center, predominant bone exposed beneath areas of undermining. Tissue type with mixed agranular base and viable muscle, purple-maroon discoloration of wound base resolved. No necrotic tissue. No purulent drainage, no odor. Periwound at 5 o'clock 1cm from wound edge with skin slippage exposed pink-moist dermis and intact purple-maroon epidermis 2.5cmx2.5cmx0.1cm; remainder of periwound skin with chronic hyperpigmentation No induration, no increased warmth. Remaining periwound skin no fluctuance, no induration, no associated cellulitis. No s/s of acute skin infection.          LABS/ CULTURES/ RADIOLOGY:              11.5   5.43  >-----------<  625      [01-12-23 @ 06:35]              36.5     137  |  103  |  10  ----------------------------<  101      [01-12-23 @ 06:35]  3.6   |  24  |  0.41        Ca     9.4     [01-12-23 @ 06:35]      Mg     2.00     [01-12-23 @ 06:35]      Phos  2.6     [01-12-23 @ 06:35]              < from: CT Abdomen and Pelvis w/ IV Cont (01.07.23 @ 11:47) >  ACC: [id number] EXAM:  CT ABDOMEN AND PELVIS IC                        ACC: [id number] EXAM:  CT CHEST IC                          PROCEDURE DATE:  01/07/2023          INTERPRETATION:  CLINICAL INFORMATION: Fever of unknown origin, failure   to thrive    COMPARISON: CT abdomen pelvis 8/4/2022    CONTRAST/COMPLICATIONS:  IV Contrast: Omnipaque 350  90 cc administered   10 cc discarded  Oral Contrast: NONE  Complications: None reported at time of study completion    PROCEDURE:  CT of the Chest, Abdomen and Pelvis was performed.  Sagittal and coronal reformats were performed.    FINDINGS:  CHEST:  LUNGS AND LARGE AIRWAYS: Mild secretions within the trachea. Mild   groundglass opacities in the left lower lobe are nonspecific and possibly   infectious.  PLEURA: No pleural effusion.  VESSELS: Within normal limits.  HEART: Heart size is normal. No pericardial effusion.  MEDIASTINUM AND ROBYN: No lymphadenopathy. Mild pneumomediastinum,   etiology unclear. No mediastinal fluid or inflammation.  CHEST WALL AND LOWER NECK: Within normal limits.    ABDOMEN AND PELVIS:  LIVER: Within normal limits.  BILE DUCTS: Normal caliber.  GALLBLADDER: Within normal limits.  SPLEEN: Within normal limits.  PANCREAS: Within normal limits.  ADRENALS: Within normallimits.  KIDNEYS/URETERS: A 3 mm nonobstructing right renal calculus. No   hydronephrosis.    BLADDER: Within normal limits.  REPRODUCTIVE ORGANS: Fibroid uterus.    BOWEL: No bowel obstruction. Large rectal stool burden.  PERITONEUM: No ascites.  VESSELS: Atherosclerotic changes.  RETROPERITONEUM/LYMPH NODES: No lymphadenopathy.  ABDOMINAL WALL/BONES: Large sacral decubitus ulceration increased from   prior imaging. Heterogeneous sclerosis of the sacrum with evidence of   bony erosion consistentwith chronic osteomyelitis. Foci of air extend   from a decubitus ulcer into the spinal canal. No associated fluid   collections.    IMPRESSION:  Sacral decubitus ulcer is increased in size compared with prior imaging   dated 4/20/2022. Increased sacral sclerosis with foci of erosion   consistent with chronic osteomyelitis.    Air extends from the level of the decubitus into the spinal canal. No   associated fluid collection.    Mild pneumomediastinum, etiology unclear. No mediastinal fluid or   inflammation.    --- End of Report ---          CECY SIMONS MD; Resident Radiologist  This document has been electronically signed.  FLORENCIO MURGUIA MD; Attending Radiologist  This document has been electronically signed. Jan 7 2023  1:55PM    < end of copied text >

## 2023-01-12 NOTE — SWALLOW BEDSIDE ASSESSMENT ADULT - COMMENTS
Of Note: Patient seen for an initial assessment of the swallow on 1/5 at which time PO diet was deferred to MD (please see note).    Patient seen at bedside this afternoon for a reassessment of the swallow function, at which time patient was alert. Patient is nonverbal at baseline Wound Care 1/12 "51 yo female with a PMHx of traumatic subdural hemorrhage, arslan's disease, dementia, HTN,  who was BIBEMS from [facility] for failure to thrive."    CT Chest 1/7 "Mild secretions within the trachea. Mild groundglass opacities in the left lower lobe are nonspecific and possibly infectious."    Of Note: Patient seen for an initial assessment of the swallow on 1/5 at which time PO diet was deferred to MD (please see note).    Patient seen at bedside this afternoon for a reassessment of the swallow function, at which time patient was alert. Patient is nonverbal at baseline (per  wound care note). Patient accepted PO trials upon teaspoon presentation.

## 2023-01-12 NOTE — PROGRESS NOTE ADULT - SUBJECTIVE AND OBJECTIVE BOX
Patient is a 52y old  Female who presents with a chief complaint of FTT (12 Jan 2023 12:51)    Date of servie : 01-12-23 @ 16:38  INTERVAL HPI/OVERNIGHT EVENTS:  T(C): 36.3 (01-12-23 @ 13:30), Max: 36.6 (01-11-23 @ 17:50)  HR: 91 (01-12-23 @ 13:30) (80 - 91)  BP: 143/95 (01-12-23 @ 13:30) (120/90 - 143/95)  RR: 17 (01-12-23 @ 05:11) (16 - 17)  SpO2: 100% (01-12-23 @ 13:30) (97% - 100%)  Wt(kg): --  I&O's Summary      LABS:                        11.5   5.43  )-----------( 625      ( 12 Jan 2023 06:35 )             36.5     01-12    137  |  103  |  10  ----------------------------<  101<H>  3.6   |  24  |  0.41<L>    Ca    9.4      12 Jan 2023 06:35  Phos  2.6     01-12  Mg     2.00     01-12          CAPILLARY BLOOD GLUCOSE                MEDICATIONS  (STANDING):  amLODIPine   Tablet 5 milliGRAM(s) Oral daily  chlorhexidine 2% Cloths 1 Application(s) Topical daily  Dakins Solution - 1/4 Strength 1 Application(s) Topical two times a day  ferrous    sulfate 325 milliGRAM(s) Oral daily  levETIRAcetam  IVPB 500 milliGRAM(s) IV Intermittent every 12 hours  piperacillin/tazobactam IVPB.. 3.375 Gram(s) IV Intermittent every 8 hours  polyethylene glycol 3350 17 Gram(s) Oral two times a day  QUEtiapine 25 milliGRAM(s) Oral three times a day  traZODone 150 milliGRAM(s) Oral daily    MEDICATIONS  (PRN):  acetaminophen  Suppository .. 650 milliGRAM(s) Rectal every 8 hours PRN Temp greater or equal to 38C (100.4F)  melatonin 3 milliGRAM(s) Oral at bedtime PRN Insomnia          PHYSICAL EXAM:  GENERAL: NAD, well-groomed, well-developed  HEAD:  Atraumatic, Normocephalic  CHEST/LUNG: Clear to percussion bilaterally; No rales, rhonchi, wheezing, or rubs  HEART: Regular rate and rhythm; No murmurs, rubs, or gallops  ABDOMEN: Soft, Nontender, Nondistended; Bowel sounds present  EXTREMITIES:  2+ Peripheral Pulses, No clubbing, cyanosis, or edema  LYMPH: No lymphadenopathy noted  SKIN: No rashes or lesions    Care Discussed with Consultants/Other Providers [ ] YES  [ ] NO

## 2023-01-12 NOTE — PROGRESS NOTE ADULT - ASSESSMENT
51 yo female with a PMHx of traumatic subdural hemorrhage, dementia, HTN,  who was BIMBEMS from [facility] for failure to thrive. Facility states that for the past 3 days the pt has been spitting out her medications and food, and has significantly decreased her PO intake. Per the facility, the pt is A&Ox0 and nonverbal at baseline. She does occasionally follow commands. On examination here pt was not following commands    1 FTT  - poor po inatke  - calorie count   - will follow recs  - check repeat speech and swallow    2 Fever  - unclear etiology  - fu  blood cultures  - ID fu  - ABX as per ID     3 Hypernatremia  - monitor BMP  - IVF increased  - renal fu     4 HTN  - cw home meds  - DASH diet

## 2023-01-12 NOTE — SWALLOW BEDSIDE ASSESSMENT ADULT - ADDITIONAL RECOMMENDATIONS
1. This service to follow up as schedule permits for diet tolerance. 2. Medical team advised to reconsult if change in medical status and/or patient's tolerance to recommended PO diet.
Medical team further advised to reconsult as patient becomes medically optimized. This department remains available for goals of care discussion as needed.

## 2023-01-12 NOTE — SWALLOW BEDSIDE ASSESSMENT ADULT - ASR SWALLOW RECOMMEND DIAG
Objective testing NOT warranted given no overt signs of penetration/aspiration. Cinesophagram to objectively assess swallow given CT chest results and history of [place]'s disease

## 2023-01-13 DIAGNOSIS — Z51.5 ENCOUNTER FOR PALLIATIVE CARE: ICD-10-CM

## 2023-01-13 DIAGNOSIS — Z86.69 PERSONAL HISTORY OF OTHER DISEASES OF THE NERVOUS SYSTEM AND SENSE ORGANS: ICD-10-CM

## 2023-01-13 DIAGNOSIS — Z71.89 OTHER SPECIFIED COUNSELING: ICD-10-CM

## 2023-01-13 DIAGNOSIS — R53.81 OTHER MALAISE: ICD-10-CM

## 2023-01-13 LAB
ANION GAP SERPL CALC-SCNC: 10 MMOL/L — SIGNIFICANT CHANGE UP (ref 7–14)
BASOPHILS # BLD AUTO: 0.03 K/UL — SIGNIFICANT CHANGE UP (ref 0–0.2)
BASOPHILS NFR BLD AUTO: 0.4 % — SIGNIFICANT CHANGE UP (ref 0–2)
BUN SERPL-MCNC: 8 MG/DL — SIGNIFICANT CHANGE UP (ref 7–23)
CALCIUM SERPL-MCNC: 9.2 MG/DL — SIGNIFICANT CHANGE UP (ref 8.4–10.5)
CHLORIDE SERPL-SCNC: 102 MMOL/L — SIGNIFICANT CHANGE UP (ref 98–107)
CO2 SERPL-SCNC: 25 MMOL/L — SIGNIFICANT CHANGE UP (ref 22–31)
CREAT SERPL-MCNC: 0.34 MG/DL — LOW (ref 0.5–1.3)
EGFR: 123 ML/MIN/1.73M2 — SIGNIFICANT CHANGE UP
EOSINOPHIL # BLD AUTO: 0.11 K/UL — SIGNIFICANT CHANGE UP (ref 0–0.5)
EOSINOPHIL NFR BLD AUTO: 1.6 % — SIGNIFICANT CHANGE UP (ref 0–6)
GLUCOSE SERPL-MCNC: 85 MG/DL — SIGNIFICANT CHANGE UP (ref 70–99)
HCT VFR BLD CALC: 40.9 % — SIGNIFICANT CHANGE UP (ref 34.5–45)
HGB BLD-MCNC: 13.3 G/DL — SIGNIFICANT CHANGE UP (ref 11.5–15.5)
IANC: 4.87 K/UL — SIGNIFICANT CHANGE UP (ref 1.8–7.4)
IMM GRANULOCYTES NFR BLD AUTO: 0.6 % — SIGNIFICANT CHANGE UP (ref 0–0.9)
LYMPHOCYTES # BLD AUTO: 1.01 K/UL — SIGNIFICANT CHANGE UP (ref 1–3.3)
LYMPHOCYTES # BLD AUTO: 14.9 % — SIGNIFICANT CHANGE UP (ref 13–44)
MAGNESIUM SERPL-MCNC: 1.9 MG/DL — SIGNIFICANT CHANGE UP (ref 1.6–2.6)
MCHC RBC-ENTMCNC: 28.6 PG — SIGNIFICANT CHANGE UP (ref 27–34)
MCHC RBC-ENTMCNC: 32.5 GM/DL — SIGNIFICANT CHANGE UP (ref 32–36)
MCV RBC AUTO: 88 FL — SIGNIFICANT CHANGE UP (ref 80–100)
MONOCYTES # BLD AUTO: 0.72 K/UL — SIGNIFICANT CHANGE UP (ref 0–0.9)
MONOCYTES NFR BLD AUTO: 10.6 % — SIGNIFICANT CHANGE UP (ref 2–14)
NEUTROPHILS # BLD AUTO: 4.87 K/UL — SIGNIFICANT CHANGE UP (ref 1.8–7.4)
NEUTROPHILS NFR BLD AUTO: 71.9 % — SIGNIFICANT CHANGE UP (ref 43–77)
NRBC # BLD: 0 /100 WBCS — SIGNIFICANT CHANGE UP (ref 0–0)
NRBC # FLD: 0 K/UL — SIGNIFICANT CHANGE UP (ref 0–0)
PHOSPHATE SERPL-MCNC: 2.7 MG/DL — SIGNIFICANT CHANGE UP (ref 2.5–4.5)
PLATELET # BLD AUTO: 629 K/UL — HIGH (ref 150–400)
POTASSIUM SERPL-MCNC: 4.1 MMOL/L — SIGNIFICANT CHANGE UP (ref 3.5–5.3)
POTASSIUM SERPL-SCNC: 4.1 MMOL/L — SIGNIFICANT CHANGE UP (ref 3.5–5.3)
RBC # BLD: 4.65 M/UL — SIGNIFICANT CHANGE UP (ref 3.8–5.2)
RBC # FLD: 14.4 % — SIGNIFICANT CHANGE UP (ref 10.3–14.5)
SODIUM SERPL-SCNC: 137 MMOL/L — SIGNIFICANT CHANGE UP (ref 135–145)
WBC # BLD: 6.78 K/UL — SIGNIFICANT CHANGE UP (ref 3.8–10.5)
WBC # FLD AUTO: 6.78 K/UL — SIGNIFICANT CHANGE UP (ref 3.8–10.5)

## 2023-01-13 PROCEDURE — 99497 ADVNCD CARE PLAN 30 MIN: CPT | Mod: 25

## 2023-01-13 PROCEDURE — 99223 1ST HOSP IP/OBS HIGH 75: CPT

## 2023-01-13 PROCEDURE — 74230 X-RAY XM SWLNG FUNCJ C+: CPT | Mod: 26

## 2023-01-13 RX ADMIN — QUETIAPINE FUMARATE 25 MILLIGRAM(S): 200 TABLET, FILM COATED ORAL at 21:43

## 2023-01-13 RX ADMIN — AMLODIPINE BESYLATE 5 MILLIGRAM(S): 2.5 TABLET ORAL at 05:08

## 2023-01-13 RX ADMIN — CHLORHEXIDINE GLUCONATE 1 APPLICATION(S): 213 SOLUTION TOPICAL at 12:49

## 2023-01-13 RX ADMIN — Medication 325 MILLIGRAM(S): at 12:46

## 2023-01-13 RX ADMIN — PIPERACILLIN AND TAZOBACTAM 25 GRAM(S): 4; .5 INJECTION, POWDER, LYOPHILIZED, FOR SOLUTION INTRAVENOUS at 05:07

## 2023-01-13 RX ADMIN — Medication 1 APPLICATION(S): at 07:30

## 2023-01-13 RX ADMIN — Medication 150 MILLIGRAM(S): at 12:45

## 2023-01-13 RX ADMIN — Medication 1 APPLICATION(S): at 19:44

## 2023-01-13 RX ADMIN — QUETIAPINE FUMARATE 25 MILLIGRAM(S): 200 TABLET, FILM COATED ORAL at 05:08

## 2023-01-13 RX ADMIN — PIPERACILLIN AND TAZOBACTAM 25 GRAM(S): 4; .5 INJECTION, POWDER, LYOPHILIZED, FOR SOLUTION INTRAVENOUS at 12:43

## 2023-01-13 RX ADMIN — LEVETIRACETAM 400 MILLIGRAM(S): 250 TABLET, FILM COATED ORAL at 19:31

## 2023-01-13 RX ADMIN — LEVETIRACETAM 400 MILLIGRAM(S): 250 TABLET, FILM COATED ORAL at 05:07

## 2023-01-13 RX ADMIN — QUETIAPINE FUMARATE 25 MILLIGRAM(S): 200 TABLET, FILM COATED ORAL at 14:14

## 2023-01-13 NOTE — CHART NOTE - NSCHARTNOTEFT_GEN_A_CORE
The patient has unstageable sacral ulcer which requires positioning of the body in ways not feasible by an ordinary bed, and requires frequent changes in body position. The use of wedges and pillows have been tried and ruled out    [facility]  [id number]

## 2023-01-13 NOTE — CONSULT NOTE ADULT - PROBLEM SELECTOR RECOMMENDATION 4
Thank you for allowing us to participate in your patient's care. Palliative team signing off. Please page [phone] for any q's or c's.     Lary Kam D.O.   Palliative Medicine

## 2023-01-13 NOTE — GOALS OF CARE CONVERSATION - ADVANCED CARE PLANNING - CONVERSATION DETAILS
Referral to palliative care for complex decision making and symptom management in setting of advanced illness. Pt is a 53 yo female with a PMHx of traumatic subdural hemorrhage, hx of arslan's dx, dementia, HTN,  who was BIMBEMS from [facility] for failure to thrive.  Spoke to pt's cousin Jackelyn Langley who states pt's brother also had arslan's and passed away. She is aware of the terminal trajectory of [place]'s.   Advanced care planning extensively discussed. Reviewed the risks and benefits of resuscitative measures including cardiopulmonary resuscitation and mechanical ventilation at the end of life in the setting of advanced illness. Jackelyn understands that these interventions may pose more burden than benefit and is amenable to DNR/DNI and completion of MOLST form. MOLST form completed and placed on patient's medical record. Additionally, she was in agreement with no feeding tube.     Educated family on the philosophy of hospice care and explained the various settings and criteria in which hospice can be delivered (home vs. nursing home vs. inpatient hospice). Discussed the services provided under hospice services emphasizing the goal of elevating patient's quality of life and optimizing symptom management and avoiding unnecessary future hospitalizations. In addition to symptom management expertise, hospice provides supportive counseling services, nutritional support and chaplaincy services. Recommended referral for home hospice since Jackelyn is in the process of working on transitioning to home care plan. She does not want patient to return to NH unless it is [place] (where patient's brother is), however patient has not been accepted there. According to care coordination, [facility] has been sent and patient was not accepted. Discussed the option of [facility] for wound care, however Jackelyn declined this option as she does not believe her cousin is dying in the next few weeks.

## 2023-01-13 NOTE — CONSULT NOTE ADULT - PROBLEM SELECTOR RECOMMENDATION 2
Pt's baseline AOx0, nonverbal, bedbound at baseline.  PPSV 30%   Please assist with ADLs   Wound care recs appreciated

## 2023-01-13 NOTE — CONSULT NOTE ADULT - PROBLEM SELECTOR RECOMMENDATION 3
DNR/DNI, No feeding tube MOLST form completed   Patient's surrogate is her cousin, Jackelyn Gallagher - [phone]  Goal is for patient to transition home. Cousin states that there are multiple family members available to help patient at home along with aid.     I was present with the Bria Brown, Palliative SW, during goals of care discussion. I spent   16    minutes by telephone discussing advance care planning. Please see separate goals of care note.

## 2023-01-13 NOTE — CONSULT NOTE ADULT - PROBLEM SELECTOR RECOMMENDATION 9
Pt with Dolores's disease, has been at [facility] for 7 months.   Pt's baseline AOx0, nonverbal, bedbound at baseline.  S&S recs appreciated- puree with mildly thick liquids never used

## 2023-01-13 NOTE — SWALLOW VFSS/MBS ASSESSMENT ADULT - ORAL PHASE
Incomplete tongue to palate contact/Uncontrolled bolus / spillover in hypopharynx/Aspiration before swallow - cough Delayed oral transit time Delayed oral transit time/Incomplete tongue to palate contact/Uncontrolled bolus / spillover in hypopharynx

## 2023-01-13 NOTE — SWALLOW VFSS/MBS ASSESSMENT ADULT - ADDITIONAL RECOMMENDATIONS
1. This service to follow up as schedule permits for diet tolerance. 2. Medical team advised to reconsult this service if change in medical status and/or patient's tolerance to recommended PO diet.

## 2023-01-13 NOTE — CONSULT NOTE ADULT - SUBJECTIVE AND OBJECTIVE BOX
[facility] Palliative [facility]  Lary Kam, Palliative Care Attending  Contact Info: Page [phone] (including Nights/Weekends), message on Microsoft Teams (Lary Kam), or leave  at Palliative Office [phone] (non-urgent)     HPI:  53 yo female with a PMHx of traumatic subdural hemorrhage, dementia, HTN,  who was BIMBEMS from [facility] for failure to thrive. Facility states that for the past 3 days the pt has been spitting out her medications and food, and has significantly decreased her PO intake. Per the facility, the pt is A&Ox0 and nonverbal at baseline. She does occasionally follow commands. On examination here pt was not following commands (04 Jan 2023 10:38)    PERTINENT PM/SXH:   History of Marshall&#x27;s disease  Dementia  HTN (hypertension)  HLD (hyperlipidemia)    No significant past surgical history    FAMILY HISTORY:  No pertinent family history in first degree relatives    SOCIAL HISTORY:   Significant other/partner[ ]  Children[ ]  Judaism/Spirituality:  Substance hx:  [ ]   Tobacco hx:  [ ]   Alcohol hx: [ ]   Home Opioid hx:  [ ] I-Stop Reference No: [id number]  Living Situation: [ ]Home  [ x]Long term care  [ ]Rehab [ ]Other    ADVANCE DIRECTIVES:    DNR/MOLST  [ ]  Living Will  [ ]   DECISION MAKER(s):  [ ] Health Care Proxy(s)  [x ] Surrogate(s)  [ ] Guardian           Name(s): Jackelyn Gallagher Phone Number(s): [phone]    BASELINE (I)ADL(s) (prior to admission): Patient from [facility].   [place]: [ ]Total  [ ] Moderate [x ]Dependent    Allergies    No Known Allergies    Intolerances    MEDICATIONS  (STANDING):  amLODIPine   Tablet 5 milliGRAM(s) Oral daily  chlorhexidine 2% Cloths 1 Application(s) Topical daily  Dakins Solution - 1/4 Strength 1 Application(s) Topical two times a day  ferrous    sulfate 325 milliGRAM(s) Oral daily  levETIRAcetam  IVPB 500 milliGRAM(s) IV Intermittent every 12 hours  piperacillin/tazobactam IVPB.. 3.375 Gram(s) IV Intermittent every 8 hours  polyethylene glycol 3350 17 Gram(s) Oral two times a day  QUEtiapine 25 milliGRAM(s) Oral three times a day  traZODone 150 milliGRAM(s) Oral daily    MEDICATIONS  (PRN):  acetaminophen  Suppository .. 650 milliGRAM(s) Rectal every 8 hours PRN Temp greater or equal to 38C (100.4F)  melatonin 3 milliGRAM(s) Oral at bedtime PRN Insomnia    PRESENT SYMPTOMS: [x ]Unable to self-report  [ ] CPOT [ x] PAINADs [x ] RDOS  Source if other than patient:  [ ]Family   [ ]Team     Pain: [ ]yes [ ]no-  See PAIN AD score   QOL impact -   Location -                    Aggravating factors -  Quality -  Radiation -  Timing-  Severity (0-10 scale):  Minimal acceptable level/pain goal (0-10 scale):     CPOT:    https://www.[facility].org/getattachment/zwn08x85-1a7t-5r3h-1w4j-5659i8134k7b/Critical-Care-Pain-Observation-Tool-(CPOT)    Dyspnea:                           [ ]Mild [ ]Moderate [ ]Severe  Anxiety:                             [ ]Mild [ ]Moderate [ ]Severe  Fatigue:                             [ ]Mild [ ]Moderate [ ]Severe  Nausea:                             [ ]Mild [ ]Moderate [ ]Severe  Loss of appetite:              [ ]Mild [ ]Moderate [ ]Severe  Constipation:                    [ ]Mild [ ]Moderate [ ]Severe    Other Symptoms:  [ ]All other review of systems negative     PCSSQ[Palliative Care Spiritual Screening Question]   Severity (0-10):  Chaplaincy Referral: [ ] yes [ ] refused [ ] following [x ] deferred     Caregiver [place]? : [ ] yes [ ] no [ x] Deferred [ ] Declined             Social work referral [ ] Patient & Family Centered Care Referral [ ]  Anticipatory Grief present?:  [ ] yes [ ] no  [x ] Deferred                  Social work referral [ ] Patient & Family Centered Care Referral [ ]    PHYSICAL EXAM:  Vital Signs Last 24 Hrs  T(C): 36.4 (13 Jan 2023 06:44), Max: 36.4 (13 Jan 2023 06:44)  T(F): 97.5 (13 Jan 2023 06:44), Max: 97.5 (13 Jan 2023 06:44)  HR: 99 (13 Jan 2023 06:44) (87 - 99)  BP: 140/94 (13 Jan 2023 06:44) (140/94 - 143/95)  BP(mean): --  RR: 17 (13 Jan 2023 06:44) (17 - 18)  SpO2: 100% (13 Jan 2023 06:44) (100% - 100%)    Parameters below as of 13 Jan 2023 06:44  Patient On (Oxygen Delivery Method): room air     I&O's Summary    12 Jan 2023 07:01  -  13 Jan 2023 07:00  --------------------------------------------------------  IN: 200 mL / OUT: 0 mL / NET: 200 mL      GENERAL: [x ]Cachexia    [ x]Alert  [ ]Oriented x   [ ]Lethargic  [ ]Unarousable  [ ]Verbal  [ x]Non-Verbal  Behavioral:   [ ] Anxiety  [ ] Delirium [ ] Agitation [ x] Other  HEENT:  [ x]Normal   [ ]Dry mouth   [ ]ET Tube/Trach  [ ]Oral lesions  PULMONARY:   [ ]Clear [ ]Tachypnea  [ ]Audible excessive secretions   [ ]Rhonchi        [ ]Right [ ]Left [ ]Bilateral  [ ]Crackles        [ ]Right [ ]Left [ ]Bilateral  [ ]Wheezing     [ ]Right [ ]Left [ ]Bilateral  [x ]Diminished breath sounds [ ]right [ ]left [ x]bilateral  CARDIOVASCULAR:    [ x]Regular [ ]Irregular [ ]Tachy  [ ]Gary [ ]Murmur [ ]Other  GASTROINTESTINAL:  [x ]Soft  [ ]Distended   [ ]+BS  [ x]Non tender [ ]Tender  [ ]Other [ ]PEG [ ]OGT/ NGT  Last BM: fecal incontinence   GENITOURINARY:  [ ]Normal [x ] Incontinent   [ ]Oliguria/Anuria   [ ]Lugo  MUSCULOSKELETAL:   [ ]Normal   [ ]Weakness  [x ]Bed/Wheelchair bound [ ]Edema  NEUROLOGIC:   [ ]No focal deficits  [x ]Cognitive impairment  [x ]Dysphagia [ ]Dysarthria [ ]Paresis [ ]Other   SKIN: Please see flowsheets - unstageable sacral wound   [ ]Normal  [ ]Rash  [ x]Other  [x ]Pressure ulcer(s)       Present on admission [ ]y [ ]n    CRITICAL CARE:  [ ] Shock Present  [ ]Septic [ ]Cardiogenic [ ]Neurologic [ ]Hypovolemic  [ ]  Vasopressors [ ]  Inotropes   [ ]Respiratory failure present [ ]Mechanical ventilation [ ]Non-invasive ventilatory support [ ]High flow    [ ]Acute  [ ]Chronic [ ]Hypoxic  [ ]Hypercarbic [ ]Other  [ ]Other organ failure     LABS:                        13.3   6.78  )-----------( 629      ( 13 Jan 2023 06:40 )             40.9   01-13    137  |  102  |  8   ----------------------------<  85  4.1   |  25  |  0.34<L>    Ca    9.2      13 Jan 2023 06:40  Phos  2.7     01-13  Mg     1.90     01-13      RADIOLOGY & ADDITIONAL STUDIES: < from: CT Abdomen and Pelvis w/ IV Cont (01.07.23 @ 11:47) >  IMPRESSION:  Sacral decubitus ulcer is increased in size compared with prior imaging   dated 4/20/2022. Increased sacral sclerosis with foci of erosion   consistent with chronic osteomyelitis.    Air extends from the level of the decubitus into the spinal canal. No   associated fluid collection.    Mild pneumomediastinum, etiology unclear. No mediastinal fluid or   inflammation.    < end of copied text >      PROTEIN CALORIE MALNUTRITION PRESENT: [ ]mild [ ]moderate [x ]severe [ ]underweight [ ]morbid obesity  https://www.andeal.org/vault/2440/web/files/ONC/Table_Clinical%20Characteristics%20to%20Document%20Malnutrition-White%20JV%20et%20al%202012.pdf    Height (cm): 162.6 (08-05-22 @ 16:44)  Weight (kg): 44.1 (08-05-22 @ 16:44)  BMI (kg/m2): 16.7 (08-05-22 @ 16:44)    [ x]PPSV2 < or = to 30% [ ]significant weight loss  [ ]poor nutritional intake  [ ]anasarca[ ]Artificial Nutrition      Other REFERRALS:  [ ]Hospice  [ ]Child Life  [ ]Social Work  [ ]Case management [ ]Holistic Therapy

## 2023-01-13 NOTE — SWALLOW VFSS/MBS ASSESSMENT ADULT - COMMENTS
Hospitalist note 1/12 "53 yo female with a PMHx of traumatic subdural hemorrhage, arslan's disease, dementia, HTN,  who was BIBEMS from [facility] for failure to thrive. "    Of Note: Patient seen for clinical swallow assessment on 1/5 and 1/12 at which time puree with mildly thick liquids and a Cinesophagram was recommended to objectively assess swallow (please see notes).    Patient received in Radiology suite this AM for a Cinesophagram. Patient non-verbal at baseline per charting, however, patient receptive to PO trials to complete Cinesophagram.

## 2023-01-13 NOTE — SWALLOW VFSS/MBS ASSESSMENT ADULT - RECOMMENDED CONSISTENCY
1) Puree with Mildly thick liquids via teaspoon  2) FEEDING/SWALLOWING GUIDELINES: Upright positioning, Puree and Mildly thick liquids via teaspoon presentation, alternate puree and mildly thick liquids, feed slowly  3) Aspiration/reflux precautions

## 2023-01-13 NOTE — CONSULT NOTE ADULT - ASSESSMENT
complete note to follow    dnr/dni, no feeding tube,  53 yo female with a PMHx of traumatic subdural hemorrhage, [place]'s dementia, HTN,  who was BIMBEMS from [facility] for failure to thrive. Palliative consulted for complex decision making in setting of advanced illness.

## 2023-01-13 NOTE — PROGRESS NOTE ADULT - ASSESSMENT
53 yo female with a PMHx of traumatic subdural hemorrhage, dementia, HTN,  who was BIMBEMS from [facility] for failure to thrive. Facility states that for the past 3 days the pt has been spitting out her medications and food, and has significantly decreased her PO intake. Per the facility, the pt is A&Ox0 and nonverbal at baseline. She does occasionally follow commands. On examination here pt was not following commands    1 FTT  - poor po inatke  - calorie count   - will follow recs  - check repeat speech and swallow    2 Fever  - unclear etiology  - fu  blood cultures  - ID fu  - ABX as per ID     3 Hypernatremia  - monitor BMP  - IVF  - renal fu     4 HTN  - cw home meds  - DASH diet       Palliative fu for GOC

## 2023-01-13 NOTE — PROGRESS NOTE ADULT - SUBJECTIVE AND OBJECTIVE BOX
Patient is a 52y old  Female who presents with a chief complaint of FTT (13 Jan 2023 10:44)    Date of servie : 01-13-23 @ 17:04  INTERVAL HPI/OVERNIGHT EVENTS:  T(C): 36.7 (01-13-23 @ 14:32), Max: 36.7 (01-13-23 @ 14:32)  HR: 106 (01-13-23 @ 14:32) (87 - 106)  BP: 140/94 (01-13-23 @ 06:44) (140/94 - 142/98)  RR: 18 (01-13-23 @ 14:32) (17 - 18)  SpO2: 100% (01-13-23 @ 14:32) (100% - 100%)  Wt(kg): --  I&O's Summary    12 Jan 2023 07:01  -  13 Jan 2023 07:00  --------------------------------------------------------  IN: 200 mL / OUT: 0 mL / NET: 200 mL        LABS:                        13.3   6.78  )-----------( 629      ( 13 Jan 2023 06:40 )             40.9     01-13    137  |  102  |  8   ----------------------------<  85  4.1   |  25  |  0.34<L>    Ca    9.2      13 Jan 2023 06:40  Phos  2.7     01-13  Mg     1.90     01-13          CAPILLARY BLOOD GLUCOSE                MEDICATIONS  (STANDING):  amLODIPine   Tablet 5 milliGRAM(s) Oral daily  chlorhexidine 2% Cloths 1 Application(s) Topical daily  Dakins Solution - 1/4 Strength 1 Application(s) Topical two times a day  ferrous    sulfate 325 milliGRAM(s) Oral daily  levETIRAcetam  IVPB 500 milliGRAM(s) IV Intermittent every 12 hours  piperacillin/tazobactam IVPB.. 3.375 Gram(s) IV Intermittent every 8 hours  polyethylene glycol 3350 17 Gram(s) Oral two times a day  QUEtiapine 25 milliGRAM(s) Oral three times a day  traZODone 150 milliGRAM(s) Oral daily    MEDICATIONS  (PRN):  acetaminophen  Suppository .. 650 milliGRAM(s) Rectal every 8 hours PRN Temp greater or equal to 38C (100.4F)  melatonin 3 milliGRAM(s) Oral at bedtime PRN Insomnia          PHYSICAL EXAM:  GENERAL: frail  CHEST/LUNG: Clear to percussion bilaterally; No rales, rhonchi, wheezing, or rubs  HEART: Regular rate and rhythm; No murmurs, rubs, or gallops  ABDOMEN: Soft, Nontender, Nondistended; Bowel sounds present  EXTREMITIES: edema +    Care Discussed with Consultants/Other Providers [ ] YES  [ ] NO

## 2023-01-13 NOTE — SWALLOW VFSS/MBS ASSESSMENT ADULT - DIAGNOSTIC IMPRESSIONS
1. Moderate oral dysphagia for puree, moderately thick liquids, mildly thick liquids and thin liquid textures marked by reduced stripping off teaspoon, reduced oral containment for mildly thick/thin liquids due to weak labial seal with mild anterior spillage, moderately slow/delayed collection and slow/delayed tongue motion for anterior to posterior transport. There was premature spillage to the hypopharynx due to reduced tongue to palate seal for mildly thick liquids/thin liquids. Mild oral residue located on the floor of mouth/tongue surface post primary swallow (puree/moderately thick liquids>mildly thick/thin liquids).  2. Mild pharyngeal dysphagia for puree, moderately thick liquids, and mildly thick liquids marked by a delayed pharyngeal swallow trigger (bolus head at the valleculae across trials) with reduced base of tongue retraction, reduced hyolaryngeal elevation, reduced epiglottic deflection, adequate pharyngeal contractility and incomplete closure of the laryngeal vestibule. There was mild pharyngeal residue located primarily in the valleculae for puree/moderately thick liquids at which time a mildly thick liquid wash assists with pharyngeal clearance. There was Trace Laryngeal Penetration during the swallow above the level of the vocal folds for mildly thick liquids. No Aspiration viewed before, during or after the swallow for puree and moderately thick liquids.   3. Severe pharyngeal dysphagia for thin liquids marked by a delayed pharyngeal swallow trigger (bolus head at the valleculae) with reduced base of tongue retraction, reduced hyolaryngeal elevation, reduced epiglottic deflection, adequate pharyngeal contractility and incomplete closure of the laryngeal vestibule. Adequate pharyngeal clearance post primary swallow. There was Aspiration before the swallow due to premature spillage for thin liquids. Patient with weak cough response.

## 2023-01-13 NOTE — SWALLOW VFSS/MBS ASSESSMENT ADULT - ROSENBEK'S PENETRATION ASPIRATION SCALE
(2) contrast enters airway, remains above the vocal cords, no residue remains (penetration) (7) contrast passes glottis, visible subglottic residue remains despite patient’s response (aspiration) (1) no aspiration, contrast does not enter airway

## 2023-01-14 LAB
ANION GAP SERPL CALC-SCNC: 12 MMOL/L — SIGNIFICANT CHANGE UP (ref 7–14)
BUN SERPL-MCNC: 9 MG/DL — SIGNIFICANT CHANGE UP (ref 7–23)
CALCIUM SERPL-MCNC: 9.4 MG/DL — SIGNIFICANT CHANGE UP (ref 8.4–10.5)
CHLORIDE SERPL-SCNC: 100 MMOL/L — SIGNIFICANT CHANGE UP (ref 98–107)
CO2 SERPL-SCNC: 23 MMOL/L — SIGNIFICANT CHANGE UP (ref 22–31)
CREAT SERPL-MCNC: 0.36 MG/DL — LOW (ref 0.5–1.3)
EGFR: 121 ML/MIN/1.73M2 — SIGNIFICANT CHANGE UP
GLUCOSE SERPL-MCNC: 61 MG/DL — LOW (ref 70–99)
MAGNESIUM SERPL-MCNC: 1.9 MG/DL — SIGNIFICANT CHANGE UP (ref 1.6–2.6)
PHOSPHATE SERPL-MCNC: 2.9 MG/DL — SIGNIFICANT CHANGE UP (ref 2.5–4.5)
POTASSIUM SERPL-MCNC: 4.1 MMOL/L — SIGNIFICANT CHANGE UP (ref 3.5–5.3)
POTASSIUM SERPL-SCNC: 4.1 MMOL/L — SIGNIFICANT CHANGE UP (ref 3.5–5.3)
SODIUM SERPL-SCNC: 135 MMOL/L — SIGNIFICANT CHANGE UP (ref 135–145)

## 2023-01-14 RX ADMIN — PIPERACILLIN AND TAZOBACTAM 25 GRAM(S): 4; .5 INJECTION, POWDER, LYOPHILIZED, FOR SOLUTION INTRAVENOUS at 12:10

## 2023-01-14 RX ADMIN — QUETIAPINE FUMARATE 25 MILLIGRAM(S): 200 TABLET, FILM COATED ORAL at 22:20

## 2023-01-14 RX ADMIN — PIPERACILLIN AND TAZOBACTAM 25 GRAM(S): 4; .5 INJECTION, POWDER, LYOPHILIZED, FOR SOLUTION INTRAVENOUS at 19:04

## 2023-01-14 RX ADMIN — QUETIAPINE FUMARATE 25 MILLIGRAM(S): 200 TABLET, FILM COATED ORAL at 05:17

## 2023-01-14 RX ADMIN — Medication 1 APPLICATION(S): at 05:29

## 2023-01-14 RX ADMIN — LEVETIRACETAM 400 MILLIGRAM(S): 250 TABLET, FILM COATED ORAL at 05:16

## 2023-01-14 RX ADMIN — AMLODIPINE BESYLATE 5 MILLIGRAM(S): 2.5 TABLET ORAL at 05:17

## 2023-01-14 RX ADMIN — POLYETHYLENE GLYCOL 3350 17 GRAM(S): 17 POWDER, FOR SOLUTION ORAL at 17:21

## 2023-01-14 RX ADMIN — LEVETIRACETAM 400 MILLIGRAM(S): 250 TABLET, FILM COATED ORAL at 17:21

## 2023-01-14 RX ADMIN — Medication 1 APPLICATION(S): at 17:17

## 2023-01-14 RX ADMIN — PIPERACILLIN AND TAZOBACTAM 25 GRAM(S): 4; .5 INJECTION, POWDER, LYOPHILIZED, FOR SOLUTION INTRAVENOUS at 05:00

## 2023-01-14 RX ADMIN — Medication 150 MILLIGRAM(S): at 12:10

## 2023-01-14 RX ADMIN — Medication 325 MILLIGRAM(S): at 12:10

## 2023-01-14 RX ADMIN — QUETIAPINE FUMARATE 25 MILLIGRAM(S): 200 TABLET, FILM COATED ORAL at 13:32

## 2023-01-14 RX ADMIN — CHLORHEXIDINE GLUCONATE 1 APPLICATION(S): 213 SOLUTION TOPICAL at 12:11

## 2023-01-14 NOTE — PROGRESS NOTE ADULT - SUBJECTIVE AND OBJECTIVE BOX
Patient is a 52y old  Female who presents with a chief complaint of FTT (13 Jan 2023 17:04)    Date of servie : 01-14-23 @ 12:44  INTERVAL HPI/OVERNIGHT EVENTS:  T(C): 36.6 (01-14-23 @ 05:20), Max: 36.8 (01-13-23 @ 22:28)  HR: 87 (01-14-23 @ 05:20) (87 - 111)  BP: 140/91 (01-14-23 @ 05:20) (127/86 - 140/91)  RR: 16 (01-14-23 @ 05:20) (16 - 18)  SpO2: 100% (01-14-23 @ 05:20) (98% - 100%)  Wt(kg): --  I&O's Summary      LABS:                        13.3   6.78  )-----------( 629      ( 13 Jan 2023 06:40 )             40.9     01-14    135  |  100  |  9   ----------------------------<  61<L>  4.1   |  23  |  0.36<L>    Ca    9.4      14 Jan 2023 05:10  Phos  2.9     01-14  Mg     1.90     01-14          CAPILLARY BLOOD GLUCOSE                MEDICATIONS  (STANDING):  amLODIPine   Tablet 5 milliGRAM(s) Oral daily  chlorhexidine 2% Cloths 1 Application(s) Topical daily  Dakins Solution - 1/4 Strength 1 Application(s) Topical two times a day  ferrous    sulfate 325 milliGRAM(s) Oral daily  levETIRAcetam  IVPB 500 milliGRAM(s) IV Intermittent every 12 hours  piperacillin/tazobactam IVPB.. 3.375 Gram(s) IV Intermittent every 8 hours  polyethylene glycol 3350 17 Gram(s) Oral two times a day  QUEtiapine 25 milliGRAM(s) Oral three times a day  traZODone 150 milliGRAM(s) Oral daily    MEDICATIONS  (PRN):  acetaminophen  Suppository .. 650 milliGRAM(s) Rectal every 8 hours PRN Temp greater or equal to 38C (100.4F)  melatonin 3 milliGRAM(s) Oral at bedtime PRN Insomnia          PHYSICAL EXAM:  GENERAL: frail  CHEST/LUNG: Clear to percussion bilaterally; No rales, rhonchi, wheezing, or rubs  HEART: Regular rate and rhythm; No murmurs, rubs, or gallops  ABDOMEN: Soft, Nontender, Nondistended; Bowel sounds present  EXTREMITIES:  edema +    Care Discussed with Consultants/Other Providers [ ] YES  [ ] NO

## 2023-01-14 NOTE — PROGRESS NOTE ADULT - ASSESSMENT
51 yo female with a PMHx of traumatic subdural hemorrhage, dementia, HTN,  who was BIMBEMS from [facility] for failure to thrive. Facility states that for the past 3 days the pt has been spitting out her medications and food, and has significantly decreased her PO intake. Per the facility, the pt is A&Ox0 and nonverbal at baseline. She does occasionally follow commands. On examination here pt was not following commands    1 FTT  - poor po inatke  - calorie count   - will follow recs  - diet as per speech and swallow     2 Fever  - unclear etiology  - fu  blood cultures  - ID fu  - ABX as per ID     3 Hypernatremia  - monitor BMP  - IVF increased  - renal fu     4 HTN  - cw home meds  - DASH diet

## 2023-01-15 LAB
ANION GAP SERPL CALC-SCNC: 11 MMOL/L — SIGNIFICANT CHANGE UP (ref 7–14)
BUN SERPL-MCNC: 7 MG/DL — SIGNIFICANT CHANGE UP (ref 7–23)
CALCIUM SERPL-MCNC: 9.4 MG/DL — SIGNIFICANT CHANGE UP (ref 8.4–10.5)
CHLORIDE SERPL-SCNC: 98 MMOL/L — SIGNIFICANT CHANGE UP (ref 98–107)
CO2 SERPL-SCNC: 24 MMOL/L — SIGNIFICANT CHANGE UP (ref 22–31)
CREAT SERPL-MCNC: 0.29 MG/DL — LOW (ref 0.5–1.3)
EGFR: 128 ML/MIN/1.73M2 — SIGNIFICANT CHANGE UP
GLUCOSE SERPL-MCNC: 92 MG/DL — SIGNIFICANT CHANGE UP (ref 70–99)
MAGNESIUM SERPL-MCNC: 1.9 MG/DL — SIGNIFICANT CHANGE UP (ref 1.6–2.6)
PHOSPHATE SERPL-MCNC: 2.7 MG/DL — SIGNIFICANT CHANGE UP (ref 2.5–4.5)
POTASSIUM SERPL-MCNC: 4.1 MMOL/L — SIGNIFICANT CHANGE UP (ref 3.5–5.3)
POTASSIUM SERPL-SCNC: 4.1 MMOL/L — SIGNIFICANT CHANGE UP (ref 3.5–5.3)
SODIUM SERPL-SCNC: 133 MMOL/L — LOW (ref 135–145)

## 2023-01-15 RX ORDER — ASCORBIC ACID 60 MG
500 TABLET,CHEWABLE ORAL DAILY
Refills: 0 | Status: DISCONTINUED | OUTPATIENT
Start: 2023-01-15 | End: 2023-02-02

## 2023-01-15 RX ADMIN — Medication 1 APPLICATION(S): at 05:12

## 2023-01-15 RX ADMIN — Medication 650 MILLIGRAM(S): at 23:02

## 2023-01-15 RX ADMIN — Medication 325 MILLIGRAM(S): at 11:50

## 2023-01-15 RX ADMIN — LEVETIRACETAM 400 MILLIGRAM(S): 250 TABLET, FILM COATED ORAL at 05:48

## 2023-01-15 RX ADMIN — PIPERACILLIN AND TAZOBACTAM 25 GRAM(S): 4; .5 INJECTION, POWDER, LYOPHILIZED, FOR SOLUTION INTRAVENOUS at 05:10

## 2023-01-15 RX ADMIN — AMLODIPINE BESYLATE 5 MILLIGRAM(S): 2.5 TABLET ORAL at 05:12

## 2023-01-15 RX ADMIN — LEVETIRACETAM 400 MILLIGRAM(S): 250 TABLET, FILM COATED ORAL at 19:34

## 2023-01-15 RX ADMIN — QUETIAPINE FUMARATE 25 MILLIGRAM(S): 200 TABLET, FILM COATED ORAL at 14:13

## 2023-01-15 RX ADMIN — Medication 650 MILLIGRAM(S): at 11:51

## 2023-01-15 RX ADMIN — QUETIAPINE FUMARATE 25 MILLIGRAM(S): 200 TABLET, FILM COATED ORAL at 05:12

## 2023-01-15 RX ADMIN — Medication 1 APPLICATION(S): at 19:34

## 2023-01-15 RX ADMIN — CHLORHEXIDINE GLUCONATE 1 APPLICATION(S): 213 SOLUTION TOPICAL at 12:22

## 2023-01-15 RX ADMIN — Medication 500 MILLIGRAM(S): at 19:34

## 2023-01-15 RX ADMIN — QUETIAPINE FUMARATE 25 MILLIGRAM(S): 200 TABLET, FILM COATED ORAL at 22:44

## 2023-01-15 RX ADMIN — POLYETHYLENE GLYCOL 3350 17 GRAM(S): 17 POWDER, FOR SOLUTION ORAL at 19:34

## 2023-01-15 RX ADMIN — Medication 650 MILLIGRAM(S): at 12:03

## 2023-01-15 RX ADMIN — Medication 1 TABLET(S): at 19:34

## 2023-01-15 RX ADMIN — Medication 150 MILLIGRAM(S): at 11:51

## 2023-01-15 NOTE — PROGRESS NOTE ADULT - SUBJECTIVE AND OBJECTIVE BOX
Patient is a 52y old  Female who presents with a chief complaint of FTT (14 Jan 2023 12:43)    Date of servie : 01-15-23 @ 15:05  INTERVAL HPI/OVERNIGHT EVENTS:  T(C): 36.2 (01-15-23 @ 12:43), Max: 36.8 (01-14-23 @ 22:01)  HR: 101 (01-15-23 @ 12:43) (85 - 101)  BP: 141/102 (01-15-23 @ 12:43) (133/91 - 141/102)  RR: 18 (01-15-23 @ 12:43) (17 - 18)  SpO2: 100% (01-15-23 @ 12:43) (98% - 100%)  Wt(kg): --  I&O's Summary      LABS:    01-15    133<L>  |  98  |  7   ----------------------------<  92  4.1   |  24  |  0.29<L>    Ca    9.4      15 Tonny 2023 08:00  Phos  2.7     01-15  Mg     1.90     01-15          CAPILLARY BLOOD GLUCOSE                MEDICATIONS  (STANDING):  amLODIPine   Tablet 5 milliGRAM(s) Oral daily  chlorhexidine 2% Cloths 1 Application(s) Topical daily  Dakins Solution - 1/4 Strength 1 Application(s) Topical two times a day  ferrous    sulfate 325 milliGRAM(s) Oral daily  levETIRAcetam  IVPB 500 milliGRAM(s) IV Intermittent every 12 hours  polyethylene glycol 3350 17 Gram(s) Oral two times a day  QUEtiapine 25 milliGRAM(s) Oral three times a day  traZODone 150 milliGRAM(s) Oral daily    MEDICATIONS  (PRN):  acetaminophen  Suppository .. 650 milliGRAM(s) Rectal every 8 hours PRN Temp greater or equal to 38C (100.4F)  melatonin 3 milliGRAM(s) Oral at bedtime PRN Insomnia          PHYSICAL EXAM:  GENERAL: NAD, well-groomed, well-developed  HEAD:  Atraumatic, Normocephalic  CHEST/LUNG: Clear to percussion bilaterally; No rales, rhonchi, wheezing, or rubs  HEART: Regular rate and rhythm; No murmurs, rubs, or gallops  ABDOMEN: Soft, Nontender, Nondistended; Bowel sounds present  EXTREMITIES:  2+ Peripheral Pulses, No clubbing, cyanosis, or edema  LYMPH: No lymphadenopathy noted  SKIN: No rashes or lesions    Care Discussed with Consultants/Other Providers [ ] YES  [ ] NO

## 2023-01-15 NOTE — PROGRESS NOTE ADULT - ASSESSMENT
53 yo female with a PMHx of traumatic subdural hemorrhage, dementia, HTN,  who was BIMBEMS from [facility] for failure to thrive. Facility states that for the past 3 days the pt has been spitting out her medications and food, and has significantly decreased her PO intake. Per the facility, the pt is A&Ox0 and nonverbal at baseline. She does occasionally follow commands. On examination here pt was not following commands    1 FTT  - poor po inatke  - calorie count   - will follow recs  - diet as per speech and swallow     2 Fever  - unclear etiology  - fu  blood cultures  - ID fu  - ABX as per ID     3 Hypernatremia  - monitor BMP  - IVF increased  - renal fu     4 HTN  - cw home meds  - DASH diet

## 2023-01-15 NOTE — CHART NOTE - NSCHARTNOTEFT_GEN_A_CORE
Source: Patient [ ]    Family [ ]     other [x ]RN, Chart Review    Current Diet : Diet, Pureed:   Mildly Thick Liquids (MILDTHICKLIQS) (01-13-23 @ 09:45) [Active]    PO intake:  < 50% [x]   Height (cm): 162.6 (05 Aug 2022 16:44)  Weight (kg): 38 (14 Jan 2023 05:20), 38.8kg (per RD assessment dated 1/11/2023)  BMI (kg/m2): 14.4 (14 Jan 2023 05:20)    Nutrition Note: 51 yo female with a PMHx of traumatic subdural hemorrhage, dementia, HTN,  who was BIMBEMS from [facility] for failure to thrive. Facility states that for the past 3 days the pt has been spitting out her medications and food, and has significantly decreased her PO intake. Per the facility, the pt is A&Ox0 and nonverbal at baseline. She does occasionally follow commands. On examination here pt was not following commands, per chart.   Unable to conduct nutrition interview 2/2 decreased cognition. Collateral obtained from RN and extensive chart review. RN reports patient consumes <50% of meals, requires assistance in feeding. No reports of any nausea, vomiting, diarrhea, constipation at this time. Last bowel movement 1/7/2023(?), per flow sheet. On bowel regimen. As per speech evaluation dated 1/13/2023, recommended pureed with mildly thick liquids diet consistency. No reports of any difficulty chewing/swallowing current diet consistency. As per GOC note dated 1/13/2023, patient is DNI/DNR, no feeding tube. Patient is receiving Magic cup supplement 1x/day and Hormel shake 2x/day for nutrient support. Currently on ferrous sulfate for micronutrient support.     __________________ Pertinent Medications__________________   MEDICATIONS  (STANDING):  amLODIPine   Tablet 5 milliGRAM(s) Oral daily  chlorhexidine 2% Cloths 1 Application(s) Topical daily  Dakins Solution - 1/4 Strength 1 Application(s) Topical two times a day  ferrous    sulfate 325 milliGRAM(s) Oral daily  levETIRAcetam  IVPB 500 milliGRAM(s) IV Intermittent every 12 hours  polyethylene glycol 3350 17 Gram(s) Oral two times a day  QUEtiapine 25 milliGRAM(s) Oral three times a day  traZODone 150 milliGRAM(s) Oral daily    MEDICATIONS  (PRN):  acetaminophen  Suppository .. 650 milliGRAM(s) Rectal every 8 hours PRN Temp greater or equal to 38C (100.4F)  melatonin 3 milliGRAM(s) Oral at bedtime PRN Insomnia      __________________ Pertinent Labs__________________   01-15 Na133 mmol/L<L> Glu 92 mg/dL K+ 4.1 mmol/L Cr  0.29 mg/dL<L> BUN 7 mg/dL 01-15 Phos 2.7 mg/dL      Edema: As per flow sheet, no edema noted at this time.   Skin: As per flow sheet, patient has stage IV pressure injury to sacrum.     Estimated Needs:   [x ] no change since previous assessment    Previous Nutrition Diagnosis:   [x] Malnutrition   Nutrition Diagnosis is [x] ongoing   New Nutrition Diagnosis: [x ] not applicable    Interventions:   Recommend  [x ] Continue with pureed, mildly thick liquid, per speech recommendation, align with GOC.   [x ] Nutrition department continue to provide Magic Cup 1x/day(290kcal, 9gm protein), Hormel shake 2x/day (1040kcal, 44gm protein) for nutrient support.   [x ] Consider appetite stimulant.  [x ] Add multivitamin and vitamin c, to promote wound healing.   [x ] Encourage PO intake and honor food preferences as able.     Monitoring and Evaluation:   [ x] PO intake [x ] Tolerance to diet prescription [x ] weights [ x] follow up per protocol  [x ] other: bowel movement, skin integrity, labs.

## 2023-01-16 LAB
ANION GAP SERPL CALC-SCNC: 10 MMOL/L — SIGNIFICANT CHANGE UP (ref 7–14)
BUN SERPL-MCNC: 6 MG/DL — LOW (ref 7–23)
CALCIUM SERPL-MCNC: 9.4 MG/DL — SIGNIFICANT CHANGE UP (ref 8.4–10.5)
CHLORIDE SERPL-SCNC: 102 MMOL/L — SIGNIFICANT CHANGE UP (ref 98–107)
CO2 SERPL-SCNC: 26 MMOL/L — SIGNIFICANT CHANGE UP (ref 22–31)
CREAT SERPL-MCNC: 0.35 MG/DL — LOW (ref 0.5–1.3)
EGFR: 122 ML/MIN/1.73M2 — SIGNIFICANT CHANGE UP
GLUCOSE SERPL-MCNC: 76 MG/DL — SIGNIFICANT CHANGE UP (ref 70–99)
MAGNESIUM SERPL-MCNC: 1.9 MG/DL — SIGNIFICANT CHANGE UP (ref 1.6–2.6)
PHOSPHATE SERPL-MCNC: 2.9 MG/DL — SIGNIFICANT CHANGE UP (ref 2.5–4.5)
POTASSIUM SERPL-MCNC: 4.2 MMOL/L — SIGNIFICANT CHANGE UP (ref 3.5–5.3)
POTASSIUM SERPL-SCNC: 4.2 MMOL/L — SIGNIFICANT CHANGE UP (ref 3.5–5.3)
SODIUM SERPL-SCNC: 138 MMOL/L — SIGNIFICANT CHANGE UP (ref 135–145)

## 2023-01-16 RX ORDER — ACETAMINOPHEN 500 MG
650 TABLET ORAL EVERY 6 HOURS
Refills: 0 | Status: DISCONTINUED | OUTPATIENT
Start: 2023-01-16 | End: 2023-02-02

## 2023-01-16 RX ADMIN — Medication 500 MILLIGRAM(S): at 12:13

## 2023-01-16 RX ADMIN — Medication 325 MILLIGRAM(S): at 11:44

## 2023-01-16 RX ADMIN — LEVETIRACETAM 400 MILLIGRAM(S): 250 TABLET, FILM COATED ORAL at 17:58

## 2023-01-16 RX ADMIN — Medication 1 TABLET(S): at 12:13

## 2023-01-16 RX ADMIN — Medication 650 MILLIGRAM(S): at 12:13

## 2023-01-16 RX ADMIN — Medication 150 MILLIGRAM(S): at 11:45

## 2023-01-16 RX ADMIN — LEVETIRACETAM 400 MILLIGRAM(S): 250 TABLET, FILM COATED ORAL at 06:32

## 2023-01-16 RX ADMIN — Medication 1 APPLICATION(S): at 06:21

## 2023-01-16 RX ADMIN — CHLORHEXIDINE GLUCONATE 1 APPLICATION(S): 213 SOLUTION TOPICAL at 11:44

## 2023-01-16 RX ADMIN — QUETIAPINE FUMARATE 25 MILLIGRAM(S): 200 TABLET, FILM COATED ORAL at 21:43

## 2023-01-16 RX ADMIN — Medication 1 APPLICATION(S): at 17:59

## 2023-01-16 RX ADMIN — POLYETHYLENE GLYCOL 3350 17 GRAM(S): 17 POWDER, FOR SOLUTION ORAL at 06:42

## 2023-01-16 RX ADMIN — Medication 650 MILLIGRAM(S): at 21:47

## 2023-01-16 RX ADMIN — Medication 3 MILLIGRAM(S): at 21:43

## 2023-01-16 RX ADMIN — QUETIAPINE FUMARATE 25 MILLIGRAM(S): 200 TABLET, FILM COATED ORAL at 06:22

## 2023-01-16 RX ADMIN — QUETIAPINE FUMARATE 25 MILLIGRAM(S): 200 TABLET, FILM COATED ORAL at 13:20

## 2023-01-16 RX ADMIN — Medication 650 MILLIGRAM(S): at 13:13

## 2023-01-16 RX ADMIN — Medication 650 MILLIGRAM(S): at 22:47

## 2023-01-16 RX ADMIN — Medication 650 MILLIGRAM(S): at 00:00

## 2023-01-16 RX ADMIN — AMLODIPINE BESYLATE 5 MILLIGRAM(S): 2.5 TABLET ORAL at 06:27

## 2023-01-16 NOTE — PROGRESS NOTE ADULT - ASSESSMENT
51 yo female with a PMHx of traumatic subdural hemorrhage, dementia, HTN,  who was BIMBEMS from [facility] for failure to thrive. Facility states that for the past 3 days the pt has been spitting out her medications and food, and has significantly decreased her PO intake. Per the facility, the pt is A&Ox0 and nonverbal at baseline. She does occasionally follow commands. On examination here pt was not following commands    1 FTT  - poor po inatke  - calorie count   - will follow recs  - diet as per speech and swallow     2 Fever  - unclear etiology  - fu  blood cultures  - ID fu  -  finished ABX    3 Hypernatremia  - monitor BMP  - IVF   - renal fu     4 HTN  - cw home meds  - DASH diet       dc planning  palliative fu

## 2023-01-16 NOTE — PROGRESS NOTE ADULT - SUBJECTIVE AND OBJECTIVE BOX
Patient is a 52y old  Female who presents with a chief complaint of FTT (15 Tonny 2023 15:04)    Date of servie : 01-16-23 @ 13:07  INTERVAL HPI/OVERNIGHT EVENTS:  T(C): 36.3 (01-16-23 @ 06:25), Max: 36.3 (01-15-23 @ 23:15)  HR: 93 (01-16-23 @ 06:25) (88 - 93)  BP: 129/97 (01-16-23 @ 06:25) (129/97 - 138/58)  RR: 18 (01-16-23 @ 06:25) (18 - 18)  SpO2: 100% (01-16-23 @ 06:25) (96% - 100%)  Wt(kg): --  I&O's Summary      LABS:    01-16    138  |  102  |  6<L>  ----------------------------<  76  4.2   |  26  |  0.35<L>    Ca    9.4      16 Jan 2023 06:00  Phos  2.9     01-16  Mg     1.90     01-16          CAPILLARY BLOOD GLUCOSE                MEDICATIONS  (STANDING):  amLODIPine   Tablet 5 milliGRAM(s) Oral daily  ascorbic acid 500 milliGRAM(s) Oral daily  chlorhexidine 2% Cloths 1 Application(s) Topical daily  Dakins Solution - 1/4 Strength 1 Application(s) Topical two times a day  ferrous    sulfate 325 milliGRAM(s) Oral daily  levETIRAcetam  IVPB 500 milliGRAM(s) IV Intermittent every 12 hours  multivitamin 1 Tablet(s) Oral daily  polyethylene glycol 3350 17 Gram(s) Oral two times a day  QUEtiapine 25 milliGRAM(s) Oral three times a day  traZODone 150 milliGRAM(s) Oral daily    MEDICATIONS  (PRN):  acetaminophen     Tablet .. 650 milliGRAM(s) Oral every 6 hours PRN Temp greater or equal to 38C (100.4F), Mild Pain (1 - 3), Moderate Pain (4 - 6)  melatonin 3 milliGRAM(s) Oral at bedtime PRN Insomnia          PHYSICAL EXAM:  GENERAL: NAD, well-groomed, well-developed  HEAD:  Atraumatic, Normocephalic  CHEST/LUNG: Clear to percussion bilaterally; No rales, rhonchi, wheezing, or rubs  HEART: Regular rate and rhythm; No murmurs, rubs, or gallops  ABDOMEN: Soft, Nontender, Nondistended; Bowel sounds present  EXTREMITIES: edema +    Care Discussed with Consultants/Other Providers [ ] YES  [ ] NO

## 2023-01-17 RX ADMIN — Medication 325 MILLIGRAM(S): at 11:58

## 2023-01-17 RX ADMIN — Medication 150 MILLIGRAM(S): at 11:58

## 2023-01-17 RX ADMIN — Medication 650 MILLIGRAM(S): at 12:58

## 2023-01-17 RX ADMIN — Medication 650 MILLIGRAM(S): at 11:58

## 2023-01-17 RX ADMIN — QUETIAPINE FUMARATE 25 MILLIGRAM(S): 200 TABLET, FILM COATED ORAL at 13:02

## 2023-01-17 RX ADMIN — LEVETIRACETAM 400 MILLIGRAM(S): 250 TABLET, FILM COATED ORAL at 17:57

## 2023-01-17 RX ADMIN — Medication 1 APPLICATION(S): at 17:57

## 2023-01-17 RX ADMIN — AMLODIPINE BESYLATE 5 MILLIGRAM(S): 2.5 TABLET ORAL at 06:05

## 2023-01-17 RX ADMIN — Medication 1 APPLICATION(S): at 06:12

## 2023-01-17 RX ADMIN — CHLORHEXIDINE GLUCONATE 1 APPLICATION(S): 213 SOLUTION TOPICAL at 11:59

## 2023-01-17 RX ADMIN — Medication 1 TABLET(S): at 11:58

## 2023-01-17 RX ADMIN — Medication 500 MILLIGRAM(S): at 11:58

## 2023-01-17 RX ADMIN — LEVETIRACETAM 400 MILLIGRAM(S): 250 TABLET, FILM COATED ORAL at 06:06

## 2023-01-17 RX ADMIN — QUETIAPINE FUMARATE 25 MILLIGRAM(S): 200 TABLET, FILM COATED ORAL at 21:38

## 2023-01-17 RX ADMIN — QUETIAPINE FUMARATE 25 MILLIGRAM(S): 200 TABLET, FILM COATED ORAL at 06:05

## 2023-01-17 NOTE — PROGRESS NOTE ADULT - SUBJECTIVE AND OBJECTIVE BOX
Patient is a 52y old  Female who presents with a chief complaint of FTT (16 Jan 2023 13:07)    Date of servie : 01-17-23 @ 12:28  INTERVAL HPI/OVERNIGHT EVENTS:  T(C): 36.5 (01-17-23 @ 06:00), Max: 36.5 (01-17-23 @ 06:00)  HR: 90 (01-17-23 @ 06:00) (90 - 98)  BP: 130/84 (01-17-23 @ 06:00) (114/86 - 130/84)  RR: 18 (01-17-23 @ 06:00) (16 - 18)  SpO2: 96% (01-17-23 @ 06:00) (96% - 100%)  Wt(kg): --  I&O's Summary      LABS:    01-16    138  |  102  |  6<L>  ----------------------------<  76  4.2   |  26  |  0.35<L>    Ca    9.4      16 Jan 2023 06:00  Phos  2.9     01-16  Mg     1.90     01-16          CAPILLARY BLOOD GLUCOSE                MEDICATIONS  (STANDING):  amLODIPine   Tablet 5 milliGRAM(s) Oral daily  ascorbic acid 500 milliGRAM(s) Oral daily  chlorhexidine 2% Cloths 1 Application(s) Topical daily  Dakins Solution - 1/4 Strength 1 Application(s) Topical two times a day  ferrous    sulfate 325 milliGRAM(s) Oral daily  levETIRAcetam  IVPB 500 milliGRAM(s) IV Intermittent every 12 hours  multivitamin 1 Tablet(s) Oral daily  polyethylene glycol 3350 17 Gram(s) Oral two times a day  QUEtiapine 25 milliGRAM(s) Oral three times a day  traZODone 150 milliGRAM(s) Oral daily    MEDICATIONS  (PRN):  acetaminophen     Tablet .. 650 milliGRAM(s) Oral every 6 hours PRN Temp greater or equal to 38C (100.4F), Mild Pain (1 - 3), Moderate Pain (4 - 6)  melatonin 3 milliGRAM(s) Oral at bedtime PRN Insomnia          PHYSICAL EXAM:  GENERAL: frail  CHEST/LUNG: Clear to percussion bilaterally; No rales, rhonchi, wheezing, or rubs  HEART: Regular rate and rhythm; No murmurs, rubs, or gallops  ABDOMEN: Soft, Nontender, Nondistended; Bowel sounds present  EXTREMITIES: edema +    Care Discussed with Consultants/Other Providers [ ] YES  [ ] NO

## 2023-01-17 NOTE — PROGRESS NOTE ADULT - ASSESSMENT
53 yo female with a PMHx of traumatic subdural hemorrhage, dementia, HTN,  who was BIMBEMS from [facility] for failure to thrive. Facility states that for the past 3 days the pt has been spitting out her medications and food, and has significantly decreased her PO intake. Per the facility, the pt is A&Ox0 and nonverbal at baseline. She does occasionally follow commands. On examination here pt was not following commands    1 FTT  - poor po inatke  - calorie count   - will follow recs  - diet as per speech and swallow     2 Fever  - unclear etiology  - fu  blood cultures  - ID fu  -  finished ABX    3 Hypernatremia  - monitor BMP  - IVF   - renal fu     4 HTN  - cw home meds  - DASH diet       dc planning  palliative fu     dc planning

## 2023-01-18 RX ADMIN — QUETIAPINE FUMARATE 25 MILLIGRAM(S): 200 TABLET, FILM COATED ORAL at 13:37

## 2023-01-18 RX ADMIN — LEVETIRACETAM 400 MILLIGRAM(S): 250 TABLET, FILM COATED ORAL at 17:45

## 2023-01-18 RX ADMIN — Medication 500 MILLIGRAM(S): at 13:04

## 2023-01-18 RX ADMIN — Medication 325 MILLIGRAM(S): at 13:04

## 2023-01-18 RX ADMIN — Medication 150 MILLIGRAM(S): at 13:05

## 2023-01-18 RX ADMIN — Medication 1 TABLET(S): at 13:10

## 2023-01-18 RX ADMIN — POLYETHYLENE GLYCOL 3350 17 GRAM(S): 17 POWDER, FOR SOLUTION ORAL at 17:54

## 2023-01-18 RX ADMIN — Medication 1 APPLICATION(S): at 17:46

## 2023-01-18 RX ADMIN — QUETIAPINE FUMARATE 25 MILLIGRAM(S): 200 TABLET, FILM COATED ORAL at 06:39

## 2023-01-18 RX ADMIN — AMLODIPINE BESYLATE 5 MILLIGRAM(S): 2.5 TABLET ORAL at 06:39

## 2023-01-18 RX ADMIN — CHLORHEXIDINE GLUCONATE 1 APPLICATION(S): 213 SOLUTION TOPICAL at 12:11

## 2023-01-18 RX ADMIN — QUETIAPINE FUMARATE 25 MILLIGRAM(S): 200 TABLET, FILM COATED ORAL at 21:23

## 2023-01-18 RX ADMIN — LEVETIRACETAM 400 MILLIGRAM(S): 250 TABLET, FILM COATED ORAL at 06:38

## 2023-01-18 RX ADMIN — Medication 1 APPLICATION(S): at 06:38

## 2023-01-19 LAB
ALBUMIN SERPL ELPH-MCNC: 3.3 G/DL — SIGNIFICANT CHANGE UP (ref 3.3–5)
ALP SERPL-CCNC: 110 U/L — SIGNIFICANT CHANGE UP (ref 40–120)
ALT FLD-CCNC: 5 U/L — SIGNIFICANT CHANGE UP (ref 4–33)
ANION GAP SERPL CALC-SCNC: 10 MMOL/L — SIGNIFICANT CHANGE UP (ref 7–14)
AST SERPL-CCNC: 11 U/L — SIGNIFICANT CHANGE UP (ref 4–32)
BILIRUB SERPL-MCNC: 0.2 MG/DL — SIGNIFICANT CHANGE UP (ref 0.2–1.2)
BUN SERPL-MCNC: 8 MG/DL — SIGNIFICANT CHANGE UP (ref 7–23)
CALCIUM SERPL-MCNC: 9.6 MG/DL — SIGNIFICANT CHANGE UP (ref 8.4–10.5)
CHLORIDE SERPL-SCNC: 102 MMOL/L — SIGNIFICANT CHANGE UP (ref 98–107)
CO2 SERPL-SCNC: 27 MMOL/L — SIGNIFICANT CHANGE UP (ref 22–31)
CREAT SERPL-MCNC: 0.35 MG/DL — LOW (ref 0.5–1.3)
EGFR: 122 ML/MIN/1.73M2 — SIGNIFICANT CHANGE UP
GLUCOSE SERPL-MCNC: 74 MG/DL — SIGNIFICANT CHANGE UP (ref 70–99)
HCT VFR BLD CALC: 36.6 % — SIGNIFICANT CHANGE UP (ref 34.5–45)
HGB BLD-MCNC: 12 G/DL — SIGNIFICANT CHANGE UP (ref 11.5–15.5)
MCHC RBC-ENTMCNC: 29 PG — SIGNIFICANT CHANGE UP (ref 27–34)
MCHC RBC-ENTMCNC: 32.8 GM/DL — SIGNIFICANT CHANGE UP (ref 32–36)
MCV RBC AUTO: 88.4 FL — SIGNIFICANT CHANGE UP (ref 80–100)
NRBC # BLD: 0 /100 WBCS — SIGNIFICANT CHANGE UP (ref 0–0)
NRBC # FLD: 0 K/UL — SIGNIFICANT CHANGE UP (ref 0–0)
PLATELET # BLD AUTO: 544 K/UL — HIGH (ref 150–400)
POTASSIUM SERPL-MCNC: 4.3 MMOL/L — SIGNIFICANT CHANGE UP (ref 3.5–5.3)
POTASSIUM SERPL-SCNC: 4.3 MMOL/L — SIGNIFICANT CHANGE UP (ref 3.5–5.3)
PROT SERPL-MCNC: 6.9 G/DL — SIGNIFICANT CHANGE UP (ref 6–8.3)
RBC # BLD: 4.14 M/UL — SIGNIFICANT CHANGE UP (ref 3.8–5.2)
RBC # FLD: 14.6 % — HIGH (ref 10.3–14.5)
SODIUM SERPL-SCNC: 139 MMOL/L — SIGNIFICANT CHANGE UP (ref 135–145)
WBC # BLD: 5.55 K/UL — SIGNIFICANT CHANGE UP (ref 3.8–10.5)
WBC # FLD AUTO: 5.55 K/UL — SIGNIFICANT CHANGE UP (ref 3.8–10.5)

## 2023-01-19 RX ADMIN — Medication 1 TABLET(S): at 11:56

## 2023-01-19 RX ADMIN — QUETIAPINE FUMARATE 25 MILLIGRAM(S): 200 TABLET, FILM COATED ORAL at 06:58

## 2023-01-19 RX ADMIN — Medication 325 MILLIGRAM(S): at 11:51

## 2023-01-19 RX ADMIN — LEVETIRACETAM 400 MILLIGRAM(S): 250 TABLET, FILM COATED ORAL at 06:59

## 2023-01-19 RX ADMIN — Medication 500 MILLIGRAM(S): at 11:51

## 2023-01-19 RX ADMIN — POLYETHYLENE GLYCOL 3350 17 GRAM(S): 17 POWDER, FOR SOLUTION ORAL at 17:08

## 2023-01-19 RX ADMIN — Medication 3 MILLIGRAM(S): at 21:54

## 2023-01-19 RX ADMIN — QUETIAPINE FUMARATE 25 MILLIGRAM(S): 200 TABLET, FILM COATED ORAL at 21:54

## 2023-01-19 RX ADMIN — CHLORHEXIDINE GLUCONATE 1 APPLICATION(S): 213 SOLUTION TOPICAL at 12:00

## 2023-01-19 RX ADMIN — Medication 1 APPLICATION(S): at 06:58

## 2023-01-19 RX ADMIN — Medication 150 MILLIGRAM(S): at 11:51

## 2023-01-19 RX ADMIN — QUETIAPINE FUMARATE 25 MILLIGRAM(S): 200 TABLET, FILM COATED ORAL at 14:00

## 2023-01-19 RX ADMIN — Medication 1 APPLICATION(S): at 17:07

## 2023-01-19 RX ADMIN — AMLODIPINE BESYLATE 5 MILLIGRAM(S): 2.5 TABLET ORAL at 07:01

## 2023-01-19 RX ADMIN — LEVETIRACETAM 400 MILLIGRAM(S): 250 TABLET, FILM COATED ORAL at 17:13

## 2023-01-19 NOTE — PROGRESS NOTE ADULT - ASSESSMENT
51 yo female with a PMHx of traumatic subdural hemorrhage, dementia, HTN,  who was BIMBEMS from [facility] for failure to thrive. Facility states that for the past 3 days the pt has been spitting out her medications and food, and has significantly decreased her PO intake. Per the facility, the pt is A&Ox0 and nonverbal at baseline. She does occasionally follow commands. On examination here pt was not following commands    1 FTT  - poor po inatke  - calorie count   - will follow recs  - diet as per speech and swallow     2 Fever  - unclear etiology  - fu  blood cultures  - ID fu  -  finished ABX    3 Hypernatremia  - monitor BMP  - IVF   - renal fu     4 HTN  - cw home meds  - DASH diet       dc planning  palliative fu     dc planning

## 2023-01-19 NOTE — PROGRESS NOTE ADULT - SUBJECTIVE AND OBJECTIVE BOX
Patient is a 52y old  Female who presents with a chief complaint of FTT (17 Jan 2023 12:27)    Date of servie : 01-19-23 @ 15:19  INTERVAL HPI/OVERNIGHT EVENTS:  T(C): 36.2 (01-19-23 @ 11:55), Max: 36.7 (01-18-23 @ 20:13)  HR: 65 (01-19-23 @ 11:55) (65 - 114)  BP: 132/98 (01-19-23 @ 11:55) (125/89 - 142/89)  RR: 18 (01-19-23 @ 11:55) (17 - 18)  SpO2: 100% (01-19-23 @ 11:55) (100% - 100%)  Wt(kg): --  I&O's Summary      LABS:                        12.0   5.55  )-----------( 544      ( 19 Jan 2023 06:30 )             36.6     01-19    139  |  102  |  8   ----------------------------<  74  4.3   |  27  |  0.35<L>    Ca    9.6      19 Jan 2023 06:30    TPro  6.9  /  Alb  3.3  /  TBili  0.2  /  DBili  x   /  AST  11  /  ALT  5   /  AlkPhos  110  01-19        CAPILLARY BLOOD GLUCOSE                MEDICATIONS  (STANDING):  amLODIPine   Tablet 5 milliGRAM(s) Oral daily  ascorbic acid 500 milliGRAM(s) Oral daily  chlorhexidine 2% Cloths 1 Application(s) Topical daily  Dakins Solution - 1/4 Strength 1 Application(s) Topical two times a day  ferrous    sulfate 325 milliGRAM(s) Oral daily  levETIRAcetam  IVPB 500 milliGRAM(s) IV Intermittent every 12 hours  multivitamin 1 Tablet(s) Oral daily  polyethylene glycol 3350 17 Gram(s) Oral two times a day  QUEtiapine 25 milliGRAM(s) Oral three times a day  traZODone 150 milliGRAM(s) Oral daily    MEDICATIONS  (PRN):  acetaminophen     Tablet .. 650 milliGRAM(s) Oral every 6 hours PRN Temp greater or equal to 38C (100.4F), Mild Pain (1 - 3), Moderate Pain (4 - 6)  melatonin 3 milliGRAM(s) Oral at bedtime PRN Insomnia          PHYSICAL EXAM:  GENERAL: NAD, well-groomed, well-developed  HEAD:  Atraumatic, Normocephalic  CHEST/LUNG: Clear to percussion bilaterally; No rales, rhonchi, wheezing, or rubs  HEART: Regular rate and rhythm; No murmurs, rubs, or gallops  ABDOMEN: Soft, Nontender, Nondistended; Bowel sounds present  EXTREMITIES:  2+ Peripheral Pulses, No clubbing, cyanosis, or edema  LYMPH: No lymphadenopathy noted  SKIN: No rashes or lesions    Care Discussed with Consultants/Other Providers [ ] YES  [ ] NO

## 2023-01-19 NOTE — CHART NOTE - NSCHARTNOTEFT_GEN_A_CORE
Source: Patient [ ]    Family [ ]     other [x ] chart review    Patient seen for severe malnutrition f/u. 52 year old female with a PMH of traumatic subdural hemorrhage, dementia, HTN who was BIBEMS from [facility] for failure to thrive per chart.    Patient seen at bed side, unable to obtain information from patient 2/2 cognitive status. Noted w/ minimal PO intake of lunch tray per observation. Noted w/ variable PO intake 0-100% per RN flow sheet. Receives Hormel Vital Shake (520 kcal, 22 g pro) BID and magic cup 1x daily (290 kcal, 9 g pro) to help meet nutritional needs. No GI distress noted. Noted w/ sacrum stage 4 pressure injury and no edema per RN flow sheet.    Diet : Diet, Pureed:   Mildly Thick Liquids (MILDTHICKLIQS) (01-13-23 @ 09:45)    Current Weight: Weight (kg): no new weight to assess   38 kg (1/14)    Pertinent Medications: MEDICATIONS  (STANDING):  amLODIPine   Tablet 5 milliGRAM(s) Oral daily  ascorbic acid 500 milliGRAM(s) Oral daily  chlorhexidine 2% Cloths 1 Application(s) Topical daily  Dakins Solution - 1/4 Strength 1 Application(s) Topical two times a day  ferrous    sulfate 325 milliGRAM(s) Oral daily  levETIRAcetam  IVPB 500 milliGRAM(s) IV Intermittent every 12 hours  multivitamin 1 Tablet(s) Oral daily  polyethylene glycol 3350 17 Gram(s) Oral two times a day  QUEtiapine 25 milliGRAM(s) Oral three times a day  traZODone 150 milliGRAM(s) Oral daily    MEDICATIONS  (PRN):  acetaminophen     Tablet .. 650 milliGRAM(s) Oral every 6 hours PRN Temp greater or equal to 38C (100.4F), Mild Pain (1 - 3), Moderate Pain (4 - 6)  melatonin 3 milliGRAM(s) Oral at bedtime PRN Insomnia    Pertinent Labs:  01-19 Na139 mmol/L Glu 74 mg/dL K+ 4.3 mmol/L Cr  0.35 mg/dL<L> BUN 8 mg/dL 01-16 Phos 2.9 mg/dL 01-19 Alb 3.3 g/dL    Estimated Needs:   [x ] no change since previous assessment    Previous Nutrition Diagnosis: Severe malnutrition    Nutrition Diagnosis is [x ] ongoing  [ ] resolved [ ] not applicable     Recommend  - continue diet as ordered  - will continue to provide Hormel Vital Shake (520 kcal, 22 g pro) and magic cup (290 kcal, 9 g pro)  - continue w/ multivitamin and vitamin C  - obtain weekly weight and continue to document PO intake to monitor trend    Monitoring and Evaluation:   [x ] PO intake [x ] Tolerance to diet prescription [x ] weights [x ] follow up per protocol

## 2023-01-20 RX ADMIN — Medication 650 MILLIGRAM(S): at 06:37

## 2023-01-20 RX ADMIN — LEVETIRACETAM 400 MILLIGRAM(S): 250 TABLET, FILM COATED ORAL at 06:11

## 2023-01-20 RX ADMIN — Medication 1 APPLICATION(S): at 17:28

## 2023-01-20 RX ADMIN — Medication 325 MILLIGRAM(S): at 12:01

## 2023-01-20 RX ADMIN — POLYETHYLENE GLYCOL 3350 17 GRAM(S): 17 POWDER, FOR SOLUTION ORAL at 17:28

## 2023-01-20 RX ADMIN — Medication 650 MILLIGRAM(S): at 14:01

## 2023-01-20 RX ADMIN — QUETIAPINE FUMARATE 25 MILLIGRAM(S): 200 TABLET, FILM COATED ORAL at 22:59

## 2023-01-20 RX ADMIN — Medication 650 MILLIGRAM(S): at 13:01

## 2023-01-20 RX ADMIN — CHLORHEXIDINE GLUCONATE 1 APPLICATION(S): 213 SOLUTION TOPICAL at 12:02

## 2023-01-20 RX ADMIN — POLYETHYLENE GLYCOL 3350 17 GRAM(S): 17 POWDER, FOR SOLUTION ORAL at 05:38

## 2023-01-20 RX ADMIN — Medication 650 MILLIGRAM(S): at 05:37

## 2023-01-20 RX ADMIN — QUETIAPINE FUMARATE 25 MILLIGRAM(S): 200 TABLET, FILM COATED ORAL at 12:01

## 2023-01-20 RX ADMIN — Medication 150 MILLIGRAM(S): at 12:01

## 2023-01-20 RX ADMIN — QUETIAPINE FUMARATE 25 MILLIGRAM(S): 200 TABLET, FILM COATED ORAL at 05:38

## 2023-01-20 RX ADMIN — Medication 1 TABLET(S): at 12:01

## 2023-01-20 RX ADMIN — AMLODIPINE BESYLATE 5 MILLIGRAM(S): 2.5 TABLET ORAL at 05:38

## 2023-01-20 RX ADMIN — Medication 3 MILLIGRAM(S): at 22:59

## 2023-01-20 RX ADMIN — Medication 1 APPLICATION(S): at 05:38

## 2023-01-20 RX ADMIN — Medication 500 MILLIGRAM(S): at 12:01

## 2023-01-20 RX ADMIN — LEVETIRACETAM 400 MILLIGRAM(S): 250 TABLET, FILM COATED ORAL at 17:28

## 2023-01-21 RX ADMIN — QUETIAPINE FUMARATE 25 MILLIGRAM(S): 200 TABLET, FILM COATED ORAL at 22:58

## 2023-01-21 RX ADMIN — Medication 1 TABLET(S): at 11:41

## 2023-01-21 RX ADMIN — LEVETIRACETAM 400 MILLIGRAM(S): 250 TABLET, FILM COATED ORAL at 18:37

## 2023-01-21 RX ADMIN — LEVETIRACETAM 400 MILLIGRAM(S): 250 TABLET, FILM COATED ORAL at 05:05

## 2023-01-21 RX ADMIN — Medication 3 MILLIGRAM(S): at 22:58

## 2023-01-21 RX ADMIN — Medication 325 MILLIGRAM(S): at 11:41

## 2023-01-21 RX ADMIN — QUETIAPINE FUMARATE 25 MILLIGRAM(S): 200 TABLET, FILM COATED ORAL at 05:05

## 2023-01-21 RX ADMIN — POLYETHYLENE GLYCOL 3350 17 GRAM(S): 17 POWDER, FOR SOLUTION ORAL at 05:06

## 2023-01-21 RX ADMIN — QUETIAPINE FUMARATE 25 MILLIGRAM(S): 200 TABLET, FILM COATED ORAL at 14:06

## 2023-01-21 RX ADMIN — Medication 150 MILLIGRAM(S): at 11:40

## 2023-01-21 RX ADMIN — Medication 500 MILLIGRAM(S): at 11:41

## 2023-01-21 RX ADMIN — AMLODIPINE BESYLATE 5 MILLIGRAM(S): 2.5 TABLET ORAL at 05:05

## 2023-01-21 RX ADMIN — Medication 1 APPLICATION(S): at 18:38

## 2023-01-21 RX ADMIN — Medication 1 APPLICATION(S): at 05:06

## 2023-01-21 RX ADMIN — CHLORHEXIDINE GLUCONATE 1 APPLICATION(S): 213 SOLUTION TOPICAL at 11:42

## 2023-01-21 NOTE — PROGRESS NOTE ADULT - SUBJECTIVE AND OBJECTIVE BOX
Patient is a 52y old  Female who presents with a chief complaint of FTT (19 Jan 2023 15:18)    Date of servie : 01-21-23 @ 12:02  INTERVAL HPI/OVERNIGHT EVENTS:  T(C): 36.7 (01-21-23 @ 05:01), Max: 36.9 (01-20-23 @ 21:23)  HR: 98 (01-21-23 @ 05:01) (98 - 107)  BP: 121/98 (01-21-23 @ 05:01) (109/91 - 127/98)  RR: 18 (01-21-23 @ 05:01) (16 - 18)  SpO2: 100% (01-21-23 @ 05:01) (95% - 100%)  Wt(kg): --  I&O's Summary      LABS:              CAPILLARY BLOOD GLUCOSE                MEDICATIONS  (STANDING):  amLODIPine   Tablet 5 milliGRAM(s) Oral daily  ascorbic acid 500 milliGRAM(s) Oral daily  chlorhexidine 2% Cloths 1 Application(s) Topical daily  Dakins Solution - 1/4 Strength 1 Application(s) Topical two times a day  ferrous    sulfate 325 milliGRAM(s) Oral daily  levETIRAcetam  IVPB 500 milliGRAM(s) IV Intermittent every 12 hours  multivitamin 1 Tablet(s) Oral daily  polyethylene glycol 3350 17 Gram(s) Oral two times a day  QUEtiapine 25 milliGRAM(s) Oral three times a day  traZODone 150 milliGRAM(s) Oral daily    MEDICATIONS  (PRN):  acetaminophen     Tablet .. 650 milliGRAM(s) Oral every 6 hours PRN Temp greater or equal to 38C (100.4F), Mild Pain (1 - 3), Moderate Pain (4 - 6)  melatonin 3 milliGRAM(s) Oral at bedtime PRN Insomnia          PHYSICAL EXAM:  GENERAL: frail  CHEST/LUNG: Clear to percussion bilaterally; No rales, rhonchi, wheezing, or rubs  HEART: Regular rate and rhythm; No murmurs, rubs, or gallops  ABDOMEN: Soft, Nontender, Nondistended; Bowel sounds present  EXTREMITIES:  edema +    Care Discussed with Consultants/Other Providers [ x] YES  [ ] NO

## 2023-01-21 NOTE — PROGRESS NOTE ADULT - ASSESSMENT
51 yo female with a PMHx of traumatic subdural hemorrhage, dementia, HTN,  who was BIMBEMS from [facility] for failure to thrive. Facility states that for the past 3 days the pt has been spitting out her medications and food, and has significantly decreased her PO intake. Per the facility, the pt is A&Ox0 and nonverbal at baseline. She does occasionally follow commands. On examination here pt was not following commands    1 FTT  - poor po inatke  - calorie count   - will follow recs  - diet as per speech and swallow     2 Fever  - unclear etiology  - fu  blood cultures  - ID fu  -  finished ABX    3 Hypernatremia  - monitor BMP  - IVF   - renal fu     4 HTN  - cw home meds  - DASH diet       dc planning  palliative fu     dc planning

## 2023-01-22 LAB — SARS-COV-2 RNA SPEC QL NAA+PROBE: SIGNIFICANT CHANGE UP

## 2023-01-22 RX ADMIN — Medication 150 MILLIGRAM(S): at 12:08

## 2023-01-22 RX ADMIN — QUETIAPINE FUMARATE 25 MILLIGRAM(S): 200 TABLET, FILM COATED ORAL at 21:47

## 2023-01-22 RX ADMIN — LEVETIRACETAM 400 MILLIGRAM(S): 250 TABLET, FILM COATED ORAL at 06:12

## 2023-01-22 RX ADMIN — AMLODIPINE BESYLATE 5 MILLIGRAM(S): 2.5 TABLET ORAL at 06:21

## 2023-01-22 RX ADMIN — QUETIAPINE FUMARATE 25 MILLIGRAM(S): 200 TABLET, FILM COATED ORAL at 06:13

## 2023-01-22 RX ADMIN — Medication 325 MILLIGRAM(S): at 12:08

## 2023-01-22 RX ADMIN — Medication 3 MILLIGRAM(S): at 21:47

## 2023-01-22 RX ADMIN — Medication 500 MILLIGRAM(S): at 12:08

## 2023-01-22 RX ADMIN — QUETIAPINE FUMARATE 25 MILLIGRAM(S): 200 TABLET, FILM COATED ORAL at 13:49

## 2023-01-22 RX ADMIN — LEVETIRACETAM 400 MILLIGRAM(S): 250 TABLET, FILM COATED ORAL at 19:01

## 2023-01-22 RX ADMIN — Medication 1 TABLET(S): at 12:08

## 2023-01-22 RX ADMIN — Medication 1 APPLICATION(S): at 06:14

## 2023-01-22 RX ADMIN — CHLORHEXIDINE GLUCONATE 1 APPLICATION(S): 213 SOLUTION TOPICAL at 12:10

## 2023-01-22 RX ADMIN — Medication 1 APPLICATION(S): at 18:50

## 2023-01-22 NOTE — PROGRESS NOTE ADULT - SUBJECTIVE AND OBJECTIVE BOX
Patient is a 52y old  Female who presents with a chief complaint of FTT (21 Jan 2023 12:02)    Date of servie : 01-22-23 @ 12:13  INTERVAL HPI/OVERNIGHT EVENTS:  T(C): 36.3 (01-22-23 @ 06:12), Max: 36.3 (01-21-23 @ 21:11)  HR: 102 (01-22-23 @ 09:34) (99 - 109)  BP: 148/111 (01-22-23 @ 09:34) (118/98 - 157/106)  RR: 17 (01-22-23 @ 09:34) (16 - 18)  SpO2: 99% (01-22-23 @ 09:34) (99% - 100%)  Wt(kg): --  I&O's Summary      LABS:              CAPILLARY BLOOD GLUCOSE                MEDICATIONS  (STANDING):  amLODIPine   Tablet 5 milliGRAM(s) Oral daily  ascorbic acid 500 milliGRAM(s) Oral daily  chlorhexidine 2% Cloths 1 Application(s) Topical daily  Dakins Solution - 1/4 Strength 1 Application(s) Topical two times a day  ferrous    sulfate 325 milliGRAM(s) Oral daily  levETIRAcetam  IVPB 500 milliGRAM(s) IV Intermittent every 12 hours  multivitamin 1 Tablet(s) Oral daily  polyethylene glycol 3350 17 Gram(s) Oral two times a day  QUEtiapine 25 milliGRAM(s) Oral three times a day  traZODone 150 milliGRAM(s) Oral daily    MEDICATIONS  (PRN):  acetaminophen     Tablet .. 650 milliGRAM(s) Oral every 6 hours PRN Temp greater or equal to 38C (100.4F), Mild Pain (1 - 3), Moderate Pain (4 - 6)  melatonin 3 milliGRAM(s) Oral at bedtime PRN Insomnia          PHYSICAL EXAM:  GENERAL: FRAIL  CHEST/LUNG: Clear to percussion bilaterally; No rales, rhonchi, wheezing, or rubs  HEART: Regular rate and rhythm; No murmurs, rubs, or gallops  ABDOMEN: Soft, Nontender, Nondistended; Bowel sounds present  EXTREMITIES:  EDEMA +    Care Discussed with Consultants/Other Providers [ ] YES  [ ] NO

## 2023-01-22 NOTE — PROGRESS NOTE ADULT - ASSESSMENT
53 yo female with a PMHx of traumatic subdural hemorrhage, dementia, HTN,  who was BIMBEMS from [facility] for failure to thrive. Facility states that for the past 3 days the pt has been spitting out her medications and food, and has significantly decreased her PO intake. Per the facility, the pt is A&Ox0 and nonverbal at baseline. She does occasionally follow commands. On examination here pt was not following commands    1 FTT  - poor po inatke  - calorie count   - will follow recs  - diet as per speech and swallow     2 Fever  - unclear etiology  - fu  blood cultures  - ID fu  -  finished ABX    3 Hypernatremia  - monitor BMP  - IVF   - renal fu     4 HTN  - cw home meds  - DASH diet       dc planning  palliative fu

## 2023-01-23 RX ADMIN — Medication 325 MILLIGRAM(S): at 12:16

## 2023-01-23 RX ADMIN — Medication 1 APPLICATION(S): at 06:13

## 2023-01-23 RX ADMIN — Medication 150 MILLIGRAM(S): at 12:16

## 2023-01-23 RX ADMIN — CHLORHEXIDINE GLUCONATE 1 APPLICATION(S): 213 SOLUTION TOPICAL at 12:22

## 2023-01-23 RX ADMIN — Medication 500 MILLIGRAM(S): at 12:15

## 2023-01-23 RX ADMIN — POLYETHYLENE GLYCOL 3350 17 GRAM(S): 17 POWDER, FOR SOLUTION ORAL at 06:14

## 2023-01-23 RX ADMIN — QUETIAPINE FUMARATE 25 MILLIGRAM(S): 200 TABLET, FILM COATED ORAL at 22:27

## 2023-01-23 RX ADMIN — Medication 1 TABLET(S): at 12:15

## 2023-01-23 RX ADMIN — QUETIAPINE FUMARATE 25 MILLIGRAM(S): 200 TABLET, FILM COATED ORAL at 06:13

## 2023-01-23 RX ADMIN — Medication 1 APPLICATION(S): at 18:51

## 2023-01-23 RX ADMIN — LEVETIRACETAM 400 MILLIGRAM(S): 250 TABLET, FILM COATED ORAL at 18:49

## 2023-01-23 RX ADMIN — QUETIAPINE FUMARATE 25 MILLIGRAM(S): 200 TABLET, FILM COATED ORAL at 13:13

## 2023-01-23 RX ADMIN — LEVETIRACETAM 400 MILLIGRAM(S): 250 TABLET, FILM COATED ORAL at 06:13

## 2023-01-23 RX ADMIN — Medication 3 MILLIGRAM(S): at 22:28

## 2023-01-23 NOTE — PROGRESS NOTE ADULT - SUBJECTIVE AND OBJECTIVE BOX
Patient is a 52y old  Female who presents with a chief complaint of FTT (22 Jan 2023 12:13)    Date of servie : 01-23-23 @ 13:22  INTERVAL HPI/OVERNIGHT EVENTS:  T(C): 36.4 (01-23-23 @ 12:45), Max: 36.4 (01-23-23 @ 12:45)  HR: 95 (01-23-23 @ 12:45) (95 - 115)  BP: 125/92 (01-23-23 @ 12:45) (100/68 - 125/92)  RR: 18 (01-23-23 @ 12:45) (18 - 18)  SpO2: 100% (01-23-23 @ 12:45) (100% - 100%)  Wt(kg): --  I&O's Summary      LABS:              CAPILLARY BLOOD GLUCOSE                MEDICATIONS  (STANDING):  amLODIPine   Tablet 5 milliGRAM(s) Oral daily  ascorbic acid 500 milliGRAM(s) Oral daily  chlorhexidine 2% Cloths 1 Application(s) Topical daily  Dakins Solution - 1/4 Strength 1 Application(s) Topical two times a day  ferrous    sulfate 325 milliGRAM(s) Oral daily  levETIRAcetam  IVPB 500 milliGRAM(s) IV Intermittent every 12 hours  multivitamin 1 Tablet(s) Oral daily  polyethylene glycol 3350 17 Gram(s) Oral two times a day  QUEtiapine 25 milliGRAM(s) Oral three times a day  traZODone 150 milliGRAM(s) Oral daily    MEDICATIONS  (PRN):  acetaminophen     Tablet .. 650 milliGRAM(s) Oral every 6 hours PRN Temp greater or equal to 38C (100.4F), Mild Pain (1 - 3), Moderate Pain (4 - 6)  melatonin 3 milliGRAM(s) Oral at bedtime PRN Insomnia          PHYSICAL EXAM:  GENERAL: frail  CHEST/LUNG: Clear to percussion bilaterally; No rales, rhonchi, wheezing, or rubs  HEART: Regular rate and rhythm; No murmurs, rubs, or gallops  ABDOMEN: Soft, Nontender, Nondistended; Bowel sounds present  EXTREMITIES:  2+ Peripheral Pulses, No clubbing, cyanosis, or edema  LYMPH: No lymphadenopathy noted  SKIN: No rashes or lesions    Care Discussed with Consultants/Other Providers [ ] YES  [ ] NO

## 2023-01-23 NOTE — PROGRESS NOTE ADULT - ASSESSMENT
53 yo female with a PMHx of traumatic subdural hemorrhage, dementia, HTN,  who was BIMBEMS from [facility] for failure to thrive. Facility states that for the past 3 days the pt has been spitting out her medications and food, and has significantly decreased her PO intake. Per the facility, the pt is A&Ox0 and nonverbal at baseline. She does occasionally follow commands. On examination here pt was not following commands    1 FTT  - poor po inatke  - calorie count   - will follow recs  - diet as per speech and swallow     2 Fever  - unclear etiology  - fu  blood cultures  - ID fu  -  finished ABX    3 Hypernatremia  - monitor BMP  - IVF   - renal fu     4 HTN  - cw home meds  - DASH diet       dc planning  palliative fu

## 2023-01-24 PROCEDURE — 99232 SBSQ HOSP IP/OBS MODERATE 35: CPT

## 2023-01-24 RX ORDER — LEVETIRACETAM 250 MG/1
500 TABLET, FILM COATED ORAL
Refills: 0 | Status: DISCONTINUED | OUTPATIENT
Start: 2023-01-24 | End: 2023-02-02

## 2023-01-24 RX ADMIN — Medication 150 MILLIGRAM(S): at 11:07

## 2023-01-24 RX ADMIN — AMLODIPINE BESYLATE 5 MILLIGRAM(S): 2.5 TABLET ORAL at 06:27

## 2023-01-24 RX ADMIN — Medication 1 APPLICATION(S): at 18:54

## 2023-01-24 RX ADMIN — QUETIAPINE FUMARATE 25 MILLIGRAM(S): 200 TABLET, FILM COATED ORAL at 06:28

## 2023-01-24 RX ADMIN — QUETIAPINE FUMARATE 25 MILLIGRAM(S): 200 TABLET, FILM COATED ORAL at 21:56

## 2023-01-24 RX ADMIN — Medication 500 MILLIGRAM(S): at 11:07

## 2023-01-24 RX ADMIN — POLYETHYLENE GLYCOL 3350 17 GRAM(S): 17 POWDER, FOR SOLUTION ORAL at 18:22

## 2023-01-24 RX ADMIN — Medication 1 APPLICATION(S): at 06:27

## 2023-01-24 RX ADMIN — LEVETIRACETAM 400 MILLIGRAM(S): 250 TABLET, FILM COATED ORAL at 06:26

## 2023-01-24 RX ADMIN — Medication 1 TABLET(S): at 11:07

## 2023-01-24 RX ADMIN — Medication 3 MILLIGRAM(S): at 21:56

## 2023-01-24 RX ADMIN — LEVETIRACETAM 500 MILLIGRAM(S): 250 TABLET, FILM COATED ORAL at 18:55

## 2023-01-24 RX ADMIN — CHLORHEXIDINE GLUCONATE 1 APPLICATION(S): 213 SOLUTION TOPICAL at 11:08

## 2023-01-24 RX ADMIN — Medication 325 MILLIGRAM(S): at 11:07

## 2023-01-24 RX ADMIN — QUETIAPINE FUMARATE 25 MILLIGRAM(S): 200 TABLET, FILM COATED ORAL at 14:23

## 2023-01-24 RX ADMIN — POLYETHYLENE GLYCOL 3350 17 GRAM(S): 17 POWDER, FOR SOLUTION ORAL at 06:28

## 2023-01-24 NOTE — PROGRESS NOTE ADULT - SUBJECTIVE AND OBJECTIVE BOX
Patient is a 52y old  Female who presents with a chief complaint of FTT (23 Jan 2023 13:21)    Date of servie : 01-24-23 @ 13:41  INTERVAL HPI/OVERNIGHT EVENTS:  T(C): 36.9 (01-24-23 @ 12:47), Max: 36.9 (01-24-23 @ 12:47)  HR: 109 (01-24-23 @ 12:47) (106 - 109)  BP: 141/109 (01-24-23 @ 12:47) (139/100 - 141/109)  RR: 17 (01-24-23 @ 12:47) (17 - 18)  SpO2: 100% (01-24-23 @ 12:47) (100% - 100%)  Wt(kg): --  I&O's Summary      LABS:              CAPILLARY BLOOD GLUCOSE                MEDICATIONS  (STANDING):  amLODIPine   Tablet 5 milliGRAM(s) Oral daily  ascorbic acid 500 milliGRAM(s) Oral daily  chlorhexidine 2% Cloths 1 Application(s) Topical daily  Dakins Solution - 1/4 Strength 1 Application(s) Topical two times a day  ferrous    sulfate 325 milliGRAM(s) Oral daily  levETIRAcetam  IVPB 500 milliGRAM(s) IV Intermittent every 12 hours  multivitamin 1 Tablet(s) Oral daily  polyethylene glycol 3350 17 Gram(s) Oral two times a day  QUEtiapine 25 milliGRAM(s) Oral three times a day  traZODone 150 milliGRAM(s) Oral daily    MEDICATIONS  (PRN):  acetaminophen     Tablet .. 650 milliGRAM(s) Oral every 6 hours PRN Temp greater or equal to 38C (100.4F), Mild Pain (1 - 3), Moderate Pain (4 - 6)  melatonin 3 milliGRAM(s) Oral at bedtime PRN Insomnia          PHYSICAL EXAM:  GENERAL: frail  CHEST/LUNG: Clear to percussion bilaterally; No rales, rhonchi, wheezing, or rubs  HEART: Regular rate and rhythm; No murmurs, rubs, or gallops  ABDOMEN: Soft, Nontender, Nondistended; Bowel sounds present  EXTREMITIES:  2+ Peripheral Pulses, No clubbing, cyanosis, or edema  LYMPH: No lymphadenopathy noted  SKIN: No rashes or lesions    Care Discussed with Consultants/Other Providers [ ] YES  [ ] NO

## 2023-01-24 NOTE — PROGRESS NOTE ADULT - ASSESSMENT
53 yo female with a PMHx of traumatic subdural hemorrhage, dementia, HTN,  who was BIMBEMS from [facility] for failure to thrive. Facility states that for the past 3 days the pt has been spitting out her medications and food, and has significantly decreased her PO intake. Per the facility, the pt is A&Ox0 and nonverbal at baseline. She does occasionally follow commands. On examination here pt was not following commands    1 FTT  - poor po inatke  - calorie count   - will follow recs  - diet as per speech and swallow     2 Fever  - unclear etiology  - fu  blood cultures  - ID fu  -  finished ABX    3 Hypernatremia  - monitor BMP  - IVF   - renal fu     4 HTN  - cw home meds  - DASH diet       dc planning  palliative fu     medically stable for dc , pending rehab

## 2023-01-24 NOTE — PROGRESS NOTE ADULT - ASSESSMENT
Assessment/Plan: 51 yo female with a PMHx of traumatic subdural hemorrhage, dementia, HTN,  who was BIMBEMS from [facility] for failure to thrive. Facility states that for the past 3 days the pt has been spitting out her medications and food, and has significantly decreased her PO intake. Per the facility, the pt is A&Ox0 and nonverbal at baseline. She does occasionally follow commands. On examination here pt was not following commands    Wound f/u requested to assist w/ management of chronic stage 4 pressure injury.  - less bone exposed but remains (sharp in center and areas of undermining), viable red granulation and muscle.   -Known chronic OM, completed IV antibiotics as recommended by ID  -Tissue type stable   -Undermining remains less deep   - No necrotic tissue.  - no purulent drainage, no odor.   - No s/s of acute infection; no drainable collections.  - (+) bedbound  - severe cachexia; protruding bony prominences.  - Incontinent urine and stool.  - poor PO intake, FTT  - CT AP 1/7/23 sacral ulcer increased in size as compared to CT 4/20/22, chronic osteomyelitis, air extends from the level of the decubitus into the spinal canal, no associated fluid collection.  - Will continue with Dakins twice a day in setting of urinary and fecal incontinence; patient frail, severe protein calorie malnutrition; wound unlikely to markedly improve, goal of care to maintain, provide antimicrobial support.  - Topical Recommendations: Cleanse wound and periwound with NS. Dry well. Apply Liquid barrier film to periwound skin. Apply silicone contact layer to wound base (to cover sharp bone exposed), Pack areas of undermining and dead space with Dakins 1/4 strength moistened kerlix. Cover with abdominal pad and tegaderm, change twice a day,  - Continue low airloss support surface, t&P per protocol with use of fluidized postioning devices.  - Perineal care per protocol, once dressing is in place apply Adi moisture barrier cream every shift and prn with episodes of incontinence. Continue use of single breathable incontinence pad.  - Continue use of complete cair boots.  -As per nursing patient with soft stools  -If loose stools then recommend External fecal      Right ischium reactive hyperemia   -Continue to offload as per hospital protocol  -Monitor for tissue type changes   -Apply liquid barrier film daily.     FTT  - Nutrition recommendations appreciated, severe protein calorie malnutrition.  - Poor PO intake.   - Strongly recommend goals of care discussion     *Leukocytosis resolved, patient afebrile sacral stage 4 w/o signs of acute infection.   Patient remains with poor PO intake, no enteral feeds, FTT, dementia, [place]'s disease, bedbound, severe cachexia temporal and muscle wasting, unable to follow commands, stage 4 pressure injury, incontinent urine and stool. All this factors put patient at risks for further skin impairment and or delayed/lack of wound healing.       Upon discharge f/u as outpatient at [facility] [address] [place], [place] [phone]  Findings and plan discussed with primary team.   Will follow periodically while inpatient, please reconsult earlier as needed.  Remainder of care per primary team.    Continue low airloss support surface.  Continue to turn and position every 2 hours with z-kierra fluidized positioning device.  Continue use of Complete Cair pressure offloading boots.  Continue Nutritional management as per RD recommendations for severe calorie protein malnutrition.    Upon discharge follow up at outpatient [facility]. [address] [place] [address]. [phone].    Will continue to follow while inpatient. Please reconsult eralier if needed it.   Thank you.  Myriam Hayes, MICHEAL-BC, CWOC    pager #[phone] or available in MS teams     If after 4PM or before 7:30AM on Mon-Friday or weekend/holiday please contact general surgery for urgent matters.   Team A- [id number]   Team B- [id number]  For non-urgent matters e-mail [email or web address]    I  spent 35 minutes face-to-face with this patient of which more than 50% of the time was spent counseling/coordinating care of this patient.

## 2023-01-24 NOTE — PROGRESS NOTE ADULT - SUBJECTIVE AND OBJECTIVE BOX
[facility]-- WOUND TEAM -- FOLLOW UP NOTE  --------------------------------------------------------------------------------    subjective: Patient seen and examined at bedside. Patient not communicating. Unable to provide HPI.     Interval HPI/24 hour events:   Episodes of HTN   Pending D/C to [facility]   As per nursing, patient remains with poor oral intake, spitting out food.   Palliative team consult to discuss GOC on 11/13, patient remains DNR/DNI. No feeding tube as per notes.   Chart reviewed including labs and relevant images    Diet:  Diet, Pureed:   Mildly Thick Liquids (MILDTHICKLIQS) (01-13-23 @ 09:45)    ROS: pt unable to offer    ALLERGIES & MEDICATIONS  --------------------------------------------------------------------------------  Allergies    No Known Allergies    Intolerances      STANDING INPATIENT MEDICATIONS    amLODIPine   Tablet 5 milliGRAM(s) Oral daily  ascorbic acid 500 milliGRAM(s) Oral daily  chlorhexidine 2% Cloths 1 Application(s) Topical daily  Dakins Solution - 1/4 Strength 1 Application(s) Topical two times a day  ferrous    sulfate 325 milliGRAM(s) Oral daily  levETIRAcetam  IVPB 500 milliGRAM(s) IV Intermittent every 12 hours  multivitamin 1 Tablet(s) Oral daily  polyethylene glycol 3350 17 Gram(s) Oral two times a day  QUEtiapine 25 milliGRAM(s) Oral three times a day  traZODone 150 milliGRAM(s) Oral daily    PRN INPATIENT MEDICATION  acetaminophen     Tablet .. 650 milliGRAM(s) Oral every 6 hours PRN  melatonin 3 milliGRAM(s) Oral at bedtime PRN    Vital signs:  T(C): 36.9 (01-24-23 @ 12:47), Max: 36.9 (01-24-23 @ 12:47)  HR: 86 (01-24-23 @ 14:29) (85 - 109)  BP: 134/82 (01-24-23 @ 14:29) (134/82 - 141/109)  RR: 16 (01-24-23 @ 14:29) (16 - 18)  SpO2: 100% (01-24-23 @ 14:29) (100% - 100%)    Wt(kg): -- 83.7 lbs (01-)    Constitutional: NAD, eyes open, severely cachetic, not communicating, temporal wasting, protruding bony prominences.  (+) low airloss support surface, (+) fluidized positioning devices, (+) complete cair boots.   HEENT:  NC/AT, eyes open, mucosa dry, poor dentition/missing teeth  Cardiovascular: rate regular  Respiratory: nonlabored, equal chest expansion, room air  Gastrointestinal: soft NT/ND, incontinent of small soft stool during skin assessment, incontinence care provided. External hemorrhoid   : incontinent urine during exam, perineal care provided, external female urinary collection device;   Neurology: (+) dementia, unable to follow commands, nonverbal  functional quadriplegic   Musculoskeletal:  limited, left hand and digits with rigidity and hyperextension. B/L LE flaccid in extension. Mild foot drop worse to left foot.   Vascular: BLE equally warm, +2 dp pulses, capillary refill < 3 seconds.  Skin:  moist w/ good turgor  Forehead with hypopigmentation healed wound?  Right knee with hyperpigmnetation   Right ischium with hyperpigmentation/hypopigmentation and Reactive hyperemia- [id number]. No tissue type changes palpated.   Sacrum stage 4 pressure injury- patient turned to left side- 6.5cmx9.5cmx2.3cm, prev [id number], undermining from 10-5 o'clock extending from 1.7cm to 2.5cm from 1-5 o'clock, (prev with 3.5cm at 8-9 o'clock). Minimal sharp bone exposed in center, Less bone exposed beneath areas of undermining. Tissue type with mixed granular base and viable red muscle. No necrotic tissue. No purulent drainage, no odor. Periwound at 5 o'clock 1cm from wound edge with skin slippage exposed pink-moist dermis and yellow fibrinous tissue (measures 1cmx0.5cmx0.2cm) remainder of periwound skin with chronic hyperpigmentation. No purple hue. No induration, no increased warmth. Remaining periwound skin no fluctuance, no induration, no associated cellulitis. No s/s of acute skin infection.  NO drainable abscess.

## 2023-01-25 LAB
ANION GAP SERPL CALC-SCNC: 7 MMOL/L — SIGNIFICANT CHANGE UP (ref 7–14)
BASOPHILS # BLD AUTO: 0.02 K/UL — SIGNIFICANT CHANGE UP (ref 0–0.2)
BASOPHILS NFR BLD AUTO: 0.2 % — SIGNIFICANT CHANGE UP (ref 0–2)
BUN SERPL-MCNC: 14 MG/DL — SIGNIFICANT CHANGE UP (ref 7–23)
CALCIUM SERPL-MCNC: 8.9 MG/DL — SIGNIFICANT CHANGE UP (ref 8.4–10.5)
CHLORIDE SERPL-SCNC: 109 MMOL/L — HIGH (ref 98–107)
CO2 SERPL-SCNC: 27 MMOL/L — SIGNIFICANT CHANGE UP (ref 22–31)
CREAT SERPL-MCNC: 0.34 MG/DL — LOW (ref 0.5–1.3)
EGFR: 123 ML/MIN/1.73M2 — SIGNIFICANT CHANGE UP
EOSINOPHIL # BLD AUTO: 0.02 K/UL — SIGNIFICANT CHANGE UP (ref 0–0.5)
EOSINOPHIL NFR BLD AUTO: 0.2 % — SIGNIFICANT CHANGE UP (ref 0–6)
GLUCOSE SERPL-MCNC: 119 MG/DL — HIGH (ref 70–99)
HCT VFR BLD CALC: 33 % — LOW (ref 34.5–45)
HGB BLD-MCNC: 10.2 G/DL — LOW (ref 11.5–15.5)
IANC: 7.86 K/UL — HIGH (ref 1.8–7.4)
IMM GRANULOCYTES NFR BLD AUTO: 0.3 % — SIGNIFICANT CHANGE UP (ref 0–0.9)
LYMPHOCYTES # BLD AUTO: 0.72 K/UL — LOW (ref 1–3.3)
LYMPHOCYTES # BLD AUTO: 7.8 % — LOW (ref 13–44)
MAGNESIUM SERPL-MCNC: 1.7 MG/DL — SIGNIFICANT CHANGE UP (ref 1.6–2.6)
MCHC RBC-ENTMCNC: 27.9 PG — SIGNIFICANT CHANGE UP (ref 27–34)
MCHC RBC-ENTMCNC: 30.9 GM/DL — LOW (ref 32–36)
MCV RBC AUTO: 90.4 FL — SIGNIFICANT CHANGE UP (ref 80–100)
MONOCYTES # BLD AUTO: 0.55 K/UL — SIGNIFICANT CHANGE UP (ref 0–0.9)
MONOCYTES NFR BLD AUTO: 6 % — SIGNIFICANT CHANGE UP (ref 2–14)
NEUTROPHILS # BLD AUTO: 7.86 K/UL — HIGH (ref 1.8–7.4)
NEUTROPHILS NFR BLD AUTO: 85.5 % — HIGH (ref 43–77)
NRBC # BLD: 0 /100 WBCS — SIGNIFICANT CHANGE UP (ref 0–0)
NRBC # FLD: 0 K/UL — SIGNIFICANT CHANGE UP (ref 0–0)
PHOSPHATE SERPL-MCNC: 1.9 MG/DL — LOW (ref 2.5–4.5)
PLATELET # BLD AUTO: 393 K/UL — SIGNIFICANT CHANGE UP (ref 150–400)
POTASSIUM SERPL-MCNC: 4 MMOL/L — SIGNIFICANT CHANGE UP (ref 3.5–5.3)
POTASSIUM SERPL-SCNC: 4 MMOL/L — SIGNIFICANT CHANGE UP (ref 3.5–5.3)
PROCALCITONIN SERPL-MCNC: 0.05 NG/ML — SIGNIFICANT CHANGE UP (ref 0.02–0.1)
RBC # BLD: 3.65 M/UL — LOW (ref 3.8–5.2)
RBC # FLD: 15 % — HIGH (ref 10.3–14.5)
SARS-COV-2 RNA SPEC QL NAA+PROBE: SIGNIFICANT CHANGE UP
SODIUM SERPL-SCNC: 143 MMOL/L — SIGNIFICANT CHANGE UP (ref 135–145)
WBC # BLD: 9.2 K/UL — SIGNIFICANT CHANGE UP (ref 3.8–10.5)
WBC # FLD AUTO: 9.2 K/UL — SIGNIFICANT CHANGE UP (ref 3.8–10.5)

## 2023-01-25 PROCEDURE — 71045 X-RAY EXAM CHEST 1 VIEW: CPT | Mod: 26

## 2023-01-25 RX ADMIN — Medication 150 MILLIGRAM(S): at 11:29

## 2023-01-25 RX ADMIN — Medication 500 MILLIGRAM(S): at 11:29

## 2023-01-25 RX ADMIN — QUETIAPINE FUMARATE 25 MILLIGRAM(S): 200 TABLET, FILM COATED ORAL at 21:27

## 2023-01-25 RX ADMIN — Medication 1 APPLICATION(S): at 21:53

## 2023-01-25 RX ADMIN — Medication 650 MILLIGRAM(S): at 21:26

## 2023-01-25 RX ADMIN — Medication 1 TABLET(S): at 11:29

## 2023-01-25 RX ADMIN — QUETIAPINE FUMARATE 25 MILLIGRAM(S): 200 TABLET, FILM COATED ORAL at 05:20

## 2023-01-25 RX ADMIN — CHLORHEXIDINE GLUCONATE 1 APPLICATION(S): 213 SOLUTION TOPICAL at 11:30

## 2023-01-25 RX ADMIN — LEVETIRACETAM 500 MILLIGRAM(S): 250 TABLET, FILM COATED ORAL at 05:20

## 2023-01-25 RX ADMIN — POLYETHYLENE GLYCOL 3350 17 GRAM(S): 17 POWDER, FOR SOLUTION ORAL at 05:21

## 2023-01-25 RX ADMIN — AMLODIPINE BESYLATE 5 MILLIGRAM(S): 2.5 TABLET ORAL at 05:20

## 2023-01-25 RX ADMIN — Medication 1 APPLICATION(S): at 05:21

## 2023-01-25 RX ADMIN — Medication 325 MILLIGRAM(S): at 11:29

## 2023-01-25 RX ADMIN — LEVETIRACETAM 500 MILLIGRAM(S): 250 TABLET, FILM COATED ORAL at 18:32

## 2023-01-25 RX ADMIN — POLYETHYLENE GLYCOL 3350 17 GRAM(S): 17 POWDER, FOR SOLUTION ORAL at 18:33

## 2023-01-25 RX ADMIN — QUETIAPINE FUMARATE 25 MILLIGRAM(S): 200 TABLET, FILM COATED ORAL at 14:06

## 2023-01-25 RX ADMIN — Medication 650 MILLIGRAM(S): at 22:26

## 2023-01-25 NOTE — CHART NOTE - NSCHARTNOTEFT_GEN_A_CORE
Notified by RN of patient with fever Temp 101.2F    Vitals: , /88 SP02 100% on RA RR 18  Patient meets SIRS criteria with fever and HR. Patient seen at bedside Exam: General Thin female A+Ox0 non verbal at baseline During admission patient had FUO CT C/A/P w/ LLL groundglass opacities, nonspecific/possibly infectious, chronic sacral OM- ID consulted: s/p Zosyn (1/4-1/13)  Heart: RRR Lungs CLA. Fever workup including CBC w diff, Blood cx x2, UA, Urine Cx, CXR ordered. Tylenol given for fever. Discussed with RN to repeat temp in 1 hour.

## 2023-01-25 NOTE — PROGRESS NOTE ADULT - ASSESSMENT
53 yo female with a PMHx of traumatic subdural hemorrhage, dementia, HTN,  who was BIMBEMS from [facility] for failure to thrive. Facility states that for the past 3 days the pt has been spitting out her medications and food, and has significantly decreased her PO intake. Per the facility, the pt is A&Ox0 and nonverbal at baseline. She does occasionally follow commands. On examination here pt was not following commands    1 FTT  - poor po inatke  - calorie count   - will follow recs  - diet as per speech and swallow     2 Fever  - unclear etiology  - fu  blood cultures  - ID fu  -  finished ABX    3 Hypernatremia  - monitor BMP  - IVF   - renal fu     4 HTN  - cw home meds  - DASH diet       dc planning  palliative fu     medically stable for dc , pending rehab

## 2023-01-25 NOTE — PROGRESS NOTE ADULT - SUBJECTIVE AND OBJECTIVE BOX
Patient is a 52y old  Female who presents with a chief complaint of FTT (24 Jan 2023 15:02)    Date of servie : 01-25-23 @ 13:26  INTERVAL HPI/OVERNIGHT EVENTS:  T(C): 36.7 (01-25-23 @ 12:06), Max: 36.7 (01-25-23 @ 12:06)  HR: 107 (01-25-23 @ 12:06) (85 - 107)  BP: 135/96 (01-25-23 @ 12:06) (115/80 - 137/89)  RR: 18 (01-25-23 @ 12:06) (16 - 18)  SpO2: 100% (01-25-23 @ 12:06) (100% - 100%)  Wt(kg): --  I&O's Summary      LABS:              CAPILLARY BLOOD GLUCOSE                MEDICATIONS  (STANDING):  amLODIPine   Tablet 5 milliGRAM(s) Oral daily  ascorbic acid 500 milliGRAM(s) Oral daily  chlorhexidine 2% Cloths 1 Application(s) Topical daily  Dakins Solution - 1/4 Strength 1 Application(s) Topical two times a day  ferrous    sulfate 325 milliGRAM(s) Oral daily  levETIRAcetam 500 milliGRAM(s) Oral two times a day  multivitamin 1 Tablet(s) Oral daily  polyethylene glycol 3350 17 Gram(s) Oral two times a day  QUEtiapine 25 milliGRAM(s) Oral three times a day  traZODone 150 milliGRAM(s) Oral daily    MEDICATIONS  (PRN):  acetaminophen     Tablet .. 650 milliGRAM(s) Oral every 6 hours PRN Temp greater or equal to 38C (100.4F), Mild Pain (1 - 3), Moderate Pain (4 - 6)  melatonin 3 milliGRAM(s) Oral at bedtime PRN Insomnia          PHYSICAL EXAM:  GENERAL: NAD, well-groomed, well-developed  HEAD:  Atraumatic, Normocephalic  CHEST/LUNG: Clear to percussion bilaterally; No rales, rhonchi, wheezing, or rubs  HEART: Regular rate and rhythm; No murmurs, rubs, or gallops  ABDOMEN: Soft, Nontender, Nondistended; Bowel sounds present  EXTREMITIES:  2+ Peripheral Pulses, No clubbing, cyanosis, or edema  LYMPH: No lymphadenopathy noted  SKIN: No rashes or lesions    Care Discussed with Consultants/Other Providers [ ] YES  [ ] NO

## 2023-01-26 LAB
APPEARANCE UR: CLEAR — SIGNIFICANT CHANGE UP
BACTERIA # UR AUTO: NEGATIVE — SIGNIFICANT CHANGE UP
BILIRUB UR-MCNC: NEGATIVE — SIGNIFICANT CHANGE UP
COLOR SPEC: YELLOW — SIGNIFICANT CHANGE UP
DIFF PNL FLD: NEGATIVE — SIGNIFICANT CHANGE UP
EPI CELLS # UR: 0 /HPF — SIGNIFICANT CHANGE UP (ref 0–5)
GLUCOSE UR QL: NEGATIVE — SIGNIFICANT CHANGE UP
KETONES UR-MCNC: NEGATIVE — SIGNIFICANT CHANGE UP
LEUKOCYTE ESTERASE UR-ACNC: NEGATIVE — SIGNIFICANT CHANGE UP
NITRITE UR-MCNC: NEGATIVE — SIGNIFICANT CHANGE UP
PH UR: 7 — SIGNIFICANT CHANGE UP (ref 5–8)
PROT UR-MCNC: ABNORMAL
RBC CASTS # UR COMP ASSIST: 26 /HPF — HIGH (ref 0–4)
SP GR SPEC: 1.03 — SIGNIFICANT CHANGE UP (ref 1.01–1.05)
UROBILINOGEN FLD QL: ABNORMAL
WBC UR QL: 1 /HPF — SIGNIFICANT CHANGE UP (ref 0–5)

## 2023-01-26 PROCEDURE — 99233 SBSQ HOSP IP/OBS HIGH 50: CPT

## 2023-01-26 RX ORDER — PIPERACILLIN AND TAZOBACTAM 4; .5 G/20ML; G/20ML
3.38 INJECTION, POWDER, LYOPHILIZED, FOR SOLUTION INTRAVENOUS ONCE
Refills: 0 | Status: DISCONTINUED | OUTPATIENT
Start: 2023-01-27 | End: 2023-01-27

## 2023-01-26 RX ORDER — PIPERACILLIN AND TAZOBACTAM 4; .5 G/20ML; G/20ML
3.38 INJECTION, POWDER, LYOPHILIZED, FOR SOLUTION INTRAVENOUS EVERY 8 HOURS
Refills: 0 | Status: DISCONTINUED | OUTPATIENT
Start: 2023-01-27 | End: 2023-02-02

## 2023-01-26 RX ORDER — POTASSIUM PHOSPHATE, MONOBASIC POTASSIUM PHOSPHATE, DIBASIC 236; 224 MG/ML; MG/ML
15 INJECTION, SOLUTION INTRAVENOUS ONCE
Refills: 0 | Status: COMPLETED | OUTPATIENT
Start: 2023-01-26 | End: 2023-01-26

## 2023-01-26 RX ORDER — PIPERACILLIN AND TAZOBACTAM 4; .5 G/20ML; G/20ML
3.38 INJECTION, POWDER, LYOPHILIZED, FOR SOLUTION INTRAVENOUS ONCE
Refills: 0 | Status: DISCONTINUED | OUTPATIENT
Start: 2023-01-26 | End: 2023-01-26

## 2023-01-26 RX ORDER — SODIUM CHLORIDE 9 MG/ML
1000 INJECTION INTRAMUSCULAR; INTRAVENOUS; SUBCUTANEOUS
Refills: 0 | Status: DISCONTINUED | OUTPATIENT
Start: 2023-01-26 | End: 2023-02-02

## 2023-01-26 RX ORDER — PIPERACILLIN AND TAZOBACTAM 4; .5 G/20ML; G/20ML
3.38 INJECTION, POWDER, LYOPHILIZED, FOR SOLUTION INTRAVENOUS ONCE
Refills: 0 | Status: COMPLETED | OUTPATIENT
Start: 2023-01-26 | End: 2023-01-26

## 2023-01-26 RX ADMIN — QUETIAPINE FUMARATE 25 MILLIGRAM(S): 200 TABLET, FILM COATED ORAL at 23:50

## 2023-01-26 RX ADMIN — SODIUM CHLORIDE 75 MILLILITER(S): 9 INJECTION INTRAMUSCULAR; INTRAVENOUS; SUBCUTANEOUS at 07:51

## 2023-01-26 RX ADMIN — POLYETHYLENE GLYCOL 3350 17 GRAM(S): 17 POWDER, FOR SOLUTION ORAL at 18:13

## 2023-01-26 RX ADMIN — LEVETIRACETAM 500 MILLIGRAM(S): 250 TABLET, FILM COATED ORAL at 18:13

## 2023-01-26 RX ADMIN — Medication 1 APPLICATION(S): at 05:20

## 2023-01-26 RX ADMIN — CHLORHEXIDINE GLUCONATE 1 APPLICATION(S): 213 SOLUTION TOPICAL at 11:46

## 2023-01-26 RX ADMIN — PIPERACILLIN AND TAZOBACTAM 200 GRAM(S): 4; .5 INJECTION, POWDER, LYOPHILIZED, FOR SOLUTION INTRAVENOUS at 23:50

## 2023-01-26 RX ADMIN — LEVETIRACETAM 500 MILLIGRAM(S): 250 TABLET, FILM COATED ORAL at 05:19

## 2023-01-26 RX ADMIN — QUETIAPINE FUMARATE 25 MILLIGRAM(S): 200 TABLET, FILM COATED ORAL at 05:19

## 2023-01-26 RX ADMIN — Medication 150 MILLIGRAM(S): at 11:46

## 2023-01-26 RX ADMIN — QUETIAPINE FUMARATE 25 MILLIGRAM(S): 200 TABLET, FILM COATED ORAL at 13:53

## 2023-01-26 RX ADMIN — Medication 1 TABLET(S): at 11:45

## 2023-01-26 RX ADMIN — Medication 1 APPLICATION(S): at 18:12

## 2023-01-26 RX ADMIN — Medication 500 MILLIGRAM(S): at 11:45

## 2023-01-26 RX ADMIN — AMLODIPINE BESYLATE 5 MILLIGRAM(S): 2.5 TABLET ORAL at 05:19

## 2023-01-26 RX ADMIN — POLYETHYLENE GLYCOL 3350 17 GRAM(S): 17 POWDER, FOR SOLUTION ORAL at 05:20

## 2023-01-26 RX ADMIN — Medication 325 MILLIGRAM(S): at 11:46

## 2023-01-26 RX ADMIN — POTASSIUM PHOSPHATE, MONOBASIC POTASSIUM PHOSPHATE, DIBASIC 62.5 MILLIMOLE(S): 236; 224 INJECTION, SOLUTION INTRAVENOUS at 05:16

## 2023-01-26 NOTE — PROGRESS NOTE ADULT - ASSESSMENT
53 yo female with a PMHx of traumatic subdural hemorrhage, dementia, HTN,  who was BIMBEMS from [facility] for failure to thrive. Facility states that for the past 3 days the pt has been spitting out her medications and food, and has significantly decreased her PO intake. Per the facility, the pt is A&Ox0 and nonverbal at baseline. She does occasionally follow commands. On examination here pt was not following commands    1 FTT  - poor po inatke  - calorie count   - will follow recs  - diet as per speech and swallow     2 Fever, recurrent   - unclear etiology  - fu cultures  - ID called back     3 Hypernatremia  - monitor BMP  - IVF   - renal fu     4 HTN  - cw home meds  - DASH diet

## 2023-01-26 NOTE — PROGRESS NOTE ADULT - SUBJECTIVE AND OBJECTIVE BOX
52yPatient is a 52y old  Female who presents with a chief complaint of FTT (26 Jan 2023 16:52)      Interval history:  Febrile, no diarrhea per RN, not on supplemental oxygen.       Allergies:   No Known Allergies      Antimicrobials:      REVIEW OF SYSTEMS:  Unable to obtain due to pt's underlying mental status.       Vital Signs Last 24 Hrs  T(C): 36.4 (01-26-23 @ 14:42), Max: 38.4 (01-25-23 @ 20:56)  T(F): 97.5 (01-26-23 @ 14:42), Max: 101.2 (01-25-23 @ 20:56)  HR: 102 (01-26-23 @ 14:42) (100 - 125)  BP: 145/107 (01-26-23 @ 14:42) (128/88 - 155/100)  BP(mean): --  RR: 18 (01-26-23 @ 14:42) (17 - 18)  SpO2: 100% (01-26-23 @ 14:42) (100% - 100%)      PHYSICAL EXAM:  Pt in no acute distress, awake. not verbal.   + air entry b/l.   non distended abdomen  no edema LE   no phlebitis   decubitus ulcer large but appears clean.                               10.2   9.20  )-----------( 393      ( 25 Jan 2023 21:28 )             33.0   01-25    143  |  109<H>  |  14  ----------------------------<  119<H>  4.0   |  27  |  0.34<L>    Ca    8.9      25 Jan 2023 21:28  Phos  1.9     01-25  Mg     1.70     01-25        Urinalysis + Microscopic Examination (01.26.23 @ 14:55)   Urine Appearance: Clear   Urobilinogen: 3 mg/dL   Specific Gravity: 1.031   Protein, Urine: 30 mg/dL   pH Urine: 7.0   Leukocyte Esterase Concentration: Negative   Nitrite: Negative   Ketone - Urine: Negative   Bilirubin: Negative   Color: Yellow   Glucose Qualitative, Urine: Negative   Blood, Urine: Negative   Red Blood Cell - Urine: 26 /HPF   White Blood Cell - Urine: 1 /HPF   Epithelial Cells: 0 /HPF   Bacteria: Negative     Radiology:  < from: Xray Chest 1 View- PORTABLE-Urgent (Xray Chest 1 View- PORTABLE-Urgent .) (01.25.23 @ 22:43) >    IMPRESSION:  No acute pulmonary disease.

## 2023-01-26 NOTE — PROGRESS NOTE ADULT - SUBJECTIVE AND OBJECTIVE BOX
Patient is a 52y old  Female who presents with a chief complaint of FTT (2023 13:26)    Date of servie : 23 @ 16:53  INTERVAL HPI/OVERNIGHT EVENTS:  T(C): 36.4 (23 @ 14:42), Max: 38.4 (23 @ 20:56)  HR: 102 (23 @ 14:42) (100 - 125)  BP: 145/107 (23 @ 14:42) (128/88 - 155/100)  RR: 18 (23 @ 14:42) (17 - 18)  SpO2: 100% (23 @ 14:42) (100% - 100%)  Wt(kg): --  I&O's Summary    2023 07:  -  2023 07:00  --------------------------------------------------------  IN: 0 mL / OUT: 0 mL / NET: 0 mL    2023 07:  -  2023 16:53  --------------------------------------------------------  IN: 450 mL / OUT: 400 mL / NET: 50 mL        LABS:                        10.2   9.20  )-----------( 393      ( 2023 21:28 )             33.0         143  |  109<H>  |  14  ----------------------------<  119<H>  4.0   |  27  |  0.34<L>    Ca    8.9      2023 21:28  Phos  1.9       Mg     1.70             Urinalysis Basic - ( 2023 14:55 )    Color: Yellow / Appearance: Clear / S.031 / pH: x  Gluc: x / Ketone: Negative  / Bili: Negative / Urobili: 3 mg/dL   Blood: x / Protein: 30 mg/dL / Nitrite: Negative   Leuk Esterase: Negative / RBC: 26 /HPF / WBC 1 /HPF   Sq Epi: x / Non Sq Epi: 0 /HPF / Bacteria: Negative      CAPILLARY BLOOD GLUCOSE            Urinalysis Basic - ( 2023 14:55 )    Color: Yellow / Appearance: Clear / S.031 / pH: x  Gluc: x / Ketone: Negative  / Bili: Negative / Urobili: 3 mg/dL   Blood: x / Protein: 30 mg/dL / Nitrite: Negative   Leuk Esterase: Negative / RBC: 26 /HPF / WBC 1 /HPF   Sq Epi: x / Non Sq Epi: 0 /HPF / Bacteria: Negative        MEDICATIONS  (STANDING):  amLODIPine   Tablet 5 milliGRAM(s) Oral daily  ascorbic acid 500 milliGRAM(s) Oral daily  chlorhexidine 2% Cloths 1 Application(s) Topical daily  Dakins Solution - 1/4 Strength 1 Application(s) Topical two times a day  ferrous    sulfate 325 milliGRAM(s) Oral daily  levETIRAcetam 500 milliGRAM(s) Oral two times a day  multivitamin 1 Tablet(s) Oral daily  polyethylene glycol 3350 17 Gram(s) Oral two times a day  QUEtiapine 25 milliGRAM(s) Oral three times a day  sodium chloride 0.9%. 1000 milliLiter(s) (75 mL/Hr) IV Continuous <Continuous>  traZODone 150 milliGRAM(s) Oral daily    MEDICATIONS  (PRN):  acetaminophen     Tablet .. 650 milliGRAM(s) Oral every 6 hours PRN Temp greater or equal to 38C (100.4F), Mild Pain (1 - 3), Moderate Pain (4 - 6)  melatonin 3 milliGRAM(s) Oral at bedtime PRN Insomnia          PHYSICAL EXAM:  GENERAL: NAD, well-groomed, well-developed  HEAD:  Atraumatic, Normocephalic  CHEST/LUNG: Clear to percussion bilaterally; No rales, rhonchi, wheezing, or rubs  HEART: Regular rate and rhythm; No murmurs, rubs, or gallops  ABDOMEN: Soft, Nontender, Nondistended; Bowel sounds present  EXTREMITIES:  2+ Peripheral Pulses, No clubbing, cyanosis, or edema  LYMPH: No lymphadenopathy noted  SKIN: No rashes or lesions    Care Discussed with Consultants/Other Providers [x ] YES  [ ] NO

## 2023-01-26 NOTE — PROVIDER CONTACT NOTE (OTHER) - DATE AND TIME:
Abdominal pain with hematemesis
12-Jan-2023 07:00
12-Jan-2023 13:00
21-Jan-2023 22:20
22-Jan-2023 06:12
23-Jan-2023 21:19
26-Jan-2023 02:19
24-Jan-2023 23:50
25-Jan-2023 21:00
22-Jan-2023 09:40
21-Jan-2023 23:36

## 2023-01-26 NOTE — PROVIDER CONTACT NOTE (OTHER) - ASSESSMENT
, Electronic /106, Manual /98. No s/s of pain, chest pain, SOB, or stroke. Pt. asymptomatic
Manual /102. No s/s of pain, chest pain, SOB, dyspnea, or stroke.
Patient's baseline BP is high, VS stable otherwise
Pt has not voided since orders placed. Bladder scan 113cc
/102 . Pt. asymptomatic. No s/s of pain, chest pain, SOB, dyspnea, or stroke.
Warm to touch. pt nonverbal
/100 . Pt. nonverbal. No s/s of pain, chest pain, SOB, dyspnea, or stroke.
Will monitor for changes
manual /110, , Temp 97.4, SpO2 100%, RR 16. No s/s of pain, chest pain, SOB, dyspnea, or stroke. Pt. asymptomatic

## 2023-01-26 NOTE — PROVIDER CONTACT NOTE (OTHER) - REASON
/102
/100
/102
/111 
/90 manual
Pt. diastolic BP running high
Poor Apetite
UA unable to be sent at this time, no void since 9pm
oral temp 101.2, 
/110

## 2023-01-26 NOTE — PROVIDER CONTACT NOTE (OTHER) - SITUATION
Pt demented, bed ridden and has stage 4 sacral decubiti.Pt on puree diet, staff attempted to feed pt but unsuscessful
Pt. /102
/90 manual
Pt. electronic /100, manual /100
/111 
Pt. manual /102
Pt. diastolic BP running high
oral temp 101.2, 
Pt spiked temp around 9pm. UA and urine cx ordered for workup. At time of orders, pt soiled and urine not able to be collected. Primafit applied. Pt without void since 9pm.
Pt. manual /110

## 2023-01-26 NOTE — PROGRESS NOTE ADULT - ASSESSMENT
53 yo female with a PMHx of traumatic subdural hemorrhage, dementia, HTN,  who was BIMBEMS from [facility] for failure to thrive.     Overall fever, tachycardia, sepsis, ? source.   CXR with no pneumonia  U/A negative       PLAN:   restarted zosyn,   f/u blood cx, in lab   trend cbc, for leucocytosis, none currently   sacral decubitus with underlying chronic OM, does not appear infected at this time.   c/w aggressive wound care, Frequent turning and positioning, Glycemic control, Optimize nutrition.   recommend repeat CT chest/abd/pelvis to look for source.       Plan discussed with Medicine PA.      Radha Ramirez  Please contact through MS Teams   If no response or past 5 pm/weekend call [phone].

## 2023-01-26 NOTE — PROVIDER CONTACT NOTE (OTHER) - NAME OF MD/NP/PA/DO NOTIFIED:
Ava Chen
ALCIDES Tucker
Sheyla Triplett
acp lora mccollum
ACP Siri Cuellar
Sheyla Triplett
JAIRO Schneider
JAIRO escudero
Sheyla Triplett
Ava Chen

## 2023-01-26 NOTE — PROVIDER CONTACT NOTE (OTHER) - BACKGROUND
FTT. completed course of abx. pt is pending transfer to LTC, awaiting authorization. covid neg from this AM.
Pt. admitted for failure to thrive. DBP has been running in the 90s for the past few days.
hyperosmolality with hypernatremia, HTN, Dementia
FTT. Pt originally cleared for d/c back to LTC, pending auth. S/p zosyn 1/4-1/13
Pt. admitted for failure to thrive. DBP has been running in the high 90s
admitted for hypernatremia and FTT
Pt. admitted for failure to thrive. Pt. had DBP in 100s two days ago.
Pt. admitted for failure to thrive. Diastolic BP has been running in the high 90s for the past few days
Pt. admitted for failure to thrive. Yesterday, Pt. DBP in the 100s

## 2023-01-26 NOTE — PROVIDER CONTACT NOTE (OTHER) - ACTION/TREATMENT ORDERED:
Will continue plan of care
ACP aware. Give AM dose of amlodipine. Continue to monitor.
provider made aware , awaiting further instructions
ACP notified & aware. No new orders at this time. Continue to hydrate Pt. orally. Continue to monitor.
ACP notified. As per ACP, if pt is retaining on bladder scan, can straight cath for specimen. Bladder scan done with 113cc. To be bladder scanned again at 6am. No new orders at this time.
acp notified. awaiting orders.
ACP notified & aware. No further orders at this time. Continue to monitor.
ACP notified & aware. Please recheck manual BP in an hour.
ACP notified & aware. Recheck BP in an hour.
NP carlos escudero notified awaiting orders

## 2023-01-26 NOTE — CHART NOTE - NSCHARTNOTEFT_GEN_A_CORE
Provider contacted ID regarding patient with fever of 101.2 and tachycardia overnight. Infectious work up pending. Appreciate ID recommendations.

## 2023-01-27 LAB
ANION GAP SERPL CALC-SCNC: 9 MMOL/L — SIGNIFICANT CHANGE UP (ref 7–14)
BASOPHILS # BLD AUTO: 0.02 K/UL — SIGNIFICANT CHANGE UP (ref 0–0.2)
BASOPHILS NFR BLD AUTO: 0.3 % — SIGNIFICANT CHANGE UP (ref 0–2)
BUN SERPL-MCNC: 8 MG/DL — SIGNIFICANT CHANGE UP (ref 7–23)
CALCIUM SERPL-MCNC: 9.1 MG/DL — SIGNIFICANT CHANGE UP (ref 8.4–10.5)
CHLORIDE SERPL-SCNC: 106 MMOL/L — SIGNIFICANT CHANGE UP (ref 98–107)
CO2 SERPL-SCNC: 25 MMOL/L — SIGNIFICANT CHANGE UP (ref 22–31)
CREAT SERPL-MCNC: 0.27 MG/DL — LOW (ref 0.5–1.3)
EGFR: 130 ML/MIN/1.73M2 — SIGNIFICANT CHANGE UP
EOSINOPHIL # BLD AUTO: 0.04 K/UL — SIGNIFICANT CHANGE UP (ref 0–0.5)
EOSINOPHIL NFR BLD AUTO: 0.5 % — SIGNIFICANT CHANGE UP (ref 0–6)
GLUCOSE SERPL-MCNC: 83 MG/DL — SIGNIFICANT CHANGE UP (ref 70–99)
HCT VFR BLD CALC: 32.9 % — LOW (ref 34.5–45)
HGB BLD-MCNC: 10.1 G/DL — LOW (ref 11.5–15.5)
IANC: 6.26 K/UL — SIGNIFICANT CHANGE UP (ref 1.8–7.4)
IMM GRANULOCYTES NFR BLD AUTO: 0.5 % — SIGNIFICANT CHANGE UP (ref 0–0.9)
LYMPHOCYTES # BLD AUTO: 1 K/UL — SIGNIFICANT CHANGE UP (ref 1–3.3)
LYMPHOCYTES # BLD AUTO: 12.6 % — LOW (ref 13–44)
MAGNESIUM SERPL-MCNC: 1.7 MG/DL — SIGNIFICANT CHANGE UP (ref 1.6–2.6)
MCHC RBC-ENTMCNC: 28 PG — SIGNIFICANT CHANGE UP (ref 27–34)
MCHC RBC-ENTMCNC: 30.7 GM/DL — LOW (ref 32–36)
MCV RBC AUTO: 91.1 FL — SIGNIFICANT CHANGE UP (ref 80–100)
MONOCYTES # BLD AUTO: 0.58 K/UL — SIGNIFICANT CHANGE UP (ref 0–0.9)
MONOCYTES NFR BLD AUTO: 7.3 % — SIGNIFICANT CHANGE UP (ref 2–14)
NEUTROPHILS # BLD AUTO: 6.26 K/UL — SIGNIFICANT CHANGE UP (ref 1.8–7.4)
NEUTROPHILS NFR BLD AUTO: 78.8 % — HIGH (ref 43–77)
NRBC # BLD: 0 /100 WBCS — SIGNIFICANT CHANGE UP (ref 0–0)
NRBC # FLD: 0 K/UL — SIGNIFICANT CHANGE UP (ref 0–0)
PHOSPHATE SERPL-MCNC: 2.5 MG/DL — SIGNIFICANT CHANGE UP (ref 2.5–4.5)
PLATELET # BLD AUTO: 395 K/UL — SIGNIFICANT CHANGE UP (ref 150–400)
POTASSIUM SERPL-MCNC: 4.1 MMOL/L — SIGNIFICANT CHANGE UP (ref 3.5–5.3)
POTASSIUM SERPL-SCNC: 4.1 MMOL/L — SIGNIFICANT CHANGE UP (ref 3.5–5.3)
RBC # BLD: 3.61 M/UL — LOW (ref 3.8–5.2)
RBC # FLD: 14.8 % — HIGH (ref 10.3–14.5)
SODIUM SERPL-SCNC: 140 MMOL/L — SIGNIFICANT CHANGE UP (ref 135–145)
WBC # BLD: 7.94 K/UL — SIGNIFICANT CHANGE UP (ref 3.8–10.5)
WBC # FLD AUTO: 7.94 K/UL — SIGNIFICANT CHANGE UP (ref 3.8–10.5)

## 2023-01-27 PROCEDURE — 99232 SBSQ HOSP IP/OBS MODERATE 35: CPT

## 2023-01-27 RX ORDER — PIPERACILLIN AND TAZOBACTAM 4; .5 G/20ML; G/20ML
3.38 INJECTION, POWDER, LYOPHILIZED, FOR SOLUTION INTRAVENOUS ONCE
Refills: 0 | Status: COMPLETED | OUTPATIENT
Start: 2023-01-27 | End: 2023-01-27

## 2023-01-27 RX ADMIN — LEVETIRACETAM 500 MILLIGRAM(S): 250 TABLET, FILM COATED ORAL at 17:47

## 2023-01-27 RX ADMIN — QUETIAPINE FUMARATE 25 MILLIGRAM(S): 200 TABLET, FILM COATED ORAL at 12:09

## 2023-01-27 RX ADMIN — Medication 650 MILLIGRAM(S): at 19:15

## 2023-01-27 RX ADMIN — PIPERACILLIN AND TAZOBACTAM 25 GRAM(S): 4; .5 INJECTION, POWDER, LYOPHILIZED, FOR SOLUTION INTRAVENOUS at 12:09

## 2023-01-27 RX ADMIN — Medication 500 MILLIGRAM(S): at 11:00

## 2023-01-27 RX ADMIN — Medication 650 MILLIGRAM(S): at 12:00

## 2023-01-27 RX ADMIN — QUETIAPINE FUMARATE 25 MILLIGRAM(S): 200 TABLET, FILM COATED ORAL at 06:56

## 2023-01-27 RX ADMIN — Medication 150 MILLIGRAM(S): at 11:00

## 2023-01-27 RX ADMIN — Medication 325 MILLIGRAM(S): at 11:00

## 2023-01-27 RX ADMIN — QUETIAPINE FUMARATE 25 MILLIGRAM(S): 200 TABLET, FILM COATED ORAL at 22:33

## 2023-01-27 RX ADMIN — Medication 650 MILLIGRAM(S): at 11:00

## 2023-01-27 RX ADMIN — PIPERACILLIN AND TAZOBACTAM 25 GRAM(S): 4; .5 INJECTION, POWDER, LYOPHILIZED, FOR SOLUTION INTRAVENOUS at 04:09

## 2023-01-27 RX ADMIN — PIPERACILLIN AND TAZOBACTAM 25 GRAM(S): 4; .5 INJECTION, POWDER, LYOPHILIZED, FOR SOLUTION INTRAVENOUS at 22:34

## 2023-01-27 RX ADMIN — CHLORHEXIDINE GLUCONATE 1 APPLICATION(S): 213 SOLUTION TOPICAL at 11:01

## 2023-01-27 RX ADMIN — Medication 650 MILLIGRAM(S): at 20:11

## 2023-01-27 RX ADMIN — Medication 1 TABLET(S): at 11:00

## 2023-01-27 RX ADMIN — AMLODIPINE BESYLATE 5 MILLIGRAM(S): 2.5 TABLET ORAL at 06:57

## 2023-01-27 RX ADMIN — LEVETIRACETAM 500 MILLIGRAM(S): 250 TABLET, FILM COATED ORAL at 06:56

## 2023-01-27 RX ADMIN — Medication 1 APPLICATION(S): at 17:47

## 2023-01-27 RX ADMIN — Medication 1 APPLICATION(S): at 06:57

## 2023-01-27 NOTE — PROGRESS NOTE ADULT - SUBJECTIVE AND OBJECTIVE BOX
52yPatient is a 52y old  Female who presents with a chief complaint of FTT (27 Jan 2023 13:46)      Interval history:  Afebrile, not verbal.       Allergies:   No Known Allergies      Antimicrobials:  piperacillin/tazobactam IVPB.. 3.375 Gram(s) IV Intermittent every 8 hours      REVIEW OF SYSTEMS:  Unable to obtain due to pt's underlying mental status.       Vital Signs Last 24 Hrs  T(C): 36.4 (01-27-23 @ 14:35), Max: 36.9 (01-26-23 @ 20:58)  T(F): 97.6 (01-27-23 @ 14:35), Max: 98.4 (01-26-23 @ 20:58)  HR: 92 (01-27-23 @ 14:35) (92 - 100)  BP: 137/91 (01-27-23 @ 14:35) (128/104 - 137/91)  BP(mean): --  RR: 18 (01-27-23 @ 14:35) (16 - 19)  SpO2: 100% (01-27-23 @ 14:35) (97% - 100%)      PHYSICAL EXAM:  Pt in no acute distress, awake. not verbal.   + air entry b/l.   non distended abdomen  no edema LE   no phlebitis   decubitus ulcer large but appears clean.                             10.1   7.94  )-----------( 395      ( 27 Jan 2023 06:30 )             32.9   01-27    140  |  106  |  8   ----------------------------<  83  4.1   |  25  |  0.27<L>    Ca    9.1      27 Jan 2023 06:30  Phos  2.5     01-27  Mg     1.70     01-27        Culture - Blood (collected 25 Jan 2023 23:50)  Source: .Blood Blood-Venous  Preliminary Report (27 Jan 2023 03:01):    No growth to date.    Culture - Blood (collected 25 Jan 2023 23:50)  Source: .Blood Blood-Peripheral  Preliminary Report (27 Jan 2023 03:01):    No growth to date.

## 2023-01-27 NOTE — PROGRESS NOTE ADULT - ASSESSMENT
51 yo female with a PMHx of traumatic subdural hemorrhage, dementia, HTN,  who was BIMBEMS from [facility] for failure to thrive.     Overall fever, tachycardia, sepsis, ? source.  resolved fever today.    CXR with no pneumonia  U/A negative         PLAN:   restarted zosyn,   f/u blood cx, NTD   trend cbc, for leucocytosis, none currently   sacral decubitus with underlying chronic OM, does not appear infected at this time.   c/w aggressive wound care, Frequent turning and positioning, Glycemic control, Optimize nutrition.   pending repeat CT chest/abd/pelvis to look for source.         Radha Ramirez  Please contact through MS Teams   If no response or past 5 pm/weekend call [phone].

## 2023-01-27 NOTE — PROGRESS NOTE ADULT - SUBJECTIVE AND OBJECTIVE BOX
Patient is a 52y old  Female who presents with a chief complaint of FTT (2023 18:38)    Date of servie : 23 @ 13:46  INTERVAL HPI/OVERNIGHT EVENTS:  T(C): 36.9 (23 @ 06:30), Max: 36.9 (23 @ 20:58)  HR: 100 (23 @ 06:30) (96 - 102)  BP: 128/104 (23 @ 06:30) (128/104 - 145/107)  RR: 19 (23 @ 06:30) (16 - 19)  SpO2: 100% (23 @ 06:30) (97% - 100%)  Wt(kg): --  I&O's Summary    2023 07:01  -  2023 07:00  --------------------------------------------------------  IN: 450 mL / OUT: 400 mL / NET: 50 mL        LABS:                        10.1   7.94  )-----------( 395      ( 2023 06:30 )             32.9         140  |  106  |  8   ----------------------------<  83  4.1   |  25  |  0.27<L>    Ca    9.1      2023 06:30  Phos  2.5       Mg     1.70             Urinalysis Basic - ( 2023 14:55 )    Color: Yellow / Appearance: Clear / S.031 / pH: x  Gluc: x / Ketone: Negative  / Bili: Negative / Urobili: 3 mg/dL   Blood: x / Protein: 30 mg/dL / Nitrite: Negative   Leuk Esterase: Negative / RBC: 26 /HPF / WBC 1 /HPF   Sq Epi: x / Non Sq Epi: 0 /HPF / Bacteria: Negative      CAPILLARY BLOOD GLUCOSE            Urinalysis Basic - ( 2023 14:55 )    Color: Yellow / Appearance: Clear / S.031 / pH: x  Gluc: x / Ketone: Negative  / Bili: Negative / Urobili: 3 mg/dL   Blood: x / Protein: 30 mg/dL / Nitrite: Negative   Leuk Esterase: Negative / RBC: 26 /HPF / WBC 1 /HPF   Sq Epi: x / Non Sq Epi: 0 /HPF / Bacteria: Negative        MEDICATIONS  (STANDING):  amLODIPine   Tablet 5 milliGRAM(s) Oral daily  ascorbic acid 500 milliGRAM(s) Oral daily  chlorhexidine 2% Cloths 1 Application(s) Topical daily  Dakins Solution - 1/4 Strength 1 Application(s) Topical two times a day  ferrous    sulfate 325 milliGRAM(s) Oral daily  levETIRAcetam 500 milliGRAM(s) Oral two times a day  multivitamin 1 Tablet(s) Oral daily  piperacillin/tazobactam IVPB.. 3.375 Gram(s) IV Intermittent every 8 hours  polyethylene glycol 3350 17 Gram(s) Oral two times a day  QUEtiapine 25 milliGRAM(s) Oral three times a day  sodium chloride 0.9%. 1000 milliLiter(s) (75 mL/Hr) IV Continuous <Continuous>  traZODone 150 milliGRAM(s) Oral daily    MEDICATIONS  (PRN):  acetaminophen     Tablet .. 650 milliGRAM(s) Oral every 6 hours PRN Temp greater or equal to 38C (100.4F), Mild Pain (1 - 3), Moderate Pain (4 - 6)  melatonin 3 milliGRAM(s) Oral at bedtime PRN Insomnia          PHYSICAL EXAM:  GENERAL: frail  CHEST/LUNG: Clear to percussion bilaterally; No rales, rhonchi, wheezing, or rubs  HEART: Regular rate and rhythm; No murmurs, rubs, or gallops  ABDOMEN: Soft, Nontender, Nondistended; Bowel sounds present  EXTREMITIES:  edema +    Care Discussed with Consultants/Other Providers [ ] YES  [ ] NO

## 2023-01-27 NOTE — CHART NOTE - NSCHARTNOTESELECT_GEN_ALL_CORE
Nutrition Services
Consult- Pressure Injury stage 2 or >, Follow-up/Nutrition Services
Event Note
Nutrition Follow-up Note/Nutrition Services

## 2023-01-27 NOTE — CHART NOTE - NSCHARTNOTEFT_GEN_A_CORE
Source: Patient [X ] confused, disoriented x 4   Family [ ]     other [ X] electronic chart, RN at bedside    Diet : Diet, Pureed:   Mildly Thick Liquids (MILDTHICKLIQS) (01-13-23 @ 09:45)    Nutrition follow-up, severe malnutrition. 53 y/o female with medical history of traumatic subdural hemorrhage, dementia, HTN,  who was BIMBEMS from [facility] for failure to thrive. Facility states that for the past 3 days the pt has been spitting out her medications and food, and has significantly decreased her PO intake.     S/p swallow MBS assessment on 1/13 -pureed, mildly thick recommended. Met with patient and RN at bedside. Collateral obtained from RN and chart review. Per RN, previously ate better >50% of meals. However, last couple of days have not been eating much. Observed only took bites of some pureed foods from tray at bedside. Untouched Hormel Shake. Per RN, patient enjoys ice-cream and magic cup. She is being provided assistance at all meals. Continue encouragement of po intake and PO supplements between meals discussed.       Weight: 1/14- 38kg    No new weight, RN to obtain new weight and weekly weights for continue weight monitoring and changes.       Pertinent Medications: amLODIPine   Tablet  ascorbic acid  ferrous    sulfate  levETIRAcetam  multivitamin  polyethylene glycol 3350  QUEtiapine  sodium chloride 0.9%.  traZODone    Pertinent Labs:  01-27 Na140 mmol/L Glu 83 mg/dL K+ 4.1 mmol/L Cr  0.27 mg/dL<L> BUN 8 mg/dL 01-27 Phos 2.5 mg/dL      Skin: Stage IV sacrum pressure injury noted.  No edema noted.     Estimated Needs:   [ X] no change since previous assessment  [ ] recalculated:       Previous Nutrition Diagnosis:   [ X] Malnutrition, severe     Nutrition Diagnosis is [X ] ongoing  [ ] resolved [ ] not applicable     Additional Recommendations:     1. Continue current diet order as tolerated.   2. Foodservice department will continue to provide Hormel Vital Shake (520 kcal, 22 g pro)x 2  and magic cup (290 kcal, 9 g pro) x 2  3. Please document % PO intake in nursing flowsheet.   4. Monitor weights, labs, BM's, skin integrity, p.o. intake.   5. RN to obtain new weight and weekly weights.  6. Please Encourage po intake, assist with meals and menu selections, provide alternatives PRN.   7. Honor food and beverage preferences within diet restriction of patient in an effort to maximize level of nutrient intake.

## 2023-01-27 NOTE — PROGRESS NOTE ADULT - ASSESSMENT
53 yo female with a PMHx of traumatic subdural hemorrhage, dementia, HTN,  who was BIMBEMS from [facility] for failure to thrive. Facility states that for the past 3 days the pt has been spitting out her medications and food, and has significantly decreased her PO intake. Per the facility, the pt is A&Ox0 and nonverbal at baseline. She does occasionally follow commands. On examination here pt was not following commands    1 FTT  - poor po inatke  - calorie count   - will follow recs  - diet as per speech and swallow     2 Fever, recurrent   - unclear etiology  - fu cultures  - ID called back     3 Hypernatremia  - monitor BMP  - IVF   - renal fu     4 HTN  - cw home meds  - DASH diet

## 2023-01-28 LAB
BASOPHILS # BLD AUTO: 0.02 K/UL — SIGNIFICANT CHANGE UP (ref 0–0.2)
BASOPHILS NFR BLD AUTO: 0.4 % — SIGNIFICANT CHANGE UP (ref 0–2)
EOSINOPHIL # BLD AUTO: 0.04 K/UL — SIGNIFICANT CHANGE UP (ref 0–0.5)
EOSINOPHIL NFR BLD AUTO: 0.7 % — SIGNIFICANT CHANGE UP (ref 0–6)
HCT VFR BLD CALC: 33.8 % — LOW (ref 34.5–45)
HGB BLD-MCNC: 10.6 G/DL — LOW (ref 11.5–15.5)
IANC: 4.3 K/UL — SIGNIFICANT CHANGE UP (ref 1.8–7.4)
IMM GRANULOCYTES NFR BLD AUTO: 0.4 % — SIGNIFICANT CHANGE UP (ref 0–0.9)
LYMPHOCYTES # BLD AUTO: 0.85 K/UL — LOW (ref 1–3.3)
LYMPHOCYTES # BLD AUTO: 15 % — SIGNIFICANT CHANGE UP (ref 13–44)
MCHC RBC-ENTMCNC: 28.5 PG — SIGNIFICANT CHANGE UP (ref 27–34)
MCHC RBC-ENTMCNC: 31.4 GM/DL — LOW (ref 32–36)
MCV RBC AUTO: 90.9 FL — SIGNIFICANT CHANGE UP (ref 80–100)
MONOCYTES # BLD AUTO: 0.43 K/UL — SIGNIFICANT CHANGE UP (ref 0–0.9)
MONOCYTES NFR BLD AUTO: 7.6 % — SIGNIFICANT CHANGE UP (ref 2–14)
NEUTROPHILS # BLD AUTO: 4.3 K/UL — SIGNIFICANT CHANGE UP (ref 1.8–7.4)
NEUTROPHILS NFR BLD AUTO: 75.9 % — SIGNIFICANT CHANGE UP (ref 43–77)
NRBC # BLD: 0 /100 WBCS — SIGNIFICANT CHANGE UP (ref 0–0)
NRBC # FLD: 0 K/UL — SIGNIFICANT CHANGE UP (ref 0–0)
PHOSPHATE SERPL-MCNC: 2.7 MG/DL — SIGNIFICANT CHANGE UP (ref 2.5–4.5)
PLATELET # BLD AUTO: 423 K/UL — HIGH (ref 150–400)
RBC # BLD: 3.72 M/UL — LOW (ref 3.8–5.2)
RBC # FLD: 14.6 % — HIGH (ref 10.3–14.5)
WBC # BLD: 5.66 K/UL — SIGNIFICANT CHANGE UP (ref 3.8–10.5)
WBC # FLD AUTO: 5.66 K/UL — SIGNIFICANT CHANGE UP (ref 3.8–10.5)

## 2023-01-28 RX ADMIN — PIPERACILLIN AND TAZOBACTAM 25 GRAM(S): 4; .5 INJECTION, POWDER, LYOPHILIZED, FOR SOLUTION INTRAVENOUS at 05:33

## 2023-01-28 RX ADMIN — POLYETHYLENE GLYCOL 3350 17 GRAM(S): 17 POWDER, FOR SOLUTION ORAL at 18:58

## 2023-01-28 RX ADMIN — Medication 1 APPLICATION(S): at 19:01

## 2023-01-28 RX ADMIN — LEVETIRACETAM 500 MILLIGRAM(S): 250 TABLET, FILM COATED ORAL at 05:35

## 2023-01-28 RX ADMIN — PIPERACILLIN AND TAZOBACTAM 25 GRAM(S): 4; .5 INJECTION, POWDER, LYOPHILIZED, FOR SOLUTION INTRAVENOUS at 22:48

## 2023-01-28 RX ADMIN — LEVETIRACETAM 500 MILLIGRAM(S): 250 TABLET, FILM COATED ORAL at 18:59

## 2023-01-28 RX ADMIN — QUETIAPINE FUMARATE 25 MILLIGRAM(S): 200 TABLET, FILM COATED ORAL at 13:18

## 2023-01-28 RX ADMIN — Medication 150 MILLIGRAM(S): at 12:09

## 2023-01-28 RX ADMIN — Medication 1 TABLET(S): at 12:09

## 2023-01-28 RX ADMIN — CHLORHEXIDINE GLUCONATE 1 APPLICATION(S): 213 SOLUTION TOPICAL at 12:17

## 2023-01-28 RX ADMIN — Medication 325 MILLIGRAM(S): at 12:10

## 2023-01-28 RX ADMIN — PIPERACILLIN AND TAZOBACTAM 25 GRAM(S): 4; .5 INJECTION, POWDER, LYOPHILIZED, FOR SOLUTION INTRAVENOUS at 13:17

## 2023-01-28 RX ADMIN — AMLODIPINE BESYLATE 5 MILLIGRAM(S): 2.5 TABLET ORAL at 05:33

## 2023-01-28 RX ADMIN — POLYETHYLENE GLYCOL 3350 17 GRAM(S): 17 POWDER, FOR SOLUTION ORAL at 05:34

## 2023-01-28 RX ADMIN — QUETIAPINE FUMARATE 25 MILLIGRAM(S): 200 TABLET, FILM COATED ORAL at 05:34

## 2023-01-28 RX ADMIN — Medication 1 APPLICATION(S): at 05:34

## 2023-01-28 RX ADMIN — Medication 500 MILLIGRAM(S): at 12:10

## 2023-01-28 RX ADMIN — QUETIAPINE FUMARATE 25 MILLIGRAM(S): 200 TABLET, FILM COATED ORAL at 22:49

## 2023-01-28 NOTE — PROGRESS NOTE ADULT - SUBJECTIVE AND OBJECTIVE BOX
Patient is a 52y old  Female who presents with a chief complaint of FTT (2023 18:44)    Date of servie : 23 @ 12:57  INTERVAL HPI/OVERNIGHT EVENTS:  T(C): 36.4 (23 @ 12:35), Max: 36.6 (23 @ 05:36)  HR: 93 (23 @ 12:35) (77 - 93)  BP: 133/95 (23 @ 12:35) (130/103 - 137/91)  RR: 18 (23 @ 12:35) (18 - 18)  SpO2: 99% (23 @ 12:35) (99% - 100%)  Wt(kg): --  I&O's Summary      LABS:                        10.6   5.66  )-----------( 423      ( 2023 07:30 )             33.8         140  |  106  |  8   ----------------------------<  83  4.1   |  25  |  0.27<L>    Ca    9.1      2023 06:30  Phos  TNP       Mg     1.70             Urinalysis Basic - ( 2023 14:55 )    Color: Yellow / Appearance: Clear / S.031 / pH: x  Gluc: x / Ketone: Negative  / Bili: Negative / Urobili: 3 mg/dL   Blood: x / Protein: 30 mg/dL / Nitrite: Negative   Leuk Esterase: Negative / RBC: 26 /HPF / WBC 1 /HPF   Sq Epi: x / Non Sq Epi: 0 /HPF / Bacteria: Negative      CAPILLARY BLOOD GLUCOSE            Urinalysis Basic - ( 2023 14:55 )    Color: Yellow / Appearance: Clear / S.031 / pH: x  Gluc: x / Ketone: Negative  / Bili: Negative / Urobili: 3 mg/dL   Blood: x / Protein: 30 mg/dL / Nitrite: Negative   Leuk Esterase: Negative / RBC: 26 /HPF / WBC 1 /HPF   Sq Epi: x / Non Sq Epi: 0 /HPF / Bacteria: Negative        MEDICATIONS  (STANDING):  amLODIPine   Tablet 5 milliGRAM(s) Oral daily  ascorbic acid 500 milliGRAM(s) Oral daily  chlorhexidine 2% Cloths 1 Application(s) Topical daily  Dakins Solution - 1/4 Strength 1 Application(s) Topical two times a day  ferrous    sulfate 325 milliGRAM(s) Oral daily  levETIRAcetam 500 milliGRAM(s) Oral two times a day  multivitamin 1 Tablet(s) Oral daily  piperacillin/tazobactam IVPB.. 3.375 Gram(s) IV Intermittent every 8 hours  polyethylene glycol 3350 17 Gram(s) Oral two times a day  QUEtiapine 25 milliGRAM(s) Oral three times a day  sodium chloride 0.9%. 1000 milliLiter(s) (75 mL/Hr) IV Continuous <Continuous>  traZODone 150 milliGRAM(s) Oral daily    MEDICATIONS  (PRN):  acetaminophen     Tablet .. 650 milliGRAM(s) Oral every 6 hours PRN Temp greater or equal to 38C (100.4F), Mild Pain (1 - 3), Moderate Pain (4 - 6)  melatonin 3 milliGRAM(s) Oral at bedtime PRN Insomnia          PHYSICAL EXAM:  GENERAL: NAD, well-groomed, well-developed  HEAD:  Atraumatic, Normocephalic  CHEST/LUNG: Clear to percussion bilaterally; No rales, rhonchi, wheezing, or rubs  HEART: Regular rate and rhythm; No murmurs, rubs, or gallops  ABDOMEN: Soft, Nontender, Nondistended; Bowel sounds present  EXTREMITIES:  2+ Peripheral Pulses, No clubbing, cyanosis, or edema  LYMPH: No lymphadenopathy noted  SKIN: No rashes or lesions    Care Discussed with Consultants/Other Providers [ ] YES  [ ] NO

## 2023-01-28 NOTE — PROGRESS NOTE ADULT - ASSESSMENT
53 yo female with a PMHx of traumatic subdural hemorrhage, dementia, HTN,  who was BIMBEMS from [facility] for failure to thrive. Facility states that for the past 3 days the pt has been spitting out her medications and food, and has significantly decreased her PO intake. Per the facility, the pt is A&Ox0 and nonverbal at baseline. She does occasionally follow commands. On examination here pt was not following commands    1 FTT  - poor po inatke  - calorie count   - will follow recs  - diet as per speech and swallow     2 Fever, recurrent   - unclear etiology- fu cultures  - ID called back     3 Hypernatremia  - monitor BMP  - IVF   - renal fu     4 HTN  - cw home meds  - DASH diet

## 2023-01-29 LAB
ANION GAP SERPL CALC-SCNC: 9 MMOL/L — SIGNIFICANT CHANGE UP (ref 7–14)
BASOPHILS # BLD AUTO: 0.01 K/UL — SIGNIFICANT CHANGE UP (ref 0–0.2)
BASOPHILS NFR BLD AUTO: 0.2 % — SIGNIFICANT CHANGE UP (ref 0–2)
BUN SERPL-MCNC: 8 MG/DL — SIGNIFICANT CHANGE UP (ref 7–23)
CALCIUM SERPL-MCNC: 8.8 MG/DL — SIGNIFICANT CHANGE UP (ref 8.4–10.5)
CHLORIDE SERPL-SCNC: 100 MMOL/L — SIGNIFICANT CHANGE UP (ref 98–107)
CO2 SERPL-SCNC: 27 MMOL/L — SIGNIFICANT CHANGE UP (ref 22–31)
CREAT SERPL-MCNC: 0.25 MG/DL — LOW (ref 0.5–1.3)
EGFR: 132 ML/MIN/1.73M2 — SIGNIFICANT CHANGE UP
EOSINOPHIL # BLD AUTO: 0.03 K/UL — SIGNIFICANT CHANGE UP (ref 0–0.5)
EOSINOPHIL NFR BLD AUTO: 0.7 % — SIGNIFICANT CHANGE UP (ref 0–6)
GLUCOSE SERPL-MCNC: 70 MG/DL — SIGNIFICANT CHANGE UP (ref 70–99)
HCT VFR BLD CALC: 34.7 % — SIGNIFICANT CHANGE UP (ref 34.5–45)
HGB BLD-MCNC: 11 G/DL — LOW (ref 11.5–15.5)
IANC: 3.23 K/UL — SIGNIFICANT CHANGE UP (ref 1.8–7.4)
IMM GRANULOCYTES NFR BLD AUTO: 0.7 % — SIGNIFICANT CHANGE UP (ref 0–0.9)
LYMPHOCYTES # BLD AUTO: 0.86 K/UL — LOW (ref 1–3.3)
LYMPHOCYTES # BLD AUTO: 18.7 % — SIGNIFICANT CHANGE UP (ref 13–44)
MAGNESIUM SERPL-MCNC: 1.7 MG/DL — SIGNIFICANT CHANGE UP (ref 1.6–2.6)
MCHC RBC-ENTMCNC: 28.1 PG — SIGNIFICANT CHANGE UP (ref 27–34)
MCHC RBC-ENTMCNC: 31.7 GM/DL — LOW (ref 32–36)
MCV RBC AUTO: 88.7 FL — SIGNIFICANT CHANGE UP (ref 80–100)
MONOCYTES # BLD AUTO: 0.43 K/UL — SIGNIFICANT CHANGE UP (ref 0–0.9)
MONOCYTES NFR BLD AUTO: 9.4 % — SIGNIFICANT CHANGE UP (ref 2–14)
NEUTROPHILS # BLD AUTO: 3.23 K/UL — SIGNIFICANT CHANGE UP (ref 1.8–7.4)
NEUTROPHILS NFR BLD AUTO: 70.3 % — SIGNIFICANT CHANGE UP (ref 43–77)
NRBC # BLD: 0 /100 WBCS — SIGNIFICANT CHANGE UP (ref 0–0)
NRBC # FLD: 0 K/UL — SIGNIFICANT CHANGE UP (ref 0–0)
PHOSPHATE SERPL-MCNC: 2.6 MG/DL — SIGNIFICANT CHANGE UP (ref 2.5–4.5)
PLATELET # BLD AUTO: 466 K/UL — HIGH (ref 150–400)
POTASSIUM SERPL-MCNC: 3.7 MMOL/L — SIGNIFICANT CHANGE UP (ref 3.5–5.3)
POTASSIUM SERPL-SCNC: 3.7 MMOL/L — SIGNIFICANT CHANGE UP (ref 3.5–5.3)
RBC # BLD: 3.91 M/UL — SIGNIFICANT CHANGE UP (ref 3.8–5.2)
RBC # FLD: 14.4 % — SIGNIFICANT CHANGE UP (ref 10.3–14.5)
SODIUM SERPL-SCNC: 136 MMOL/L — SIGNIFICANT CHANGE UP (ref 135–145)
WBC # BLD: 4.59 K/UL — SIGNIFICANT CHANGE UP (ref 3.8–10.5)
WBC # FLD AUTO: 4.59 K/UL — SIGNIFICANT CHANGE UP (ref 3.8–10.5)

## 2023-01-29 RX ADMIN — LEVETIRACETAM 500 MILLIGRAM(S): 250 TABLET, FILM COATED ORAL at 18:18

## 2023-01-29 RX ADMIN — POLYETHYLENE GLYCOL 3350 17 GRAM(S): 17 POWDER, FOR SOLUTION ORAL at 05:30

## 2023-01-29 RX ADMIN — PIPERACILLIN AND TAZOBACTAM 25 GRAM(S): 4; .5 INJECTION, POWDER, LYOPHILIZED, FOR SOLUTION INTRAVENOUS at 05:31

## 2023-01-29 RX ADMIN — QUETIAPINE FUMARATE 25 MILLIGRAM(S): 200 TABLET, FILM COATED ORAL at 22:13

## 2023-01-29 RX ADMIN — Medication 1 APPLICATION(S): at 18:19

## 2023-01-29 RX ADMIN — Medication 650 MILLIGRAM(S): at 11:14

## 2023-01-29 RX ADMIN — Medication 650 MILLIGRAM(S): at 11:44

## 2023-01-29 RX ADMIN — Medication 150 MILLIGRAM(S): at 11:17

## 2023-01-29 RX ADMIN — Medication 325 MILLIGRAM(S): at 11:17

## 2023-01-29 RX ADMIN — Medication 3 MILLIGRAM(S): at 22:14

## 2023-01-29 RX ADMIN — PIPERACILLIN AND TAZOBACTAM 25 GRAM(S): 4; .5 INJECTION, POWDER, LYOPHILIZED, FOR SOLUTION INTRAVENOUS at 22:13

## 2023-01-29 RX ADMIN — QUETIAPINE FUMARATE 25 MILLIGRAM(S): 200 TABLET, FILM COATED ORAL at 13:54

## 2023-01-29 RX ADMIN — Medication 1 TABLET(S): at 11:17

## 2023-01-29 RX ADMIN — CHLORHEXIDINE GLUCONATE 1 APPLICATION(S): 213 SOLUTION TOPICAL at 11:16

## 2023-01-29 RX ADMIN — PIPERACILLIN AND TAZOBACTAM 25 GRAM(S): 4; .5 INJECTION, POWDER, LYOPHILIZED, FOR SOLUTION INTRAVENOUS at 13:54

## 2023-01-29 RX ADMIN — QUETIAPINE FUMARATE 25 MILLIGRAM(S): 200 TABLET, FILM COATED ORAL at 05:31

## 2023-01-29 RX ADMIN — Medication 500 MILLIGRAM(S): at 11:18

## 2023-01-29 RX ADMIN — LEVETIRACETAM 500 MILLIGRAM(S): 250 TABLET, FILM COATED ORAL at 05:31

## 2023-01-29 RX ADMIN — Medication 1 APPLICATION(S): at 05:30

## 2023-01-29 RX ADMIN — POLYETHYLENE GLYCOL 3350 17 GRAM(S): 17 POWDER, FOR SOLUTION ORAL at 18:18

## 2023-01-29 RX ADMIN — AMLODIPINE BESYLATE 5 MILLIGRAM(S): 2.5 TABLET ORAL at 05:31

## 2023-01-29 NOTE — PROGRESS NOTE ADULT - ASSESSMENT
nursing home for failure to thrive. Facility states that for the past 3 days the pt has been spitting out her medications and food, and has significantly decreased her PO intake. Per the facility, the pt is A&Ox0 and nonverbal at baseline. She does occasionally follow commands. On examination here pt was not following commands    1 FTT  - poor po inatke  - calorie count   - will follow recs  - diet as per speech and swallow     2 Fever, recurrent   - fu cultures  - ID fu  - cw zosyn  - check CT chest, abdomen and pelvis     3 Hypernatremia  - monitor BMP  - IVF   - renal fu     4 HTN  - cw home meds  - DASH diet

## 2023-01-30 LAB
ANION GAP SERPL CALC-SCNC: 8 MMOL/L — SIGNIFICANT CHANGE UP (ref 7–14)
BASOPHILS # BLD AUTO: 0.02 K/UL — SIGNIFICANT CHANGE UP (ref 0–0.2)
BASOPHILS NFR BLD AUTO: 0.3 % — SIGNIFICANT CHANGE UP (ref 0–2)
BUN SERPL-MCNC: 6 MG/DL — LOW (ref 7–23)
CALCIUM SERPL-MCNC: 8.6 MG/DL — SIGNIFICANT CHANGE UP (ref 8.4–10.5)
CHLORIDE SERPL-SCNC: 100 MMOL/L — SIGNIFICANT CHANGE UP (ref 98–107)
CO2 SERPL-SCNC: 23 MMOL/L — SIGNIFICANT CHANGE UP (ref 22–31)
CREAT SERPL-MCNC: 0.23 MG/DL — LOW (ref 0.5–1.3)
EGFR: 135 ML/MIN/1.73M2 — SIGNIFICANT CHANGE UP
EOSINOPHIL # BLD AUTO: 0.01 K/UL — SIGNIFICANT CHANGE UP (ref 0–0.5)
EOSINOPHIL NFR BLD AUTO: 0.1 % — SIGNIFICANT CHANGE UP (ref 0–6)
GLUCOSE SERPL-MCNC: 103 MG/DL — HIGH (ref 70–99)
HCT VFR BLD CALC: 33.7 % — LOW (ref 34.5–45)
HGB BLD-MCNC: 11 G/DL — LOW (ref 11.5–15.5)
IANC: 5.59 K/UL — SIGNIFICANT CHANGE UP (ref 1.8–7.4)
IMM GRANULOCYTES NFR BLD AUTO: 0.7 % — SIGNIFICANT CHANGE UP (ref 0–0.9)
LYMPHOCYTES # BLD AUTO: 0.82 K/UL — LOW (ref 1–3.3)
LYMPHOCYTES # BLD AUTO: 11.6 % — LOW (ref 13–44)
MAGNESIUM SERPL-MCNC: 1.5 MG/DL — LOW (ref 1.6–2.6)
MCHC RBC-ENTMCNC: 28.7 PG — SIGNIFICANT CHANGE UP (ref 27–34)
MCHC RBC-ENTMCNC: 32.6 GM/DL — SIGNIFICANT CHANGE UP (ref 32–36)
MCV RBC AUTO: 88 FL — SIGNIFICANT CHANGE UP (ref 80–100)
MONOCYTES # BLD AUTO: 0.55 K/UL — SIGNIFICANT CHANGE UP (ref 0–0.9)
MONOCYTES NFR BLD AUTO: 7.8 % — SIGNIFICANT CHANGE UP (ref 2–14)
NEUTROPHILS # BLD AUTO: 5.59 K/UL — SIGNIFICANT CHANGE UP (ref 1.8–7.4)
NEUTROPHILS NFR BLD AUTO: 79.5 % — HIGH (ref 43–77)
NRBC # BLD: 0 /100 WBCS — SIGNIFICANT CHANGE UP (ref 0–0)
NRBC # FLD: 0 K/UL — SIGNIFICANT CHANGE UP (ref 0–0)
PHOSPHATE SERPL-MCNC: 1.9 MG/DL — LOW (ref 2.5–4.5)
PLATELET # BLD AUTO: 294 K/UL — SIGNIFICANT CHANGE UP (ref 150–400)
POTASSIUM SERPL-MCNC: 3.6 MMOL/L — SIGNIFICANT CHANGE UP (ref 3.5–5.3)
POTASSIUM SERPL-SCNC: 3.6 MMOL/L — SIGNIFICANT CHANGE UP (ref 3.5–5.3)
RBC # BLD: 3.83 M/UL — SIGNIFICANT CHANGE UP (ref 3.8–5.2)
RBC # FLD: 14.3 % — SIGNIFICANT CHANGE UP (ref 10.3–14.5)
SODIUM SERPL-SCNC: 131 MMOL/L — LOW (ref 135–145)
WBC # BLD: 7.04 K/UL — SIGNIFICANT CHANGE UP (ref 3.8–10.5)
WBC # FLD AUTO: 7.04 K/UL — SIGNIFICANT CHANGE UP (ref 3.8–10.5)

## 2023-01-30 PROCEDURE — 71260 CT THORAX DX C+: CPT | Mod: 26

## 2023-01-30 PROCEDURE — 74177 CT ABD & PELVIS W/CONTRAST: CPT | Mod: 26

## 2023-01-30 RX ADMIN — Medication 1 TABLET(S): at 12:31

## 2023-01-30 RX ADMIN — Medication 325 MILLIGRAM(S): at 12:30

## 2023-01-30 RX ADMIN — PIPERACILLIN AND TAZOBACTAM 25 GRAM(S): 4; .5 INJECTION, POWDER, LYOPHILIZED, FOR SOLUTION INTRAVENOUS at 13:57

## 2023-01-30 RX ADMIN — Medication 1 APPLICATION(S): at 18:48

## 2023-01-30 RX ADMIN — Medication 650 MILLIGRAM(S): at 06:30

## 2023-01-30 RX ADMIN — PIPERACILLIN AND TAZOBACTAM 25 GRAM(S): 4; .5 INJECTION, POWDER, LYOPHILIZED, FOR SOLUTION INTRAVENOUS at 06:34

## 2023-01-30 RX ADMIN — Medication 3 MILLIGRAM(S): at 22:50

## 2023-01-30 RX ADMIN — QUETIAPINE FUMARATE 25 MILLIGRAM(S): 200 TABLET, FILM COATED ORAL at 06:31

## 2023-01-30 RX ADMIN — LEVETIRACETAM 500 MILLIGRAM(S): 250 TABLET, FILM COATED ORAL at 06:30

## 2023-01-30 RX ADMIN — AMLODIPINE BESYLATE 5 MILLIGRAM(S): 2.5 TABLET ORAL at 06:30

## 2023-01-30 RX ADMIN — LEVETIRACETAM 500 MILLIGRAM(S): 250 TABLET, FILM COATED ORAL at 18:50

## 2023-01-30 RX ADMIN — Medication 650 MILLIGRAM(S): at 07:30

## 2023-01-30 RX ADMIN — CHLORHEXIDINE GLUCONATE 1 APPLICATION(S): 213 SOLUTION TOPICAL at 12:31

## 2023-01-30 RX ADMIN — QUETIAPINE FUMARATE 25 MILLIGRAM(S): 200 TABLET, FILM COATED ORAL at 22:50

## 2023-01-30 RX ADMIN — PIPERACILLIN AND TAZOBACTAM 25 GRAM(S): 4; .5 INJECTION, POWDER, LYOPHILIZED, FOR SOLUTION INTRAVENOUS at 22:52

## 2023-01-30 RX ADMIN — Medication 1 APPLICATION(S): at 06:31

## 2023-01-30 RX ADMIN — Medication 500 MILLIGRAM(S): at 12:30

## 2023-01-30 RX ADMIN — QUETIAPINE FUMARATE 25 MILLIGRAM(S): 200 TABLET, FILM COATED ORAL at 13:31

## 2023-01-30 RX ADMIN — Medication 150 MILLIGRAM(S): at 12:30

## 2023-01-30 RX ADMIN — POLYETHYLENE GLYCOL 3350 17 GRAM(S): 17 POWDER, FOR SOLUTION ORAL at 18:52

## 2023-01-30 NOTE — PROGRESS NOTE ADULT - SUBJECTIVE AND OBJECTIVE BOX
Patient is a 52y old  Female who presents with a chief complaint of FTT (29 Jan 2023 11:31)    Date of servie : 01-30-23 @ 12:31  INTERVAL HPI/OVERNIGHT EVENTS:  T(C): 36.8 (01-30-23 @ 06:30), Max: 36.8 (01-29-23 @ 21:55)  HR: 106 (01-30-23 @ 06:30) (103 - 106)  BP: 156/103 (01-30-23 @ 06:30) (131/92 - 156/103)  RR: 17 (01-30-23 @ 06:30) (17 - 18)  SpO2: 100% (01-30-23 @ 06:30) (100% - 100%)  Wt(kg): --  I&O's Summary      LABS:                        11.0   7.04  )-----------( 294      ( 30 Jan 2023 07:02 )             33.7     01-30    131<L>  |  100  |  6<L>  ----------------------------<  103<H>  3.6   |  23  |  0.23<L>    Ca    8.6      30 Jan 2023 07:02  Phos  1.9     01-30  Mg     1.50     01-30          CAPILLARY BLOOD GLUCOSE                MEDICATIONS  (STANDING):  amLODIPine   Tablet 5 milliGRAM(s) Oral daily  ascorbic acid 500 milliGRAM(s) Oral daily  chlorhexidine 2% Cloths 1 Application(s) Topical daily  Dakins Solution - 1/4 Strength 1 Application(s) Topical two times a day  ferrous    sulfate 325 milliGRAM(s) Oral daily  levETIRAcetam 500 milliGRAM(s) Oral two times a day  multivitamin 1 Tablet(s) Oral daily  piperacillin/tazobactam IVPB.. 3.375 Gram(s) IV Intermittent every 8 hours  polyethylene glycol 3350 17 Gram(s) Oral two times a day  QUEtiapine 25 milliGRAM(s) Oral three times a day  sodium chloride 0.9%. 1000 milliLiter(s) (75 mL/Hr) IV Continuous <Continuous>  traZODone 150 milliGRAM(s) Oral daily    MEDICATIONS  (PRN):  acetaminophen     Tablet .. 650 milliGRAM(s) Oral every 6 hours PRN Temp greater or equal to 38C (100.4F), Mild Pain (1 - 3), Moderate Pain (4 - 6)  melatonin 3 milliGRAM(s) Oral at bedtime PRN Insomnia          PHYSICAL EXAM:  GENERAL: frail  CHEST/LUNG: Clear to percussion bilaterally; No rales, rhonchi, wheezing, or rubs  HEART: Regular rate and rhythm; No murmurs, rubs, or gallops  ABDOMEN: Soft, Nontender, Nondistended; Bowel sounds present  EXTREMITIES: edema +    Care Discussed with Consultants/Other Providers [ ] YES  [ ] NO

## 2023-01-30 NOTE — PROGRESS NOTE ADULT - ASSESSMENT
1 FTT  - poor po inatke  - calorie count   - will follow recs  - diet as per speech and swallow     2 Fever, recurrent   - fu cultures  - ID fu  - cw zosyn  -  CT chest, abdomen and pelvis reviewed    3 Hypernatremia  - monitor BMP  - IVF   - renal fu     4 HTN  - cw home meds  - DASH diet

## 2023-01-31 LAB
ANION GAP SERPL CALC-SCNC: 11 MMOL/L — SIGNIFICANT CHANGE UP (ref 7–14)
BASOPHILS # BLD AUTO: 0.03 K/UL — SIGNIFICANT CHANGE UP (ref 0–0.2)
BASOPHILS NFR BLD AUTO: 0.5 % — SIGNIFICANT CHANGE UP (ref 0–2)
BUN SERPL-MCNC: 7 MG/DL — SIGNIFICANT CHANGE UP (ref 7–23)
CALCIUM SERPL-MCNC: 9.2 MG/DL — SIGNIFICANT CHANGE UP (ref 8.4–10.5)
CHLORIDE SERPL-SCNC: 99 MMOL/L — SIGNIFICANT CHANGE UP (ref 98–107)
CO2 SERPL-SCNC: 24 MMOL/L — SIGNIFICANT CHANGE UP (ref 22–31)
CREAT SERPL-MCNC: 0.24 MG/DL — LOW (ref 0.5–1.3)
CULTURE RESULTS: SIGNIFICANT CHANGE UP
CULTURE RESULTS: SIGNIFICANT CHANGE UP
EGFR: 134 ML/MIN/1.73M2 — SIGNIFICANT CHANGE UP
EOSINOPHIL # BLD AUTO: 0.01 K/UL — SIGNIFICANT CHANGE UP (ref 0–0.5)
EOSINOPHIL NFR BLD AUTO: 0.2 % — SIGNIFICANT CHANGE UP (ref 0–6)
GLUCOSE SERPL-MCNC: 88 MG/DL — SIGNIFICANT CHANGE UP (ref 70–99)
HCT VFR BLD CALC: 37.2 % — SIGNIFICANT CHANGE UP (ref 34.5–45)
HGB BLD-MCNC: 11.8 G/DL — SIGNIFICANT CHANGE UP (ref 11.5–15.5)
IANC: 4.79 K/UL — SIGNIFICANT CHANGE UP (ref 1.8–7.4)
IMM GRANULOCYTES NFR BLD AUTO: 0.5 % — SIGNIFICANT CHANGE UP (ref 0–0.9)
LYMPHOCYTES # BLD AUTO: 0.94 K/UL — LOW (ref 1–3.3)
LYMPHOCYTES # BLD AUTO: 14.6 % — SIGNIFICANT CHANGE UP (ref 13–44)
MAGNESIUM SERPL-MCNC: 1.7 MG/DL — SIGNIFICANT CHANGE UP (ref 1.6–2.6)
MCHC RBC-ENTMCNC: 28 PG — SIGNIFICANT CHANGE UP (ref 27–34)
MCHC RBC-ENTMCNC: 31.7 GM/DL — LOW (ref 32–36)
MCV RBC AUTO: 88.4 FL — SIGNIFICANT CHANGE UP (ref 80–100)
MONOCYTES # BLD AUTO: 0.64 K/UL — SIGNIFICANT CHANGE UP (ref 0–0.9)
MONOCYTES NFR BLD AUTO: 9.9 % — SIGNIFICANT CHANGE UP (ref 2–14)
NEUTROPHILS # BLD AUTO: 4.79 K/UL — SIGNIFICANT CHANGE UP (ref 1.8–7.4)
NEUTROPHILS NFR BLD AUTO: 74.3 % — SIGNIFICANT CHANGE UP (ref 43–77)
NRBC # BLD: 0 /100 WBCS — SIGNIFICANT CHANGE UP (ref 0–0)
NRBC # FLD: 0 K/UL — SIGNIFICANT CHANGE UP (ref 0–0)
PHOSPHATE SERPL-MCNC: 2.3 MG/DL — LOW (ref 2.5–4.5)
PLATELET # BLD AUTO: 683 K/UL — HIGH (ref 150–400)
POTASSIUM SERPL-MCNC: 4 MMOL/L — SIGNIFICANT CHANGE UP (ref 3.5–5.3)
POTASSIUM SERPL-SCNC: 4 MMOL/L — SIGNIFICANT CHANGE UP (ref 3.5–5.3)
RBC # BLD: 4.21 M/UL — SIGNIFICANT CHANGE UP (ref 3.8–5.2)
RBC # FLD: 14.5 % — SIGNIFICANT CHANGE UP (ref 10.3–14.5)
SODIUM SERPL-SCNC: 134 MMOL/L — LOW (ref 135–145)
SPECIMEN SOURCE: SIGNIFICANT CHANGE UP
SPECIMEN SOURCE: SIGNIFICANT CHANGE UP
WBC # BLD: 6.44 K/UL — SIGNIFICANT CHANGE UP (ref 3.8–10.5)
WBC # FLD AUTO: 6.44 K/UL — SIGNIFICANT CHANGE UP (ref 3.8–10.5)

## 2023-01-31 PROCEDURE — 99232 SBSQ HOSP IP/OBS MODERATE 35: CPT

## 2023-01-31 RX ADMIN — Medication 1 APPLICATION(S): at 18:28

## 2023-01-31 RX ADMIN — PIPERACILLIN AND TAZOBACTAM 25 GRAM(S): 4; .5 INJECTION, POWDER, LYOPHILIZED, FOR SOLUTION INTRAVENOUS at 14:54

## 2023-01-31 RX ADMIN — POLYETHYLENE GLYCOL 3350 17 GRAM(S): 17 POWDER, FOR SOLUTION ORAL at 05:25

## 2023-01-31 RX ADMIN — POLYETHYLENE GLYCOL 3350 17 GRAM(S): 17 POWDER, FOR SOLUTION ORAL at 18:32

## 2023-01-31 RX ADMIN — Medication 150 MILLIGRAM(S): at 12:07

## 2023-01-31 RX ADMIN — LEVETIRACETAM 500 MILLIGRAM(S): 250 TABLET, FILM COATED ORAL at 05:25

## 2023-01-31 RX ADMIN — QUETIAPINE FUMARATE 25 MILLIGRAM(S): 200 TABLET, FILM COATED ORAL at 22:03

## 2023-01-31 RX ADMIN — LEVETIRACETAM 500 MILLIGRAM(S): 250 TABLET, FILM COATED ORAL at 18:30

## 2023-01-31 RX ADMIN — Medication 500 MILLIGRAM(S): at 12:08

## 2023-01-31 RX ADMIN — CHLORHEXIDINE GLUCONATE 1 APPLICATION(S): 213 SOLUTION TOPICAL at 12:08

## 2023-01-31 RX ADMIN — QUETIAPINE FUMARATE 25 MILLIGRAM(S): 200 TABLET, FILM COATED ORAL at 05:25

## 2023-01-31 RX ADMIN — PIPERACILLIN AND TAZOBACTAM 25 GRAM(S): 4; .5 INJECTION, POWDER, LYOPHILIZED, FOR SOLUTION INTRAVENOUS at 05:21

## 2023-01-31 RX ADMIN — Medication 1 APPLICATION(S): at 05:25

## 2023-01-31 RX ADMIN — Medication 325 MILLIGRAM(S): at 12:08

## 2023-01-31 RX ADMIN — PIPERACILLIN AND TAZOBACTAM 25 GRAM(S): 4; .5 INJECTION, POWDER, LYOPHILIZED, FOR SOLUTION INTRAVENOUS at 22:03

## 2023-01-31 RX ADMIN — Medication 1 TABLET(S): at 12:07

## 2023-01-31 RX ADMIN — AMLODIPINE BESYLATE 5 MILLIGRAM(S): 2.5 TABLET ORAL at 05:25

## 2023-01-31 RX ADMIN — QUETIAPINE FUMARATE 25 MILLIGRAM(S): 200 TABLET, FILM COATED ORAL at 14:55

## 2023-01-31 NOTE — PROGRESS NOTE ADULT - SUBJECTIVE AND OBJECTIVE BOX
52yPatient is a 52y old  Female who presents with a chief complaint of FTT (31 Jan 2023 14:22)      Interval history:  Afebrile, being fed. No diarrhea.       Allergies:   No Known Allergies      Antimicrobials:  piperacillin/tazobactam IVPB.. 3.375 Gram(s) IV Intermittent every 8 hours      REVIEW OF SYSTEMS:  Unable to obtain due to pt's underlying mental status.       Vital Signs Last 24 Hrs  T(C): 36.7 (01-31-23 @ 15:00), Max: 36.9 (01-31-23 @ 05:15)  T(F): 98 (01-31-23 @ 15:00), Max: 98.4 (01-31-23 @ 05:15)  HR: 113 (01-31-23 @ 15:00) (107 - 113)  BP: 131/87 (01-31-23 @ 15:00) (118/88 - 147/99)  BP(mean): --  RR: 18 (01-31-23 @ 15:00) (18 - 18)  SpO2: 100% (01-31-23 @ 15:00) (100% - 100%)      PHYSICAL EXAM:  Pt in no acute distress, awake. not verbal.   + air entry b/l.   non distended abdomen  no edema LE   no phlebitis   decubitus ulcer large but appears clean.                             11.8   6.44  )-----------( 683      ( 31 Jan 2023 06:10 )             37.2   01-31    134<L>  |  99  |  7   ----------------------------<  88  4.0   |  24  |  0.24<L>    Ca    9.2      31 Jan 2023 06:10  Phos  2.3     01-31  Mg     1.70     01-31        Radiology:    < from: CT Chest w/ IV Cont (01.30.23 @ 10:15) >  IMPRESSION:  Chronic changes of osteomyelitis in the sacrum underlying large sacral   decubitus ulcer. No evidence of drainable fluid collection.    Clustered nodular opacities in the left lower lobe, improved compared to   the prior examination, consistent with infectious/inflammatory etiology.    Previously noted right renal calculus is now in the renal pelvis. No   hydronephrosis.

## 2023-01-31 NOTE — PROGRESS NOTE ADULT - ASSESSMENT
53 yo female with a PMHx of traumatic subdural hemorrhage, dementia, HTN,  who was BIMBEMS from [facility] for failure to thrive.     Overall fever, tachycardia, sepsis, ? source.  resolved fever today.    CXR with no pneumonia  U/A negative         PLAN:   restarted zosyn,   day 6/7 of therapy today.   f/u blood cx, NTD   trend cbc, for leucocytosis, none currently   sacral decubitus with underlying chronic OM, does not appear infected at this time.   c/w aggressive wound care, Frequent turning and positioning, Glycemic control, Optimize nutrition.   CT chest/abd/pelvis with possible lt sided lower lobe ? pneumonia     Plan discussed with Medicine PA.     Radha Ramirez  Please contact through MS Teams   If no response or past 5 pm/weekend call [phone].   51 yo female with a PMHx of traumatic subdural hemorrhage, dementia, HTN,  who was BIMBEMS from [facility] for failure to thrive.     Overall fever, tachycardia, sepsis, ? source.  resolved fever today.    CXR with no pneumonia  U/A negative         PLAN:   restarted zosyn,   day 6/7 of therapy today.   f/u blood cx, NTD   trend cbc, for leucocytosis, none currently   sacral decubitus with underlying chronic OM, does not appear infected at this time.   c/w aggressive wound care, Frequent turning and positioning, Glycemic control, Optimize nutrition.   CT chest/abd/pelvis with possible lt sided lower lobe ? pneumonia     Plan discussed with Medicine PA.     Will sign off, please call with questions.     Radha Ramirez  Please contact through MS Teams   If no response or past 5 pm/weekend call [phone].

## 2023-01-31 NOTE — PROGRESS NOTE ADULT - SUBJECTIVE AND OBJECTIVE BOX
Patient is a 52y old  Female who presents with a chief complaint of FTT (30 Jan 2023 12:31)    Date of servie : 01-31-23 @ 14:23  INTERVAL HPI/OVERNIGHT EVENTS:  T(C): 36.9 (01-31-23 @ 05:15), Max: 36.9 (01-30-23 @ 14:53)  HR: 110 (01-31-23 @ 05:15) (107 - 110)  BP: 147/99 (01-31-23 @ 05:15) (118/88 - 147/99)  RR: 18 (01-31-23 @ 05:15) (18 - 18)  SpO2: 100% (01-31-23 @ 05:15) (100% - 100%)  Wt(kg): --  I&O's Summary      LABS:                        11.8   6.44  )-----------( 683      ( 31 Jan 2023 06:10 )             37.2     01-31    134<L>  |  99  |  7   ----------------------------<  88  4.0   |  24  |  0.24<L>    Ca    9.2      31 Jan 2023 06:10  Phos  2.3     01-31  Mg     1.70     01-31          CAPILLARY BLOOD GLUCOSE                MEDICATIONS  (STANDING):  amLODIPine   Tablet 5 milliGRAM(s) Oral daily  ascorbic acid 500 milliGRAM(s) Oral daily  chlorhexidine 2% Cloths 1 Application(s) Topical daily  Dakins Solution - 1/4 Strength 1 Application(s) Topical two times a day  ferrous    sulfate 325 milliGRAM(s) Oral daily  levETIRAcetam 500 milliGRAM(s) Oral two times a day  multivitamin 1 Tablet(s) Oral daily  piperacillin/tazobactam IVPB.. 3.375 Gram(s) IV Intermittent every 8 hours  polyethylene glycol 3350 17 Gram(s) Oral two times a day  QUEtiapine 25 milliGRAM(s) Oral three times a day  sodium chloride 0.9%. 1000 milliLiter(s) (75 mL/Hr) IV Continuous <Continuous>  traZODone 150 milliGRAM(s) Oral daily    MEDICATIONS  (PRN):  acetaminophen     Tablet .. 650 milliGRAM(s) Oral every 6 hours PRN Temp greater or equal to 38C (100.4F), Mild Pain (1 - 3), Moderate Pain (4 - 6)  melatonin 3 milliGRAM(s) Oral at bedtime PRN Insomnia          PHYSICAL EXAM:  GENERAL: frail  HEAD:  Atraumatic, Normocephalic  CHEST/LUNG: Clear to percussion bilaterally; No rales, rhonchi, wheezing, or rubs  HEART: Regular rate and rhythm; No murmurs, rubs, or gallops  ABDOMEN: Soft, Nontender, Nondistended; Bowel sounds present  EXTREMITIES:  edema +    Care Discussed with Consultants/Other Providers [ x] YES  [ ] NO

## 2023-02-01 LAB — SARS-COV-2 RNA SPEC QL NAA+PROBE: SIGNIFICANT CHANGE UP

## 2023-02-01 PROCEDURE — 99223 1ST HOSP IP/OBS HIGH 75: CPT

## 2023-02-01 RX ORDER — COLLAGENASE CLOSTRIDIUM HIST. 250 UNIT/G
1 OINTMENT (GRAM) TOPICAL DAILY
Refills: 0 | Status: DISCONTINUED | OUTPATIENT
Start: 2023-02-01 | End: 2023-02-02

## 2023-02-01 RX ADMIN — QUETIAPINE FUMARATE 25 MILLIGRAM(S): 200 TABLET, FILM COATED ORAL at 13:24

## 2023-02-01 RX ADMIN — CHLORHEXIDINE GLUCONATE 1 APPLICATION(S): 213 SOLUTION TOPICAL at 13:23

## 2023-02-01 RX ADMIN — Medication 325 MILLIGRAM(S): at 13:23

## 2023-02-01 RX ADMIN — Medication 1 APPLICATION(S): at 18:48

## 2023-02-01 RX ADMIN — Medication 500 MILLIGRAM(S): at 13:23

## 2023-02-01 RX ADMIN — LEVETIRACETAM 500 MILLIGRAM(S): 250 TABLET, FILM COATED ORAL at 06:18

## 2023-02-01 RX ADMIN — LEVETIRACETAM 500 MILLIGRAM(S): 250 TABLET, FILM COATED ORAL at 18:48

## 2023-02-01 RX ADMIN — QUETIAPINE FUMARATE 25 MILLIGRAM(S): 200 TABLET, FILM COATED ORAL at 06:18

## 2023-02-01 RX ADMIN — AMLODIPINE BESYLATE 5 MILLIGRAM(S): 2.5 TABLET ORAL at 06:19

## 2023-02-01 RX ADMIN — POLYETHYLENE GLYCOL 3350 17 GRAM(S): 17 POWDER, FOR SOLUTION ORAL at 18:48

## 2023-02-01 RX ADMIN — PIPERACILLIN AND TAZOBACTAM 25 GRAM(S): 4; .5 INJECTION, POWDER, LYOPHILIZED, FOR SOLUTION INTRAVENOUS at 06:19

## 2023-02-01 RX ADMIN — PIPERACILLIN AND TAZOBACTAM 25 GRAM(S): 4; .5 INJECTION, POWDER, LYOPHILIZED, FOR SOLUTION INTRAVENOUS at 22:52

## 2023-02-01 RX ADMIN — Medication 150 MILLIGRAM(S): at 13:23

## 2023-02-01 RX ADMIN — PIPERACILLIN AND TAZOBACTAM 25 GRAM(S): 4; .5 INJECTION, POWDER, LYOPHILIZED, FOR SOLUTION INTRAVENOUS at 13:54

## 2023-02-01 RX ADMIN — Medication 1 TABLET(S): at 13:24

## 2023-02-01 RX ADMIN — QUETIAPINE FUMARATE 25 MILLIGRAM(S): 200 TABLET, FILM COATED ORAL at 22:43

## 2023-02-01 RX ADMIN — Medication 1 APPLICATION(S): at 06:19

## 2023-02-01 NOTE — PROGRESS NOTE ADULT - REASON FOR ADMISSION
FTT

## 2023-02-01 NOTE — PROGRESS NOTE ADULT - SUBJECTIVE AND OBJECTIVE BOX
Patient is a 52y old  Female who presents with a chief complaint of FTT (01 Feb 2023 12:30)    Date of servie : 02-01-23 @ 14:02  INTERVAL HPI/OVERNIGHT EVENTS:  T(C): 36.4 (02-01-23 @ 06:06), Max: 36.7 (01-31-23 @ 15:00)  HR: 102 (02-01-23 @ 06:06) (102 - 113)  BP: 129/99 (02-01-23 @ 06:06) (129/99 - 142/95)  RR: 18 (02-01-23 @ 06:06) (18 - 18)  SpO2: 100% (02-01-23 @ 06:06) (100% - 100%)  Wt(kg): --  I&O's Summary      LABS:                        11.8   6.44  )-----------( 683      ( 31 Jan 2023 06:10 )             37.2     01-31    134<L>  |  99  |  7   ----------------------------<  88  4.0   |  24  |  0.24<L>    Ca    9.2      31 Jan 2023 06:10  Phos  2.3     01-31  Mg     1.70     01-31          CAPILLARY BLOOD GLUCOSE                MEDICATIONS  (STANDING):  amLODIPine   Tablet 5 milliGRAM(s) Oral daily  ascorbic acid 500 milliGRAM(s) Oral daily  chlorhexidine 2% Cloths 1 Application(s) Topical daily  collagenase Ointment 1 Application(s) Topical daily  Dakins Solution - 1/4 Strength 1 Application(s) Topical two times a day  ferrous    sulfate 325 milliGRAM(s) Oral daily  levETIRAcetam 500 milliGRAM(s) Oral two times a day  multivitamin 1 Tablet(s) Oral daily  piperacillin/tazobactam IVPB.. 3.375 Gram(s) IV Intermittent every 8 hours  polyethylene glycol 3350 17 Gram(s) Oral two times a day  QUEtiapine 25 milliGRAM(s) Oral three times a day  sodium chloride 0.9%. 1000 milliLiter(s) (75 mL/Hr) IV Continuous <Continuous>  traZODone 150 milliGRAM(s) Oral daily    MEDICATIONS  (PRN):  acetaminophen     Tablet .. 650 milliGRAM(s) Oral every 6 hours PRN Temp greater or equal to 38C (100.4F), Mild Pain (1 - 3), Moderate Pain (4 - 6)  melatonin 3 milliGRAM(s) Oral at bedtime PRN Insomnia          PHYSICAL EXAM:  GENERAL: frail  CHEST/LUNG: Clear to percussion bilaterally; No rales, rhonchi, wheezing, or rubs  HEART: Regular rate and rhythm; No murmurs, rubs, or gallops  ABDOMEN: Soft, Nontender, Nondistended; Bowel sounds present  EXTREMITIES:  edema +    Care Discussed with Consultants/Other Providers [ ] YES  [ ] NO

## 2023-02-01 NOTE — PROGRESS NOTE ADULT - SUBJECTIVE AND OBJECTIVE BOX
[facility]-- WOUND TEAM -- FOLLOW UP NOTE  --------------------------------------------------------------------------------    subjective: Unable to obtain subjective data, patient nonverbal.  Per staff patient has been eating more, has not been drinking, often spits out liquids.    Interval HPI/24 hour events:   Family declined hospice, plan for patient to be discharged to [facility].  Remains DNR/DNI    Chart reviewed including labs and relevant images      Diet:  Diet, Pureed:   Mildly Thick Liquids (MILDTHICKLIQS) (01-13-23 @ 09:45)      ROS: pt unable to offer.  Non-verbal    ALLERGIES & MEDICATIONS  --------------------------------------------------------------------------------  Allergies    No Known Allergies    Intolerances          STANDING INPATIENT MEDICATIONS    amLODIPine   Tablet 5 milliGRAM(s) Oral daily  ascorbic acid 500 milliGRAM(s) Oral daily  chlorhexidine 2% Cloths 1 Application(s) Topical daily  collagenase Ointment 1 Application(s) Topical daily  Dakins Solution - 1/4 Strength 1 Application(s) Topical two times a day  ferrous    sulfate 325 milliGRAM(s) Oral daily  levETIRAcetam 500 milliGRAM(s) Oral two times a day  multivitamin 1 Tablet(s) Oral daily  piperacillin/tazobactam IVPB.. 3.375 Gram(s) IV Intermittent every 8 hours  polyethylene glycol 3350 17 Gram(s) Oral two times a day  QUEtiapine 25 milliGRAM(s) Oral three times a day  sodium chloride 0.9%. 1000 milliLiter(s) IV Continuous <Continuous>  traZODone 150 milliGRAM(s) Oral daily      PRN INPATIENT MEDICATION  acetaminophen     Tablet .. 650 milliGRAM(s) Oral every 6 hours PRN  melatonin 3 milliGRAM(s) Oral at bedtime PRN        Vital signs:  T(C): 36.4 (02-01-23 @ 06:06), Max: 36.7 (01-31-23 @ 15:00)  HR: 102 (02-01-23 @ 06:06) (102 - 113)  BP: 129/99 (02-01-23 @ 06:06) (129/99 - 142/95)  RR: 18 (02-01-23 @ 06:06) (18 - 18)  SpO2: 100% (02-01-23 @ 06:06) (100% - 100%)  Wt(kg): 38 kg (01-14-23)  BMI: 14.4 kg/m2 (01-14-23)      Constitutional: NAD, eyes open, severely cachetic, not communicating, temporal wasting, protruding bony prominences.  (+) low airloss support surface, (+) fluidized positioning devices, (+) complete cair boots.   HEENT:  NC/AT, eyes open, mucosa dry, poor dentition/missing teeth  Cardiovascular: rate regular to tachycardic  Respiratory: nonlabored, equal chest expansion, room air  Gastrointestinal: soft NT/ND, External hemorrhoid   : incontinent urine during exam, perineal care provided, external female urinary collection device  Neurology: (+) dementia, unable to follow commands, nonverbal  functional quadriplegic   Musculoskeletal:  limited, left hand and digits with rigidity and hyperextension. B/L LE flaccid in extension. Mild foot drop worse to left foot.   Vascular: BLE equally warm, +2 dp pulses, capillary refill < 3 seconds.  Skin:  moist w/ good turgor  Forehead with hypopigmentation healed wound?  Right knee with hyperpigmentation   Right ischium with hypopigmentation- [id number] (prev [id number]). No tissue type changes palpated. Silicone foam with border applied  Sacrum stage 4 pressure injury- patient turned to left side-1xpd1ytr0.8cm; undermining from 7-12 o'clock deepest 2.4cm at 12 o'clock; undermining from 1-5 o'clock now healed. 100% pink-red moist granular base, bone in close proximity, not visible. No purulent drainage, no odor. Periwound skin intact. No induration, no increased warmth, no fluctuance, no induration, no associated cellulitis. No s/s of acute skin infection.  NO drainable abscess. Packed with Aquacel, silicone foam with border applied.      LABS/ CULTURES/ RADIOLOGY:              11.8   6.44  >-----------<  683      [01-31-23 @ 06:10]              37.2     134  |  99  |  7   ----------------------------<  88      [01-31-23 @ 06:10]  4.0   |  24  |  0.24        Ca     9.2     [01-31-23 @ 06:10]      Mg     1.70     [01-31-23 @ 06:10]      Phos  2.3     [01-31-23 @ 06:10]        Culture - Blood (collected 01-25-23 @ 23:50)  Source: .Blood Blood-Venous  Final Report (01-31-23 @ 03:00):    No Growth Final    Culture - Blood (collected 01-25-23 @ 23:50)  Source: .Blood Blood-Peripheral  Final Report (01-31-23 @ 03:00):    No Growth Final          < from: CT Abdomen and Pelvis w/ IV Cont (01.30.23 @ 10:15) >  ACC: [id number] EXAM:  CT ABDOMEN AND PELVIS IC   ORDERED BY: GIL MORALES     ACC: [id number] EXAM:  CT CHEST IC   ORDERED BY: GIL MORALES     PROCEDURE DATE:  01/30/2023          INTERPRETATION:  CLINICAL INFORMATION: Dementia. Fever. Evaluate for   infection.    COMPARISON: 1/7/2023    CONTRAST/COMPLICATIONS:  IV Contrast: Omnipaque 350  80 mL administered   20 mL discarded  Oral Contrast: None  Complications: None reported at time of exam    PROCEDURE:  CT of the Chest, Abdomen and Pelvis was performed.  Sagittal and coronal reformats were performed.    FINDINGS:  CHEST:  LUNGS AND LARGE AIRWAYS: Patent central airways. Clustered nodular   opacities in the left lower lobe, improved compared to the prior   examination.  PLEURA: No pleural effusion.  VESSELS: Atherosclerotic calcifications.  HEART: Heart size is normal. No pericardial effusion.  MEDIASTINUM AND ROBYN: No lymphadenopathy.  CHEST WALL AND LOWER NECK: Within normal limits.    ABDOMEN AND PELVIS:  LIVER: Within normal limits.  BILE DUCTS: Normal caliber.  GALLBLADDER: Within normal limits.  SPLEEN: Within normal limits.  PANCREAS: Within normal limits.  ADRENALS: Within normal limits.  KIDNEYS/URETERS: Calculus in the right renal pelvis measuring 3 mm,   without evidence ofhydronephrosis.    BLADDER: Within normal limits.  REPRODUCTIVE ORGANS: Myomatous uterus.    BOWEL: No bowel obstruction. Large amount of stool in the right and   transverse colon.  PERITONEUM: No ascites.  VESSELS: Mild atherosclerotic calcifications.  RETROPERITONEUM/LYMPH NODES: No lymphadenopathy.  ABDOMINAL WALL: Large sacral decubitus ulcer with bony erosive changes   and sclerosis of the underlying sacrum, concerning for sequela of   osteomyelitis, similar to findings present on prior exam. No evidence of   drainable fluid collection. Anasarca.  BONES: Degenerative changes.    IMPRESSION:  Chronic changes of osteomyelitis in the sacrum underlying large sacral   decubitus ulcer. No evidence of drainable fluid collection.    Clustered nodular opacities in the left lower lobe, improved compared to   the prior examination, consistent with infectious/inflammatory etiology.    Previously noted right renal calculus is now in the renal pelvis. No   hydronephrosis.      --- End of Report ---            AARON COON MD; Attending Radiologist  This document has been electronically signed. Jan 30 2023 11:03AM    < end of copied text >

## 2023-02-01 NOTE — PROGRESS NOTE ADULT - ASSESSMENT
Assessment/Plan: 51 yo female with a PMHx of traumatic subdural hemorrhage, dementia, HTN,  who was BIBEMS from [facility] for failure to thrive. Facility states that for the past 3 days the pt has been spitting out her medications and food, and has significantly decreased her PO intake. Per the facility, the pt is A&Ox0 and nonverbal at baseline. She does occasionally follow commands. On examination here pt was not following commands.     Wound f/u requested to assist w/ management of chronic stage 4 pressure injury.  - Bone covered, wound base 100% red-granular.  - Undermining remains.  - No necrotic tissue.  - no purulent drainage, no odor.   - No s/s of acute infection; no drainable collections.  - Known chronic OM, completed IV antibiotics as recommended by ID  - Febrile 1/26 Zosyn restarted by ID to complete 7 days course. Today 7/7.  - (+) bedbound  - severe cachexia; protruding bony prominences.  - Incontinent urine and stool.  - poor PO intake, FTT; per staff PO intake improving.  - CT AP 1/30/23  chronic osteomyelitis, no evidence of drainable fluid collection.  - Wound base clean; will switch to Collagenase with Aquacel packing per wound MD.   - Topical Recommendations: Cleanse wound with Dakins 1/4 strength, rinse wound and periwound skin with NS. Dry well. Apply Liquid barrier film to periwound skin. Apply collagenase evangelina thickness to wound base, pack with Aquacel hydrofiber, cover with silicone foam with border. Change daily.  - Continue low airloss support surface, t&P per protocol with use of fluidized postioning devices.  - Perineal care per protocol, once dressing is in place apply Adi moisture barrier cream every shift and prn with episodes of incontinence. Continue use of single breathable incontinence pad.  - Continue use of complete cair boots.  -If patient with loose stools consider External fecal  pouch.    Right ischium hypopigmentation   -Continue to offload as per hospital protocol  -Monitor for tissue type changes   - Apply silicone foam with border.    FTT  - Nutrition recommendations appreciated, severe protein calorie malnutrition.  - Poor PO intake; per staff PO intake improving.   - Family declined PEG.    *Leukocytosis resolved, patient afebrile sacral stage 4 w/o signs of acute infection. Last dose of Zosyn today per ID; patient was restarted 1/23 for 7 day course secondary to fever.  Patient remains with poor PO intake, although improving, no enteral feeds, FTT, dementia, Dolores's disease, bedbound, severe cachexia temporal and muscle wasting, unable to follow commands, stage 4 pressure injury, incontinent urine and stool. All this factors put patient at risks for further skin impairment and or delayed wound healing.     Upon discharge f/u as outpatient at [facility] [address] [place], [place] [phone]  Findings and plan discussed with primary team.   Will follow periodically while inpatient, please reconsult earlier as needed.      Patient seen with Dr. Quintanilla discussed findings and plan with primary team.  Remainder of care per primary team.  MICHEAL Keller-BC, CWOC    pager #[phone]    If after 4PM or before 7:30AM on Mon-Friday or weekend/holiday please contact general surgery for urgent matters.   Team A- [id number]   Team B- [id number]  For non-urgent matters e-mail [email or web address]    We spent 35 minutes face-to-face with this patient of which more than 50% of the time was spent counseling/coordinating care of this patient. Assessment/Plan: 51 yo female with a PMHx of traumatic subdural hemorrhage, dementia, HTN,  who was BIBEMS from [facility] for failure to thrive. Facility states that for the past 3 days the pt has been spitting out her medications and food, and has significantly decreased her PO intake. Per the facility, the pt is A&Ox0 and nonverbal at baseline. She does occasionally follow commands. On examination here pt was not following commands.     Wound f/u requested to assist w/ management of chronic stage 4 pressure injury.  - Bone covered, wound base 100% red-granular.  - Undermining remains.  - No necrotic tissue.  - no purulent drainage, no odor.   - No s/s of acute infection; no drainable collections.  - Known chronic OM, completed IV antibiotics as recommended by ID  - Febrile 1/26 Zosyn restarted by ID to complete 7 days course. Today 7/7.  - (+) bedbound  - severe cachexia; protruding bony prominences.  - Incontinent urine and stool.  - poor PO intake, FTT; per staff PO intake improving.  - CT AP 1/30/23  chronic osteomyelitis, no evidence of drainable fluid collection.  - Wound base clean; will switch to Collagenase with Aquacel packing per wound MD.   - Topical Recommendations: Cleanse wound with Dakins 1/4 strength, rinse wound and periwound skin with NS. Dry well. Apply Liquid barrier film to periwound skin. Apply collagenase evangelina thickness to wound base, pack with Aquacel hydrofiber, cover with silicone foam with border. Change daily.  - Continue low airloss support surface, t&P per protocol with use of fluidized postioning devices.  - Perineal care per protocol, once dressing is in place apply Adi moisture barrier cream every shift and prn with episodes of incontinence. Continue use of single breathable incontinence pad.  - Continue use of complete cair boots.  -If patient with loose stools consider External fecal  pouch.    Right ischium hypopigmentation   -Continue to offload as per hospital protocol  -Monitor for tissue type changes   - Apply silicone foam with border.    FTT  - Nutrition recommendations appreciated, severe protein calorie malnutrition.  - Poor PO intake; per staff PO intake improving.   - Family declined PEG.    *Leukocytosis resolved, patient afebrile sacral stage 4 w/o signs of acute infection. Last dose of Zosyn today per ID; patient was restarted 1/23 for 7 day course secondary to fever.  Patient remains with poor PO intake, although improving, no enteral feeds, FTT, dementia, Dolores's disease, bedbound, severe cachexia temporal and muscle wasting, unable to follow commands, stage 4 pressure injury, incontinent urine and stool. All this factors put patient at risks for further skin impairment and or delayed wound healing.     Upon discharge f/u as outpatient at [facility] [place] [phone]  Findings and plan discussed with primary team.   Will follow periodically while inpatient, please reconsult earlier as needed.      Patient seen with Dr. Quintanilla discussed findings and plan with primary team.  Remainder of care per primary team.  MICHEAL Keller-BC, CWOC    pager #[phone]    If after 4PM or before 7:30AM on Mon-Friday or weekend/holiday please contact general surgery for urgent matters.   Team A- [id number]   Team B- [id number]

## 2023-02-01 NOTE — PROGRESS NOTE ADULT - NUTRITIONAL ASSESSMENT
This patient has been assessed with a concern for Malnutrition and has been determined to have a diagnosis/diagnoses of Severe protein-calorie malnutrition.    This patient is being managed with:   Diet Pureed-  Mildly Thick Liquids (MILDTHICKLIQS)  Entered: Jan 13 2023  9:46AM    
This patient has been assessed with a concern for Malnutrition and has been determined to have a diagnosis/diagnoses of Severe protein-calorie malnutrition.    This patient is being managed with:   Diet Pureed-  Mildly Thick Liquids (MILDTHICKLIQS)  Entered: Jan 13 2023  9:46AM    
This patient has been assessed with a concern for Malnutrition and has been determined to have a diagnosis/diagnoses of Severe protein-calorie malnutrition.    This patient is being managed with:   Diet Pureed-  Moderately Thick Liquids (MODTHICKLIQS)  Entered: Jan 6 2023  9:22AM    
This patient has been assessed with a concern for Malnutrition and has been determined to have a diagnosis/diagnoses of Severe protein-calorie malnutrition.    This patient is being managed with:   Diet Pureed-  Mildly Thick Liquids (MILDTHICKLIQS)  Entered: Jan 13 2023  9:46AM    

## 2023-02-01 NOTE — PROGRESS NOTE ADULT - PROVIDER SPECIALTY LIST ADULT
Hospitalist
Hospitalist
Infectious Disease
Infectious Disease
Wound Care
Hospitalist
Infectious Disease
Infectious Disease
Nephrology
Wound Care
Hospitalist
Infectious Disease
Nephrology
Neurology
Wound Care
Hospitalist
Hospitalist
Infectious Disease
Infectious Disease
Nephrology
Hospitalist

## 2023-02-02 ENCOUNTER — TRANSCRIPTION ENCOUNTER (OUTPATIENT)
Age: 53
End: 2023-02-02

## 2023-02-02 VITALS
SYSTOLIC BLOOD PRESSURE: 114 MMHG | TEMPERATURE: 98 F | DIASTOLIC BLOOD PRESSURE: 70 MMHG | OXYGEN SATURATION: 100 % | HEART RATE: 103 BPM | RESPIRATION RATE: 18 BRPM

## 2023-02-02 RX ORDER — MAGNESIUM HYDROXIDE 400 MG/1
30 TABLET, CHEWABLE ORAL
Qty: 0 | Refills: 0 | DISCHARGE

## 2023-02-02 RX ORDER — LEVETIRACETAM 250 MG/1
1 TABLET, FILM COATED ORAL
Qty: 0 | Refills: 0 | DISCHARGE
Start: 2023-02-02

## 2023-02-02 RX ORDER — COLLAGENASE CLOSTRIDIUM HIST. 250 UNIT/G
1 OINTMENT (GRAM) TOPICAL
Qty: 0 | Refills: 0 | DISCHARGE
Start: 2023-02-02

## 2023-02-02 RX ORDER — QUETIAPINE FUMARATE 200 MG/1
1 TABLET, FILM COATED ORAL
Qty: 0 | Refills: 0 | DISCHARGE
Start: 2023-02-02

## 2023-02-02 RX ORDER — LACTULOSE 10 G/15ML
15 SOLUTION ORAL
Qty: 0 | Refills: 0 | DISCHARGE

## 2023-02-02 RX ORDER — POLYETHYLENE GLYCOL 3350 17 G/17G
17 POWDER, FOR SOLUTION ORAL
Qty: 0 | Refills: 0 | DISCHARGE
Start: 2023-02-02

## 2023-02-02 RX ORDER — CHLORHEXIDINE GLUCONATE 213 G/1000ML
1 SOLUTION TOPICAL
Qty: 0 | Refills: 0 | DISCHARGE
Start: 2023-02-02

## 2023-02-02 RX ORDER — SODIUM HYPOCHLORITE 0.125 %
1 SOLUTION, NON-ORAL MISCELLANEOUS
Qty: 0 | Refills: 0 | DISCHARGE
Start: 2023-02-02

## 2023-02-02 RX ORDER — ZINC SULFATE TAB 220 MG (50 MG ZINC EQUIVALENT) 220 (50 ZN) MG
1 TAB ORAL
Qty: 0 | Refills: 0 | DISCHARGE

## 2023-02-02 RX ORDER — ACETAMINOPHEN 500 MG
2 TABLET ORAL
Qty: 0 | Refills: 0 | DISCHARGE
Start: 2023-02-02

## 2023-02-02 RX ORDER — L.ACIDOPH/B.ANIMALIS/B.LONGUM 15B CELL
1 CAPSULE ORAL
Qty: 0 | Refills: 0 | DISCHARGE

## 2023-02-02 RX ADMIN — AMLODIPINE BESYLATE 5 MILLIGRAM(S): 2.5 TABLET ORAL at 06:56

## 2023-02-02 RX ADMIN — Medication 150 MILLIGRAM(S): at 12:13

## 2023-02-02 RX ADMIN — LEVETIRACETAM 500 MILLIGRAM(S): 250 TABLET, FILM COATED ORAL at 06:56

## 2023-02-02 RX ADMIN — Medication 325 MILLIGRAM(S): at 13:01

## 2023-02-02 RX ADMIN — QUETIAPINE FUMARATE 25 MILLIGRAM(S): 200 TABLET, FILM COATED ORAL at 06:56

## 2023-02-02 RX ADMIN — POLYETHYLENE GLYCOL 3350 17 GRAM(S): 17 POWDER, FOR SOLUTION ORAL at 06:57

## 2023-02-02 RX ADMIN — Medication 1 TABLET(S): at 13:01

## 2023-02-02 RX ADMIN — CHLORHEXIDINE GLUCONATE 1 APPLICATION(S): 213 SOLUTION TOPICAL at 12:13

## 2023-02-02 RX ADMIN — Medication 1 APPLICATION(S): at 06:57

## 2023-02-02 RX ADMIN — Medication 1 APPLICATION(S): at 13:00

## 2023-02-02 RX ADMIN — Medication 500 MILLIGRAM(S): at 13:03

## 2023-02-02 NOTE — DISCHARGE NOTE NURSING/CASE MANAGEMENT/SOCIAL WORK - NSDCFUADDAPPT_GEN_ALL_CORE_FT
Upon discharge f/u as outpatient at [facility] [place] [address] [facility] [phone]

## 2023-02-02 NOTE — DISCHARGE NOTE NURSING/CASE MANAGEMENT/SOCIAL WORK - PATIENT PORTAL LINK FT
You can access the FollowMyHealth Patient Portal offered by [facility] by registering at the following website: [email or web address]. By joining [facility]’s FollowMyHealth portal, you will also be able to view your health information using other applications (apps) compatible with our system.

## 2023-07-14 NOTE — PROGRESS NOTE ADULT - ASSESSMENT
Caller: Ela Paulino    Relationship to patient: Self    Best call back number:[phone]    Patient is needing:   PATIENT IS HAVING SERVE CRAMPS IN HER LEGS AND IS CAUSING TROUBLE WITH HER WALKING MAINLY AT NIGHT FROM THE KNEES DOWN     PATIENT IS ASKING FOR BLOOD WORK ORDERS TO SEE IF ARE LEVELS ARE NORMAL      51 yo female with a PMHx of traumatic subdural hemorrhage, dementia, HTN,  who was BIMBEMS from [facility] for failure to thrive. Facility states that for the past 3 days the pt has been spitting out her medications and food, and has significantly decreased her PO intake. Per the facility, the pt is A&Ox0 and nonverbal at baseline. She does occasionally follow commands. On examination here pt was not following commands    1 FTT  - poor po inatke  - calorie count   - will follow recs      2 Fever  - unclear etiology  - fu  blood cultures  - ID fu  - ABX as per ID     3 Hypernatremia  - monitor BMP  - IVF increased  - renal fu     4 HTN  - cw home meds  - DASH diet

## 2024-10-20 NOTE — DISCHARGE NOTE PROVIDER - CARE PROVIDER_API CALL
04-Nov-2024
Elizabeth Tate  FAMILY MEDICINE  [address], [address]  [place] [address]  Phone: [phone]  Fax: [phone]  Follow Up Time:

## 2024-10-28 NOTE — ED ADULT NURSE NOTE - TEMPERATURE IN FAHRENHEIT (DEGREES F)
Order/Referral Request    Who is requesting: Patient     Orders being requested: MRI Lumbar Spine     Reason service is needed/diagnosis: Sciatica pain    When are orders needed by: ASAP    Has this been discussed with Provider: Yes    Does patient have a preference on a Group/Provider/Facility? [facility] in [place]    Does patient have an appointment scheduled?: No    Where to send orders:  Send to above facility    Okay to leave a detailed message?: Yes at Cell number on file:    Telephone Information:   Mobile [phone]      no 98.6

## 2025-04-13 NOTE — DIETITIAN INITIAL EVALUATION ADULT - PERTINENT MEDS FT
12-Apr-2025 14:20 MEDICATIONS  (STANDING):  amLODIPine   Tablet 5 milliGRAM(s) Oral daily  chlorhexidine 2% Cloths 1 Application(s) Topical daily  Dakins Solution - 1/4 Strength 1 Application(s) Topical two times a day  ferrous    sulfate 325 milliGRAM(s) Oral daily  levETIRAcetam  IVPB 500 milliGRAM(s) IV Intermittent every 12 hours  polyethylene glycol 3350 17 Gram(s) Oral two times a day  QUEtiapine 25 milliGRAM(s) Oral three times a day  traZODone 150 milliGRAM(s) Oral daily    MEDICATIONS  (PRN):  acetaminophen  Suppository .. 650 milliGRAM(s) Rectal every 8 hours PRN Temp greater or equal to 38C (100.4F)  melatonin 3 milliGRAM(s) Oral at bedtime PRN Insomnia

## 2025-04-14 NOTE — CHART NOTE - NSCHARTNOTESELECT_GEN_ALL_CORE
CC:  Yulissa Keene is here today for Pre-Op Exam and Office Visit    Medications: medications verified and updated  Refills needed today?  NO  Preferred pharmacy added   Denies Latex allergy or sensitivity  Tobacco history: verified    Patient would like communication of their results via:    Cell Phone:   Telephone Information:   Mobile [phone]     Okay to leave a message containing results? Yes     Health Maintenance       COVID-19 Vaccine (3 - 2024-25 season)  Overdue since 9/1/2024    Diabetes A1C (Every 6 Months)  Due soon on 6/3/2025           Following review of the above:  Pended orders    Note: Refer to final orders and clinician documentation.      Concerns:Patient is here for a Pre-op exam for surgery on 4/28/25 with Dr. Wynn at [facility]                           Event Note